# Patient Record
Sex: FEMALE | Race: WHITE | NOT HISPANIC OR LATINO | Employment: FULL TIME | URBAN - METROPOLITAN AREA
[De-identification: names, ages, dates, MRNs, and addresses within clinical notes are randomized per-mention and may not be internally consistent; named-entity substitution may affect disease eponyms.]

---

## 2018-06-25 LAB
EXTERNAL CHLAMYDIA SCREEN: NEGATIVE
EXTERNAL GONORRHEA SCREEN: NEGATIVE
EXTERNAL HEPATITIS B SURFACE ANTIGEN: NEGATIVE
EXTERNAL HEPATITIS C AB: NORMAL
EXTERNAL RUBELLA QUALITATIVE: NORMAL
EXTERNAL SYPHILIS RPR SCREEN: NORMAL
HIV1 P24 AG SERPL QL IA: NORMAL

## 2018-09-17 ENCOUNTER — TRANSCRIBE ORDERS (OUTPATIENT)
Dept: WOMENS IMAGING | Facility: HOSPITAL | Age: 23
End: 2018-09-17

## 2018-09-17 DIAGNOSIS — O28.3 FETAL ECHOGENIC INTRACARDIAC FOCUS ON PRENATAL ULTRASOUND: Primary | ICD-10-CM

## 2018-10-15 ENCOUNTER — OFFICE VISIT (OUTPATIENT)
Dept: OBSTETRICS AND GYNECOLOGY | Facility: HOSPITAL | Age: 23
End: 2018-10-15

## 2018-10-15 ENCOUNTER — HOSPITAL ENCOUNTER (OUTPATIENT)
Dept: WOMENS IMAGING | Facility: HOSPITAL | Age: 23
Discharge: HOME | End: 2018-10-15
Admitting: OBSTETRICS & GYNECOLOGY

## 2018-10-15 VITALS
DIASTOLIC BLOOD PRESSURE: 77 MMHG | HEIGHT: 70 IN | BODY MASS INDEX: 41.95 KG/M2 | WEIGHT: 293 LBS | SYSTOLIC BLOOD PRESSURE: 135 MMHG

## 2018-10-15 DIAGNOSIS — O28.3 FETAL ECHOGENIC INTRACARDIAC FOCUS ON PRENATAL ULTRASOUND: ICD-10-CM

## 2018-10-15 DIAGNOSIS — E66.01 MATERNAL MORBID OBESITY, ANTEPARTUM (HCC): ICD-10-CM

## 2018-10-15 DIAGNOSIS — O35.BXX0 ECHOGENIC FOCUS OF HEART OF FETUS AFFECTING ANTEPARTUM CARE OF MOTHER, SINGLE OR UNSPECIFIED FETUS: Primary | ICD-10-CM

## 2018-10-15 DIAGNOSIS — O99.210 MATERNAL MORBID OBESITY, ANTEPARTUM (HCC): ICD-10-CM

## 2018-10-15 PROCEDURE — 76811 OB US DETAILED SNGL FETUS: CPT | Performed by: OBSTETRICS & GYNECOLOGY

## 2018-10-15 PROCEDURE — 76811 OB US DETAILED SNGL FETUS: CPT

## 2018-10-15 RX ORDER — PRENATAL WITH FERROUS FUM AND FOLIC ACID 3080; 920; 120; 400; 22; 1.84; 3; 20; 10; 1; 12; 200; 27; 25; 2 [IU]/1; [IU]/1; MG/1; [IU]/1; MG/1; MG/1; MG/1; MG/1; MG/1; MG/1; UG/1; MG/1; MG/1; MG/1; MG/1
1 TABLET ORAL DAILY
COMMUNITY

## 2018-10-15 NOTE — PROGRESS NOTES
Documentation of the ultasound findings, images, and interpretations with be available in the patient's Viewpoint report located in the Chart Review Imaging tab in [employer].

## 2018-11-13 LAB — EXTERNAL GTT 1 HOUR: 135

## 2018-11-20 LAB
EXTERNAL GLUCOSE TOLERANCE TEST FASTING: 72 MG/DL
EXTERNAL GTT 3 HOURS: 96

## 2018-12-23 ENCOUNTER — HOSPITAL ENCOUNTER (OUTPATIENT)
Facility: HOSPITAL | Age: 23
Setting detail: OBSERVATION
Discharge: HOME | End: 2018-12-23
Attending: OBSTETRICS & GYNECOLOGY | Admitting: OBSTETRICS & GYNECOLOGY

## 2018-12-23 ENCOUNTER — HOSPITAL ENCOUNTER (OUTPATIENT)
Facility: HOSPITAL | Age: 23
End: 2018-12-23
Attending: OBSTETRICS & GYNECOLOGY | Admitting: OBSTETRICS & GYNECOLOGY

## 2018-12-23 VITALS
TEMPERATURE: 98.2 F | RESPIRATION RATE: 16 BRPM | DIASTOLIC BLOOD PRESSURE: 70 MMHG | HEART RATE: 129 BPM | SYSTOLIC BLOOD PRESSURE: 112 MMHG

## 2018-12-23 PROBLEM — Z34.90 PREGNANCY: Status: ACTIVE | Noted: 2018-12-23

## 2018-12-23 LAB
BACTERIA UR QL AUTO: NORMAL /HPF
BILIRUB UR QL STRIP: NEGATIVE
CLARITY UR: CLEAR
COLOR UR: YELLOW
GLUCOSE UR STRIP-MCNC: NEGATIVE MG/DL
HGB UR QL STRIP.AUTO: NEGATIVE
HYALINE CASTS UR QL AUTO: NORMAL /LPF
KETONES UR QL STRIP: ABNORMAL
LEUKOCYTE ESTERASE UR QL STRIP.AUTO: ABNORMAL
NITRITE UR QL STRIP: NEGATIVE
PH UR STRIP.AUTO: 6.5 [PH] (ref 5–8)
PROT UR QL STRIP: NEGATIVE
RBC # UR: NORMAL /HPF
REF LAB TEST METHOD: NORMAL
SP GR UR STRIP: 1.02 (ref 1–1.03)
SQUAMOUS #/AREA URNS HPF: NORMAL /HPF
UROBILINOGEN UR QL STRIP: ABNORMAL
WBC UR QL AUTO: NORMAL /HPF

## 2018-12-23 PROCEDURE — 99218 PR INITIAL OBSERVATION CARE/DAY 30 MINUTES: CPT | Performed by: OBSTETRICS & GYNECOLOGY

## 2018-12-23 PROCEDURE — G0378 HOSPITAL OBSERVATION PER HR: HCPCS

## 2018-12-23 PROCEDURE — 59025 FETAL NON-STRESS TEST: CPT

## 2018-12-23 PROCEDURE — 81001 URINALYSIS AUTO W/SCOPE: CPT | Performed by: OBSTETRICS & GYNECOLOGY

## 2018-12-23 PROCEDURE — 59025 FETAL NON-STRESS TEST: CPT | Performed by: OBSTETRICS & GYNECOLOGY

## 2018-12-23 NOTE — PROGRESS NOTES
\[facility]  Obstetric History and Physical    Referring Provider: Adwoa Lawrence MD      Chief Complaint   Patient presents with   • Abdominal Pain       Subjective     Patient is a 23 y.o. female  currently at 34w1d, who presents with c/o abdominal pain.  She reports sharp shooting midabdominal discomfort the last 30 seconds several times throughout the day without associated leaking of fluid or vaginal bleeding.  Patient reports normal fetal activity.  Patient denies any rare associated symptoms or concerns.  Prenatal care by Dr. Newberry without complications.        The following portions of the patients history were reviewed and updated as appropriate: current medications, allergies, past medical history, past surgical history, past family history, past social history and problem list .       Prenatal Information:   Maternal Prenatal Labs  Blood Type No results found for: ABO   Rh Status No results found for: RH   Antibody Screen No results found for: ABSCRN   Gonnorhea No results found for: GCCX   Chlamydia No results found for: CLAMYDCU   RPR No results found for: RPR   Syphilis Antibody No results found for: SYPHILIS   Rubella No results found for: RUBELLAIGGIN   Hepatitis B Surface Antigen No results found for: HEPBSAG   HIV-1 Antibody No results found for: LABHIV1   Hepatitis C Antibody No results found for: HEPCAB   Rapid Urin Drug Screen No results found for: AMPMETHU, BARBITSCNUR, LABBENZSCN, LABMETHSCN, LABOPIASCN, THCURSCR, COCAINEUR, AMPHETSCREEN, PROPOXSCN, BUPRENORSCNU, METAMPSCNUR, OXYCODONESCN, TRICYCLICSCN   Group B Strep Culture No results found for: GBSANTIGEN                 Past OB History:       Obstetric History       T0      L0     SAB0   TAB0   Ectopic0   Molar0   Multiple0   Live Births0       # Outcome Date GA Lbr Carl/2nd Weight Sex Delivery Anes PTL Lv   1 Current                   Past Medical History: Past Medical History:   Diagnosis Date   • Morbid obesity  with BMI of 40.0-44.9, adult (CMS/[facility])       Past Surgical History Past Surgical History:   Procedure Laterality Date   • EAR TUBES     • MOUTH SURGERY     • TONSILLECTOMY        Family History: Family History   Problem Relation Age of Onset   • No Known Problems Father    • No Known Problems Mother    • No Known Problems Brother    • No Known Problems Sister    • No Known Problems Son    • No Known Problems Daughter    • No Known Problems Paternal Grandfather    • No Known Problems Maternal Grandmother    • No Known Problems Maternal Grandfather    • No Known Problems Maternal Aunt    • No Known Problems Maternal Uncle    • No Known Problems Paternal Aunt    • No Known Problems Paternal Uncle       Social History:  reports that  has never smoked. she has never used smokeless tobacco.   reports that she does not drink alcohol.   reports that she does not use drugs.                   General ROS Negative Findings:Headaches, Visual Changes, Epigastric pain, Anorexia, Nausia/Vomiting, ROM and Vaginal Bleeding    ROS     All other systems have been reviewed and are neg  Objective       Vital Signs Range for the last 24 hours  Temperature: Temp:  [98.2 °F (36.8 °C)] 98.2 °F (36.8 °C)   Temp Source: Temp src: Oral   BP: BP: (112)/(70) 112/70   Pulse: Heart Rate:  [129] 129   Respirations: Resp:  [16] 16   SPO2:     O2 Amount (l/min):     O2 Devices     Weight:       Physical Examination:   General:   alert, appears stated age and cooperative   Skin:   normal   HEENT:  sclera clear    Lungs:   clear to auscultation bilaterally   Heart:   regular rate and rhythm, S1, S2 normal, no murmur, click, rub or gallop   Abdomen:  soft, gravid uterus non tender    Lower Extremeties No edema, no calf tnderness   Pelvis:  External genitalia: normal general appearance  Uterus: enlarged         Presentation: vtx   Cervix: Exam by: Method: sterile exam per physician   Dilation:  closed   Effacement:  th   Station:  ne       Fetal Heart  Rate Assessment   Method: Fetal HR Assessment Method: external   Beats/min: Fetal HR (beats/min): 150   Baseline: Fetal Heart Baseline Rate: normal range   Varibility: Fetal HR Variability: moderate (amplitude range 6 to 25 bpm)   Accels: Fetal HR Accelerations: greater than/equal to 15 bpm   Decels: Fetal HR Decelerations: absent   Tracing Category:     NST  ind   Abdominal pain   reactive, moderate variability, multiple accelerations present 15 x 15, no decelerations, onset 1403 offset 1453.  Irregular contractions.  Uterine Assessment   Method: Method: external tocotransducer   Frequency (min):     Ctx Count in 10 min:     Duration:     Intensity: Contraction Intensity: mild by palpation   Intensity by IUPC:     Resting Tone: Uterine Resting Tone: soft by palpation   Resting Tone by IUPC:     [place] Units:       Laboratory Results:   Lab Results (last 24 hours)     Procedure Component Value Units Date/Time    Urinalysis, Microscopic Only - Urine, Clean Catch [[id number]] Collected:  12/23/18 1425    Specimen:  Urine, Clean Catch Updated:  12/23/18 1445     RBC, UA 0-2 /HPF      WBC, UA 0-2 /HPF      Bacteria, UA None Seen /HPF      Squamous Epithelial Cells, UA 0-2 /HPF      Hyaline Casts, UA 0-6 /LPF      Methodology Automated Microscopy    Urinalysis With Microscopic If Indicated (No Culture) - Urine, Clean Catch [[id number]]  (Abnormal) Collected:  12/23/18 1425    Specimen:  Urine, Clean Catch Updated:  12/23/18 1443     Color, UA Yellow     Appearance, UA Clear     pH, UA 6.5     Specific Gravity, UA 1.020     Glucose, UA Negative     Ketones, UA 15 mg/dL (1+)     Bilirubin, UA Negative     Blood, UA Negative     Protein, UA Negative     Leuk Esterase, UA Trace     Nitrite, UA Negative     Urobilinogen, UA 1.0 E.U./dL        Radiology Review:   Imaging Results (last 24 hours)     ** No results found for the last 24 hours. **        Other Studies:    Assessment/Plan       Pregnancy        Assessment:  1.   Intrauterine pregnancy at 34w1d weeks gestation with reactive fetal status.    2.  False labor   3.   4.      Plan:  1. D/C to home, PTL instructions  2. Plan of care has been reviewed with patient.  3.  Risks, benefits of treatment plan have been discussed.  4.  All questions have been answered.  5      Silvano Rodriguez,   12/23/2018  3:03 PM

## 2018-12-26 ENCOUNTER — OFFICE VISIT (OUTPATIENT)
Dept: OBSTETRICS AND GYNECOLOGY | Facility: HOSPITAL | Age: 23
End: 2018-12-26

## 2018-12-26 ENCOUNTER — HOSPITAL ENCOUNTER (OUTPATIENT)
Dept: WOMENS IMAGING | Facility: HOSPITAL | Age: 23
Discharge: HOME | End: 2018-12-26
Admitting: OBSTETRICS & GYNECOLOGY

## 2018-12-26 VITALS — WEIGHT: 293 LBS | DIASTOLIC BLOOD PRESSURE: 68 MMHG | SYSTOLIC BLOOD PRESSURE: 116 MMHG | BODY MASS INDEX: 44.62 KG/M2

## 2018-12-26 DIAGNOSIS — O99.210 MATERNAL MORBID OBESITY, ANTEPARTUM (HCC): Primary | ICD-10-CM

## 2018-12-26 DIAGNOSIS — O35.BXX0 ECHOGENIC FOCUS OF HEART OF FETUS AFFECTING ANTEPARTUM CARE OF MOTHER, SINGLE OR UNSPECIFIED FETUS: ICD-10-CM

## 2018-12-26 DIAGNOSIS — E66.01 MATERNAL MORBID OBESITY, ANTEPARTUM (HCC): ICD-10-CM

## 2018-12-26 DIAGNOSIS — O99.210 MATERNAL MORBID OBESITY, ANTEPARTUM (HCC): ICD-10-CM

## 2018-12-26 DIAGNOSIS — E66.01 MATERNAL MORBID OBESITY, ANTEPARTUM (HCC): Primary | ICD-10-CM

## 2018-12-26 PROCEDURE — 76816 OB US FOLLOW-UP PER FETUS: CPT

## 2018-12-26 PROCEDURE — 76816 OB US FOLLOW-UP PER FETUS: CPT | Performed by: OBSTETRICS & GYNECOLOGY

## 2018-12-26 NOTE — PROGRESS NOTES
Documentation of the ultasound findings, images, and interpretations with be available in the patient's Viewpoint report located in the Chart Review Imaging tab in [employer].

## 2018-12-26 NOTE — PROGRESS NOTES
Pt denies lof or vag bleeding. States she has been cramping and went to L&D on Sun. No cervical change and sent home. Pt to see Dr Lawrence today.

## 2019-01-07 ENCOUNTER — LAB (OUTPATIENT)
Dept: LAB | Facility: HOSPITAL | Age: 24
End: 2019-01-07

## 2019-01-07 DIAGNOSIS — Z34.03 ENCOUNTER FOR SUPERVISION OF NORMAL FIRST PREGNANCY IN THIRD TRIMESTER: Primary | ICD-10-CM

## 2019-01-07 PROCEDURE — 87081 CULTURE SCREEN ONLY: CPT

## 2019-01-10 LAB — BACTERIA SPEC AEROBE CULT: NORMAL

## 2019-01-27 ENCOUNTER — HOSPITAL ENCOUNTER (INPATIENT)
Dept: LABOR AND DELIVERY | Facility: HOSPITAL | Age: 24
LOS: 3 days | Discharge: HOME | End: 2019-01-30
Attending: OBSTETRICS & GYNECOLOGY | Admitting: OBSTETRICS & GYNECOLOGY

## 2019-01-27 PROBLEM — Z37.9 NORMAL LABOR: Status: ACTIVE | Noted: 2019-01-27

## 2019-01-27 LAB
ABO GROUP BLD: NORMAL
ALP SERPL-CCNC: 195 U/L (ref 25–100)
ALT SERPL W P-5'-P-CCNC: 31 U/L (ref 7–40)
AMPHET+METHAMPHET UR QL: NEGATIVE
AMPHETAMINES UR QL: NEGATIVE
ANTI-D, PASSIVE: NORMAL
AST SERPL-CCNC: 21 U/L (ref 0–33)
BARBITURATES UR QL SCN: NEGATIVE
BENZODIAZ UR QL SCN: NEGATIVE
BILIRUB SERPL-MCNC: 0.3 MG/DL (ref 0.3–1.2)
BLD GP AB SCN SERPL QL: POSITIVE
BUPRENORPHINE SERPL-MCNC: NEGATIVE NG/ML
CANNABINOIDS SERPL QL: NEGATIVE
COCAINE UR QL: NEGATIVE
CREAT BLD-MCNC: 0.6 MG/DL (ref 0.6–1.3)
DEPRECATED RDW RBC AUTO: 47 FL (ref 37–54)
ERYTHROCYTE [DISTWIDTH] IN BLOOD BY AUTOMATED COUNT: 14.7 % (ref 11.3–14.5)
HCT VFR BLD AUTO: 37.8 % (ref 34.5–44)
HGB BLD-MCNC: 12.5 G/DL (ref 11.5–15.5)
LDH SERPL-CCNC: 203 U/L (ref 120–246)
MCH RBC QN AUTO: 29.1 PG (ref 27–31)
MCHC RBC AUTO-ENTMCNC: 33.1 G/DL (ref 32–36)
MCV RBC AUTO: 87.9 FL (ref 80–99)
METHADONE UR QL SCN: NEGATIVE
OPIATES UR QL: NEGATIVE
OXYCODONE UR QL SCN: NEGATIVE
PCP UR QL SCN: NEGATIVE
PLATELET # BLD AUTO: 221 10*3/MM3 (ref 150–450)
PMV BLD AUTO: 10.4 FL (ref 6–12)
PROPOXYPH UR QL: NEGATIVE
RBC # BLD AUTO: 4.3 10*6/MM3 (ref 3.89–5.14)
RH BLD: NEGATIVE
T&S EXPIRATION DATE: NORMAL
TRICYCLICS UR QL SCN: NEGATIVE
URATE SERPL-MCNC: 4.9 MG/DL (ref 3.1–7.8)
WBC NRBC COR # BLD: 8.91 10*3/MM3 (ref 4.5–13.5)

## 2019-01-27 PROCEDURE — 80306 DRUG TEST PRSMV INSTRMNT: CPT | Performed by: OBSTETRICS & GYNECOLOGY

## 2019-01-27 PROCEDURE — 82247 BILIRUBIN TOTAL: CPT | Performed by: OBSTETRICS & GYNECOLOGY

## 2019-01-27 PROCEDURE — 85027 COMPLETE CBC AUTOMATED: CPT | Performed by: OBSTETRICS & GYNECOLOGY

## 2019-01-27 PROCEDURE — 86901 BLOOD TYPING SEROLOGIC RH(D): CPT | Performed by: OBSTETRICS & GYNECOLOGY

## 2019-01-27 PROCEDURE — 84550 ASSAY OF BLOOD/URIC ACID: CPT | Performed by: OBSTETRICS & GYNECOLOGY

## 2019-01-27 PROCEDURE — 83615 LACTATE (LD) (LDH) ENZYME: CPT | Performed by: OBSTETRICS & GYNECOLOGY

## 2019-01-27 PROCEDURE — 86901 BLOOD TYPING SEROLOGIC RH(D): CPT

## 2019-01-27 PROCEDURE — 86900 BLOOD TYPING SEROLOGIC ABO: CPT

## 2019-01-27 PROCEDURE — 86900 BLOOD TYPING SEROLOGIC ABO: CPT | Performed by: OBSTETRICS & GYNECOLOGY

## 2019-01-27 PROCEDURE — 86850 RBC ANTIBODY SCREEN: CPT | Performed by: OBSTETRICS & GYNECOLOGY

## 2019-01-27 PROCEDURE — 3E033VJ INTRODUCTION OF OTHER HORMONE INTO PERIPHERAL VEIN, PERCUTANEOUS APPROACH: ICD-10-PCS | Performed by: OBSTETRICS & GYNECOLOGY

## 2019-01-27 PROCEDURE — 84075 ASSAY ALKALINE PHOSPHATASE: CPT | Performed by: OBSTETRICS & GYNECOLOGY

## 2019-01-27 PROCEDURE — 82565 ASSAY OF CREATININE: CPT | Performed by: OBSTETRICS & GYNECOLOGY

## 2019-01-27 PROCEDURE — 84460 ALANINE AMINO (ALT) (SGPT): CPT | Performed by: OBSTETRICS & GYNECOLOGY

## 2019-01-27 PROCEDURE — 86870 RBC ANTIBODY IDENTIFICATION: CPT | Performed by: OBSTETRICS & GYNECOLOGY

## 2019-01-27 PROCEDURE — 84450 TRANSFERASE (AST) (SGOT): CPT | Performed by: OBSTETRICS & GYNECOLOGY

## 2019-01-27 RX ORDER — SODIUM CHLORIDE 0.9 % (FLUSH) 0.9 %
1-10 SYRINGE (ML) INJECTION AS NEEDED
Status: DISCONTINUED | OUTPATIENT
Start: 2019-01-27 | End: 2019-01-28 | Stop reason: HOSPADM

## 2019-01-27 RX ORDER — ZOLPIDEM TARTRATE 5 MG/1
5 TABLET ORAL NIGHTLY PRN
Status: DISCONTINUED | OUTPATIENT
Start: 2019-01-27 | End: 2019-01-28 | Stop reason: HOSPADM

## 2019-01-27 RX ORDER — PROMETHAZINE HYDROCHLORIDE 12.5 MG/1
12.5 TABLET ORAL EVERY 6 HOURS PRN
Status: DISCONTINUED | OUTPATIENT
Start: 2019-01-27 | End: 2019-01-28 | Stop reason: HOSPADM

## 2019-01-27 RX ORDER — PROMETHAZINE HYDROCHLORIDE 12.5 MG/1
12.5 SUPPOSITORY RECTAL EVERY 6 HOURS PRN
Status: DISCONTINUED | OUTPATIENT
Start: 2019-01-27 | End: 2019-01-28 | Stop reason: HOSPADM

## 2019-01-27 RX ORDER — LIDOCAINE HYDROCHLORIDE 10 MG/ML
5 INJECTION, SOLUTION EPIDURAL; INFILTRATION; INTRACAUDAL; PERINEURAL AS NEEDED
Status: DISCONTINUED | OUTPATIENT
Start: 2019-01-27 | End: 2019-01-28 | Stop reason: HOSPADM

## 2019-01-27 RX ORDER — PROMETHAZINE HYDROCHLORIDE 25 MG/ML
12.5 INJECTION, SOLUTION INTRAMUSCULAR; INTRAVENOUS EVERY 6 HOURS PRN
Status: DISCONTINUED | OUTPATIENT
Start: 2019-01-27 | End: 2019-01-28 | Stop reason: HOSPADM

## 2019-01-27 RX ORDER — ONDANSETRON 4 MG/1
4 TABLET, FILM COATED ORAL EVERY 6 HOURS PRN
Status: DISCONTINUED | OUTPATIENT
Start: 2019-01-27 | End: 2019-01-28 | Stop reason: HOSPADM

## 2019-01-27 RX ORDER — ONDANSETRON 2 MG/ML
4 INJECTION INTRAMUSCULAR; INTRAVENOUS EVERY 6 HOURS PRN
Status: DISCONTINUED | OUTPATIENT
Start: 2019-01-27 | End: 2019-01-28 | Stop reason: HOSPADM

## 2019-01-27 RX ORDER — BUTORPHANOL TARTRATE 1 MG/ML
2 INJECTION, SOLUTION INTRAMUSCULAR; INTRAVENOUS
Status: DISCONTINUED | OUTPATIENT
Start: 2019-01-27 | End: 2019-01-28 | Stop reason: HOSPADM

## 2019-01-27 RX ORDER — BUTORPHANOL TARTRATE 1 MG/ML
1 INJECTION, SOLUTION INTRAMUSCULAR; INTRAVENOUS
Status: DISCONTINUED | OUTPATIENT
Start: 2019-01-27 | End: 2019-01-28 | Stop reason: HOSPADM

## 2019-01-27 RX ORDER — SODIUM CHLORIDE 0.9 % (FLUSH) 0.9 %
3 SYRINGE (ML) INJECTION EVERY 12 HOURS SCHEDULED
Status: DISCONTINUED | OUTPATIENT
Start: 2019-01-27 | End: 2019-01-28 | Stop reason: HOSPADM

## 2019-01-27 RX ORDER — SODIUM CHLORIDE, SODIUM LACTATE, POTASSIUM CHLORIDE, CALCIUM CHLORIDE 600; 310; 30; 20 MG/100ML; MG/100ML; MG/100ML; MG/100ML
125 INJECTION, SOLUTION INTRAVENOUS CONTINUOUS
Status: DISCONTINUED | OUTPATIENT
Start: 2019-01-27 | End: 2019-01-30

## 2019-01-27 RX ORDER — OXYTOCIN-SODIUM CHLORIDE 0.9% IV SOLN 30 UNIT/500ML 30-0.9/5 UT/ML-%
2-30 SOLUTION INTRAVENOUS
Status: DISCONTINUED | OUTPATIENT
Start: 2019-01-28 | End: 2019-01-30

## 2019-01-27 RX ORDER — MAGNESIUM CARB/ALUMINUM HYDROX 105-160MG
30 TABLET,CHEWABLE ORAL ONCE
Status: DISCONTINUED | OUTPATIENT
Start: 2019-01-27 | End: 2019-01-28 | Stop reason: HOSPADM

## 2019-01-27 RX ADMIN — DINOPROSTONE 10 MG: 10 INSERT VAGINAL at 18:45

## 2019-01-27 RX ADMIN — SODIUM CHLORIDE, POTASSIUM CHLORIDE, SODIUM LACTATE AND CALCIUM CHLORIDE 125 ML/HR: 600; 310; 30; 20 INJECTION, SOLUTION INTRAVENOUS at 18:20

## 2019-01-28 ENCOUNTER — ANESTHESIA (OUTPATIENT)
Dept: LABOR AND DELIVERY | Facility: HOSPITAL | Age: 24
End: 2019-01-28

## 2019-01-28 ENCOUNTER — ANESTHESIA EVENT (OUTPATIENT)
Dept: LABOR AND DELIVERY | Facility: HOSPITAL | Age: 24
End: 2019-01-28

## 2019-01-28 PROBLEM — Z34.90 PREGNANCY: Status: RESOLVED | Noted: 2018-12-23 | Resolved: 2019-01-28

## 2019-01-28 LAB
ABO GROUP BLD: NORMAL
ATMOSPHERIC PRESS: ABNORMAL MMHG
ATMOSPHERIC PRESS: ABNORMAL MMHG
BASE EXCESS BLDCOA CALC-SCNC: -5.5 MMOL/L (ref 0–2)
BASE EXCESS BLDCOV CALC-SCNC: -5.2 MMOL/L (ref 0–2)
BDY SITE: ABNORMAL
BDY SITE: ABNORMAL
BODY TEMPERATURE: 37 C
BODY TEMPERATURE: 37 C
CO2 BLDA-SCNC: 20.1 MMOL/L (ref 23–27)
CO2 BLDA-SCNC: 25.1 MMOL/L (ref 23–27)
FETAL BLEED: NEGATIVE
HCO3 BLDCOA-SCNC: 23.4 MMOL/L (ref 16.9–20.5)
HCO3 BLDCOV-SCNC: 19 MMOL/L (ref 18.6–21.4)
HGB BLDA-MCNC: 16.4 G/DL (ref 14–18)
HGB BLDA-MCNC: 17 G/DL (ref 14–18)
HOROWITZ INDEX BLD+IHG-RTO: 21 %
HOROWITZ INDEX BLD+IHG-RTO: 21 %
MODALITY: ABNORMAL
MODALITY: ABNORMAL
NOTE: ABNORMAL
NOTE: ABNORMAL
NUMBER OF DOSES: NORMAL
PCO2 BLDCOA: 57.4 MMHG (ref 43.3–54.9)
PCO2 BLDCOV: 33.1 MM HG
PH BLDCOA: 7.22 PH UNITS (ref 7.22–7.3)
PH BLDCOV: 7.37 PH UNITS
PO2 BLDCOA: 17.8 MMHG (ref 11.5–43.3)
PO2 BLDCOV: 35.3 MM HG
RH BLD: NEGATIVE
SAO2 % BLDCOA: 24.3 %
SAO2 % BLDCOA: ABNORMAL % (ref 92–98)
SAO2 % BLDCOV: 74.5 %

## 2019-01-28 PROCEDURE — 86900 BLOOD TYPING SEROLOGIC ABO: CPT | Performed by: OBSTETRICS & GYNECOLOGY

## 2019-01-28 PROCEDURE — 3E0P7VZ INTRODUCTION OF HORMONE INTO FEMALE REPRODUCTIVE, VIA NATURAL OR ARTIFICIAL OPENING: ICD-10-PCS | Performed by: OBSTETRICS & GYNECOLOGY

## 2019-01-28 PROCEDURE — C1755 CATHETER, INTRASPINAL: HCPCS | Performed by: ANESTHESIOLOGY

## 2019-01-28 PROCEDURE — 85461 HEMOGLOBIN FETAL: CPT | Performed by: OBSTETRICS & GYNECOLOGY

## 2019-01-28 PROCEDURE — 25010000002 BUTORPHANOL PER 1 MG: Performed by: OBSTETRICS & GYNECOLOGY

## 2019-01-28 PROCEDURE — 25010000002 FENTANYL CITRATE (PF) 100 MCG/2ML SOLUTION: Performed by: ANESTHESIOLOGY

## 2019-01-28 PROCEDURE — 0KQM0ZZ REPAIR PERINEUM MUSCLE, OPEN APPROACH: ICD-10-PCS | Performed by: OBSTETRICS & GYNECOLOGY

## 2019-01-28 PROCEDURE — 25010000002 CEFTRIAXONE PER 250 MG: Performed by: OBSTETRICS & GYNECOLOGY

## 2019-01-28 PROCEDURE — 51702 INSERT TEMP BLADDER CATH: CPT

## 2019-01-28 PROCEDURE — 25010000002 ROPIVACAINE PER 1 MG: Performed by: ANESTHESIOLOGY

## 2019-01-28 PROCEDURE — 25010000002 RHO D IMMUNE GLOBULIN 1500 UNIT/2ML SOLUTION PREFILLED SYRINGE: Performed by: OBSTETRICS & GYNECOLOGY

## 2019-01-28 PROCEDURE — 82805 BLOOD GASES W/O2 SATURATION: CPT

## 2019-01-28 PROCEDURE — C1755 CATHETER, INTRASPINAL: HCPCS

## 2019-01-28 PROCEDURE — 86901 BLOOD TYPING SEROLOGIC RH(D): CPT | Performed by: OBSTETRICS & GYNECOLOGY

## 2019-01-28 PROCEDURE — 59025 FETAL NON-STRESS TEST: CPT

## 2019-01-28 RX ORDER — OXYCODONE HYDROCHLORIDE AND ACETAMINOPHEN 5; 325 MG/1; MG/1
1 TABLET ORAL EVERY 4 HOURS PRN
Status: DISCONTINUED | OUTPATIENT
Start: 2019-01-28 | End: 2019-01-30 | Stop reason: HOSPADM

## 2019-01-28 RX ORDER — HYDROCODONE BITARTRATE AND ACETAMINOPHEN 5; 325 MG/1; MG/1
1 TABLET ORAL EVERY 4 HOURS PRN
Status: DISCONTINUED | OUTPATIENT
Start: 2019-01-28 | End: 2019-01-28 | Stop reason: HOSPADM

## 2019-01-28 RX ORDER — OXYTOCIN-SODIUM CHLORIDE 0.9% IV SOLN 30 UNIT/500ML 30-0.9/5 UT/ML-%
85 SOLUTION INTRAVENOUS ONCE
Status: COMPLETED | OUTPATIENT
Start: 2019-01-28 | End: 2019-01-28

## 2019-01-28 RX ORDER — HYDROCODONE BITARTRATE AND ACETAMINOPHEN 5; 325 MG/1; MG/1
1 TABLET ORAL EVERY 4 HOURS PRN
Status: DISCONTINUED | OUTPATIENT
Start: 2019-01-28 | End: 2019-01-30 | Stop reason: HOSPADM

## 2019-01-28 RX ORDER — FENTANYL CITRATE 50 UG/ML
INJECTION, SOLUTION INTRAMUSCULAR; INTRAVENOUS AS NEEDED
Status: DISCONTINUED | OUTPATIENT
Start: 2019-01-28 | End: 2019-01-28 | Stop reason: SURG

## 2019-01-28 RX ORDER — CEFTRIAXONE SODIUM 1 G/50ML
1 INJECTION, SOLUTION INTRAVENOUS ONCE
Status: COMPLETED | OUTPATIENT
Start: 2019-01-28 | End: 2019-01-28

## 2019-01-28 RX ORDER — OXYTOCIN-SODIUM CHLORIDE 0.9% IV SOLN 30 UNIT/500ML 30-0.9/5 UT/ML-%
650 SOLUTION INTRAVENOUS ONCE
Status: COMPLETED | OUTPATIENT
Start: 2019-01-28 | End: 2019-01-28

## 2019-01-28 RX ORDER — METHYLERGONOVINE MALEATE 0.2 MG/ML
200 INJECTION INTRAVENOUS ONCE AS NEEDED
Status: DISCONTINUED | OUTPATIENT
Start: 2019-01-28 | End: 2019-01-28 | Stop reason: HOSPADM

## 2019-01-28 RX ORDER — EPHEDRINE SULFATE/0.9% NACL/PF 25 MG/5 ML
10 SYRINGE (ML) INTRAVENOUS
Status: DISCONTINUED | OUTPATIENT
Start: 2019-01-28 | End: 2019-01-28 | Stop reason: HOSPADM

## 2019-01-28 RX ORDER — SODIUM CHLORIDE 0.9 % (FLUSH) 0.9 %
1-10 SYRINGE (ML) INJECTION AS NEEDED
Status: DISCONTINUED | OUTPATIENT
Start: 2019-01-28 | End: 2019-01-30 | Stop reason: HOSPADM

## 2019-01-28 RX ORDER — LIDOCAINE HYDROCHLORIDE AND EPINEPHRINE 15; 5 MG/ML; UG/ML
INJECTION, SOLUTION EPIDURAL AS NEEDED
Status: DISCONTINUED | OUTPATIENT
Start: 2019-01-28 | End: 2019-01-28 | Stop reason: SURG

## 2019-01-28 RX ORDER — ROPIVACAINE HYDROCHLORIDE 2 MG/ML
16 INJECTION, SOLUTION EPIDURAL; INFILTRATION; PERINEURAL CONTINUOUS
Status: DISCONTINUED | OUTPATIENT
Start: 2019-01-28 | End: 2019-01-30

## 2019-01-28 RX ORDER — BISACODYL 10 MG
10 SUPPOSITORY, RECTAL RECTAL DAILY PRN
Status: DISCONTINUED | OUTPATIENT
Start: 2019-01-29 | End: 2019-01-30 | Stop reason: HOSPADM

## 2019-01-28 RX ORDER — ZOLPIDEM TARTRATE 5 MG/1
5 TABLET ORAL NIGHTLY PRN
Status: DISCONTINUED | OUTPATIENT
Start: 2019-01-28 | End: 2019-01-30 | Stop reason: HOSPADM

## 2019-01-28 RX ORDER — TRISODIUM CITRATE DIHYDRATE AND CITRIC ACID MONOHYDRATE 500; 334 MG/5ML; MG/5ML
30 SOLUTION ORAL ONCE
Status: DISCONTINUED | OUTPATIENT
Start: 2019-01-28 | End: 2019-01-28 | Stop reason: HOSPADM

## 2019-01-28 RX ORDER — PRENATAL VIT/IRON FUM/FOLIC AC 27MG-0.8MG
1 TABLET ORAL DAILY
Status: DISCONTINUED | OUTPATIENT
Start: 2019-01-28 | End: 2019-01-30 | Stop reason: HOSPADM

## 2019-01-28 RX ORDER — LIDOCAINE HYDROCHLORIDE 20 MG/ML
INJECTION, SOLUTION INFILTRATION; PERINEURAL AS NEEDED
Status: DISCONTINUED | OUTPATIENT
Start: 2019-01-28 | End: 2019-01-28 | Stop reason: SURG

## 2019-01-28 RX ORDER — ONDANSETRON 2 MG/ML
4 INJECTION INTRAMUSCULAR; INTRAVENOUS ONCE AS NEEDED
Status: DISCONTINUED | OUTPATIENT
Start: 2019-01-28 | End: 2019-01-28 | Stop reason: HOSPADM

## 2019-01-28 RX ORDER — ONDANSETRON 2 MG/ML
4 INJECTION INTRAMUSCULAR; INTRAVENOUS EVERY 6 HOURS PRN
Status: DISCONTINUED | OUTPATIENT
Start: 2019-01-28 | End: 2019-01-30 | Stop reason: HOSPADM

## 2019-01-28 RX ORDER — METHYLERGONOVINE MALEATE 0.2 MG/ML
200 INJECTION INTRAVENOUS ONCE AS NEEDED
Status: DISCONTINUED | OUTPATIENT
Start: 2019-01-28 | End: 2019-01-30 | Stop reason: HOSPADM

## 2019-01-28 RX ORDER — IBUPROFEN 600 MG/1
600 TABLET ORAL EVERY 6 HOURS PRN
Status: DISCONTINUED | OUTPATIENT
Start: 2019-01-28 | End: 2019-01-30 | Stop reason: HOSPADM

## 2019-01-28 RX ORDER — ONDANSETRON 4 MG/1
4 TABLET, FILM COATED ORAL EVERY 6 HOURS PRN
Status: DISCONTINUED | OUTPATIENT
Start: 2019-01-28 | End: 2019-01-30 | Stop reason: HOSPADM

## 2019-01-28 RX ORDER — DIPHENHYDRAMINE HYDROCHLORIDE 50 MG/ML
12.5 INJECTION INTRAMUSCULAR; INTRAVENOUS EVERY 8 HOURS PRN
Status: DISCONTINUED | OUTPATIENT
Start: 2019-01-28 | End: 2019-01-28 | Stop reason: HOSPADM

## 2019-01-28 RX ORDER — LIDOCAINE HYDROCHLORIDE AND EPINEPHRINE 20; 5 MG/ML; UG/ML
INJECTION, SOLUTION EPIDURAL; INFILTRATION; INTRACAUDAL; PERINEURAL AS NEEDED
Status: DISCONTINUED | OUTPATIENT
Start: 2019-01-28 | End: 2019-01-28 | Stop reason: SURG

## 2019-01-28 RX ORDER — DOCUSATE SODIUM 100 MG/1
100 CAPSULE, LIQUID FILLED ORAL 2 TIMES DAILY
Status: DISCONTINUED | OUTPATIENT
Start: 2019-01-28 | End: 2019-01-30 | Stop reason: HOSPADM

## 2019-01-28 RX ORDER — METOCLOPRAMIDE HYDROCHLORIDE 5 MG/ML
10 INJECTION INTRAMUSCULAR; INTRAVENOUS ONCE AS NEEDED
Status: DISCONTINUED | OUTPATIENT
Start: 2019-01-28 | End: 2019-01-28 | Stop reason: HOSPADM

## 2019-01-28 RX ORDER — MISOPROSTOL 200 UG/1
800 TABLET ORAL AS NEEDED
Status: DISCONTINUED | OUTPATIENT
Start: 2019-01-28 | End: 2019-01-28 | Stop reason: HOSPADM

## 2019-01-28 RX ORDER — MAGNESIUM CARB/ALUMINUM HYDROX 105-160MG
30 TABLET,CHEWABLE ORAL ONCE
Status: DISCONTINUED | OUTPATIENT
Start: 2019-01-28 | End: 2019-01-28 | Stop reason: HOSPADM

## 2019-01-28 RX ORDER — LANOLIN 100 %
OINTMENT (GRAM) TOPICAL
Status: DISCONTINUED | OUTPATIENT
Start: 2019-01-28 | End: 2019-01-30 | Stop reason: HOSPADM

## 2019-01-28 RX ORDER — PROMETHAZINE HYDROCHLORIDE 12.5 MG/1
12.5 TABLET ORAL EVERY 4 HOURS PRN
Status: DISCONTINUED | OUTPATIENT
Start: 2019-01-28 | End: 2019-01-30 | Stop reason: HOSPADM

## 2019-01-28 RX ORDER — CARBOPROST TROMETHAMINE 250 UG/ML
250 INJECTION, SOLUTION INTRAMUSCULAR AS NEEDED
Status: DISCONTINUED | OUTPATIENT
Start: 2019-01-28 | End: 2019-01-28 | Stop reason: HOSPADM

## 2019-01-28 RX ORDER — IBUPROFEN 600 MG/1
600 TABLET ORAL EVERY 6 HOURS PRN
Status: DISCONTINUED | OUTPATIENT
Start: 2019-01-28 | End: 2019-01-28 | Stop reason: HOSPADM

## 2019-01-28 RX ORDER — PROMETHAZINE HYDROCHLORIDE 25 MG/ML
12.5 INJECTION, SOLUTION INTRAMUSCULAR; INTRAVENOUS EVERY 4 HOURS PRN
Status: DISCONTINUED | OUTPATIENT
Start: 2019-01-28 | End: 2019-01-30 | Stop reason: HOSPADM

## 2019-01-28 RX ADMIN — BENZOCAINE 1 APPLICATION: 5.6 OINTMENT TOPICAL at 18:44

## 2019-01-28 RX ADMIN — IBUPROFEN 600 MG: 600 TABLET ORAL at 16:15

## 2019-01-28 RX ADMIN — LIDOCAINE HYDROCHLORIDE,EPINEPHRINE BITARTRATE 5 ML: 20; .005 INJECTION, SOLUTION EPIDURAL; INFILTRATION; INTRACAUDAL; PERINEURAL at 13:07

## 2019-01-28 RX ADMIN — ROPIVACAINE HYDROCHLORIDE 10 ML: 5 INJECTION, SOLUTION EPIDURAL; INFILTRATION; PERINEURAL at 11:08

## 2019-01-28 RX ADMIN — OXYTOCIN 2 MILLI-UNITS/MIN: 10 INJECTION, SOLUTION INTRAMUSCULAR; INTRAVENOUS at 01:40

## 2019-01-28 RX ADMIN — Medication 10 MG: at 06:01

## 2019-01-28 RX ADMIN — LIDOCAINE HYDROCHLORIDE AND EPINEPHRINE 3 ML: 15; 5 INJECTION, SOLUTION EPIDURAL at 05:43

## 2019-01-28 RX ADMIN — HYDROCODONE BITARTRATE AND ACETAMINOPHEN 1 TABLET: 5; 325 TABLET ORAL at 17:03

## 2019-01-28 RX ADMIN — CEFTRIAXONE SODIUM 1 G: 1 INJECTION, SOLUTION INTRAVENOUS at 20:30

## 2019-01-28 RX ADMIN — ROPIVACAINE HYDROCHLORIDE 10 ML: 5 INJECTION, SOLUTION EPIDURAL; INFILTRATION; PERINEURAL at 05:48

## 2019-01-28 RX ADMIN — FENTANYL CITRATE 100 MCG: 50 INJECTION, SOLUTION INTRAMUSCULAR; INTRAVENOUS at 05:48

## 2019-01-28 RX ADMIN — WITCH HAZEL 1 PAD: 500 SOLUTION RECTAL; TOPICAL at 18:44

## 2019-01-28 RX ADMIN — OXYCODONE HYDROCHLORIDE AND ACETAMINOPHEN 1 TABLET: 5; 325 TABLET ORAL at 21:50

## 2019-01-28 RX ADMIN — Medication: at 18:44

## 2019-01-28 RX ADMIN — OXYTOCIN 650 ML/HR: 10 INJECTION, SOLUTION INTRAMUSCULAR; INTRAVENOUS at 15:41

## 2019-01-28 RX ADMIN — OXYTOCIN 85 ML/HR: 10 INJECTION, SOLUTION INTRAMUSCULAR; INTRAVENOUS at 16:00

## 2019-01-28 RX ADMIN — BUTORPHANOL TARTRATE 2 MG: 1 INJECTION, SOLUTION INTRAMUSCULAR; INTRAVENOUS at 02:55

## 2019-01-28 RX ADMIN — LIDOCAINE HYDROCHLORIDE 5 ML: 20 INJECTION, SOLUTION INFILTRATION; PERINEURAL at 05:40

## 2019-01-28 RX ADMIN — SODIUM CHLORIDE, POTASSIUM CHLORIDE, SODIUM LACTATE AND CALCIUM CHLORIDE 125 ML/HR: 600; 310; 30; 20 INJECTION, SOLUTION INTRAVENOUS at 00:30

## 2019-01-28 RX ADMIN — HUMAN RHO(D) IMMUNE GLOBULIN 1500 UNITS: 1500 SOLUTION INTRAMUSCULAR; INTRAVENOUS at 23:37

## 2019-01-28 RX ADMIN — SODIUM CHLORIDE, POTASSIUM CHLORIDE, SODIUM LACTATE AND CALCIUM CHLORIDE 125 ML/HR: 600; 310; 30; 20 INJECTION, SOLUTION INTRAVENOUS at 13:24

## 2019-01-28 RX ADMIN — SODIUM CHLORIDE, POTASSIUM CHLORIDE, SODIUM LACTATE AND CALCIUM CHLORIDE 1000 ML: 600; 310; 30; 20 INJECTION, SOLUTION INTRAVENOUS at 05:12

## 2019-01-28 RX ADMIN — ROPIVACAINE HYDROCHLORIDE 16 ML/HR: 2 INJECTION, SOLUTION EPIDURAL; INFILTRATION at 05:53

## 2019-01-28 NOTE — ANESTHESIA PROCEDURE NOTES
Labor Epidural      Patient reassessed immediately prior to procedure    Patient location during procedure: OB  Performed By  Anesthesiologist: Maciej Guzmán DO  Preanesthetic Checklist  Completed: patient identified, site marked, surgical consent, pre-op evaluation, timeout performed, IV checked, risks and benefits discussed and monitors and equipment checked  Prep:  Pt Position:sitting  Sterile Tech:gloves, mask, sterile barrier and cap  Prep:chlorhexidine gluconate and isopropyl alcohol  Monitoring:blood pressure monitoring and continuous pulse oximetry  Epidural Block Procedure:  Approach:midline  Guidance:landmark technique and palpation technique  Location:L3-L4  Needle Type:Tuohy  Needle Gauge:17 G  Loss of Resistance Medium: air  Loss of Resistance: 8cm  Cath Depth at skin:13 cm  Paresthesia: none  Aspiration:negative  Test Dose:negative  Number of Attempts: 1  Post Assessment:  Dressing:secured with tape and occlusive dressing applied (Tegaderm Placed)  Pt Tolerance:patient tolerated the procedure well with no apparent complications  Complications:no

## 2019-01-28 NOTE — ANESTHESIA PREPROCEDURE EVALUATION
Anesthesia Evaluation     Patient summary reviewed and Nursing notes reviewed   NPO Solid Status: > 6 hours  NPO Liquid Status: < 2 hours           Airway   Mallampati: III  TM distance: >3 FB  Neck ROM: full  No difficulty expected  Dental      Pulmonary - negative pulmonary ROS   Cardiovascular - negative cardio ROS        Neuro/Psych- negative ROS  GI/Hepatic/Renal/Endo    (+) morbid obesity,      Musculoskeletal (-) negative ROS    Abdominal    Substance History - negative use     OB/GYN    (+) Pregnant,         Other                        Anesthesia Plan    ASA 3     epidural     Anesthetic plan, all risks, benefits, and alternatives have been provided, discussed and informed consent has been obtained with: patient.  Use of blood products discussed with patient .

## 2019-01-29 LAB
BASOPHILS # BLD AUTO: 0.01 10*3/MM3 (ref 0–0.2)
BASOPHILS NFR BLD AUTO: 0.1 % (ref 0–1)
DEPRECATED RDW RBC AUTO: 46.7 FL (ref 37–54)
EOSINOPHIL # BLD AUTO: 0.02 10*3/MM3 (ref 0–0.3)
EOSINOPHIL NFR BLD AUTO: 0.2 % (ref 0–3)
ERYTHROCYTE [DISTWIDTH] IN BLOOD BY AUTOMATED COUNT: 14.6 % (ref 11.3–14.5)
HCT VFR BLD AUTO: 30.2 % (ref 34.5–44)
HGB BLD-MCNC: 10.1 G/DL (ref 11.5–15.5)
IMM GRANULOCYTES # BLD AUTO: 0.03 10*3/MM3 (ref 0–0.03)
IMM GRANULOCYTES NFR BLD AUTO: 0.3 % (ref 0–0.6)
LYMPHOCYTES # BLD AUTO: 0.97 10*3/MM3 (ref 0.6–4.8)
LYMPHOCYTES NFR BLD AUTO: 10.5 % (ref 24–44)
MCH RBC QN AUTO: 29.2 PG (ref 27–31)
MCHC RBC AUTO-ENTMCNC: 33.4 G/DL (ref 32–36)
MCV RBC AUTO: 87.3 FL (ref 80–99)
MONOCYTES # BLD AUTO: 0.84 10*3/MM3 (ref 0–1)
MONOCYTES NFR BLD AUTO: 9.1 % (ref 0–12)
NEUTROPHILS # BLD AUTO: 7.39 10*3/MM3 (ref 1.5–8.3)
NEUTROPHILS NFR BLD AUTO: 79.8 % (ref 41–71)
PLATELET # BLD AUTO: 139 10*3/MM3 (ref 150–450)
PMV BLD AUTO: 10.2 FL (ref 6–12)
RBC # BLD AUTO: 3.46 10*6/MM3 (ref 3.89–5.14)
WBC NRBC COR # BLD: 9.26 10*3/MM3 (ref 4.5–13.5)

## 2019-01-29 PROCEDURE — 85025 COMPLETE CBC W/AUTO DIFF WBC: CPT | Performed by: OBSTETRICS & GYNECOLOGY

## 2019-01-29 RX ADMIN — DOCUSATE SODIUM 100 MG: 100 CAPSULE, LIQUID FILLED ORAL at 21:02

## 2019-01-29 RX ADMIN — IBUPROFEN 600 MG: 600 TABLET ORAL at 19:48

## 2019-01-29 RX ADMIN — DOCUSATE SODIUM 100 MG: 100 CAPSULE, LIQUID FILLED ORAL at 13:09

## 2019-01-29 RX ADMIN — OXYCODONE HYDROCHLORIDE AND ACETAMINOPHEN 1 TABLET: 5; 325 TABLET ORAL at 03:47

## 2019-01-29 RX ADMIN — IBUPROFEN 600 MG: 600 TABLET ORAL at 13:09

## 2019-01-29 RX ADMIN — IBUPROFEN 600 MG: 600 TABLET ORAL at 03:47

## 2019-01-29 NOTE — ANESTHESIA POSTPROCEDURE EVALUATION
Patient: Sabra Carias    Procedure Summary     Date:  01/28/19 Room / Location:      Anesthesia Start:  0527 Anesthesia Stop:  1545    Procedure:  LABOR ANALGESIA Diagnosis:      Scheduled Providers:   Provider:  Wen Seth DO    Anesthesia Type:  epidural ASA Status:  3          Anesthesia Type: epidural  Last vitals  BP   94/51 (01/29/19 0700)   Temp   98.5 °F (36.9 °C) (01/29/19 0700)   Pulse   88 (01/29/19 0700)   Resp   20 (01/29/19 0700)     SpO2   97 % (01/28/19 0553)     Post Anesthesia Care and Evaluation    Patient location during evaluation: bedside  Patient participation: complete - patient participated  Level of consciousness: awake and alert  Pain management: adequate  Airway patency: patent  Anesthetic complications: No anesthetic complications    Cardiovascular status: acceptable  Respiratory status: acceptable  Hydration status: acceptable  Post Neuraxial Block status: Motor and sensory function returned to baseline and No signs or symptoms of PDPH

## 2019-01-30 VITALS
RESPIRATION RATE: 18 BRPM | SYSTOLIC BLOOD PRESSURE: 116 MMHG | BODY MASS INDEX: 41.95 KG/M2 | DIASTOLIC BLOOD PRESSURE: 58 MMHG | HEIGHT: 70 IN | WEIGHT: 293 LBS | TEMPERATURE: 98.3 F | OXYGEN SATURATION: 97 % | HEART RATE: 78 BPM

## 2019-01-30 RX ORDER — IBUPROFEN 600 MG/1
600 TABLET ORAL EVERY 6 HOURS PRN
Qty: 30 TABLET | Refills: 0 | Status: SHIPPED | OUTPATIENT
Start: 2019-01-30 | End: 2021-06-29

## 2019-01-30 RX ADMIN — PRENATAL VIT W/ FE FUMARATE-FA TAB 27-0.8 MG 1 TABLET: 27-0.8 TAB at 08:50

## 2019-01-30 RX ADMIN — IBUPROFEN 600 MG: 600 TABLET ORAL at 08:48

## 2019-01-30 RX ADMIN — IBUPROFEN 600 MG: 600 TABLET ORAL at 02:01

## 2019-01-30 RX ADMIN — DOCUSATE SODIUM 100 MG: 100 CAPSULE, LIQUID FILLED ORAL at 08:48

## 2019-01-30 RX ADMIN — OXYCODONE HYDROCHLORIDE AND ACETAMINOPHEN 1 TABLET: 5; 325 TABLET ORAL at 08:48

## 2020-03-25 ENCOUNTER — TRANSCRIBE ORDERS (OUTPATIENT)
Dept: NUTRITION | Facility: HOSPITAL | Age: 25
End: 2020-03-25

## 2020-03-25 DIAGNOSIS — E66.9 OBESITY, UNSPECIFIED CLASSIFICATION, UNSPECIFIED OBESITY TYPE, UNSPECIFIED WHETHER SERIOUS COMORBIDITY PRESENT: Primary | ICD-10-CM

## 2020-04-06 ENCOUNTER — HOSPITAL ENCOUNTER (OUTPATIENT)
Dept: NUTRITION | Facility: HOSPITAL | Age: 25
Setting detail: RECURRING SERIES
Discharge: HOME | End: 2020-04-06

## 2020-04-06 VITALS — BODY MASS INDEX: 41.95 KG/M2 | HEIGHT: 70 IN | WEIGHT: 293 LBS

## 2020-04-06 PROCEDURE — 97802 MEDICAL NUTRITION INDIV IN: CPT

## 2020-05-06 ENCOUNTER — APPOINTMENT (OUTPATIENT)
Dept: NUTRITION | Facility: HOSPITAL | Age: 25
End: 2020-05-06

## 2020-06-29 ENCOUNTER — LAB REQUISITION (OUTPATIENT)
Dept: LAB | Facility: HOSPITAL | Age: 25
End: 2020-06-29

## 2020-06-29 DIAGNOSIS — Z00.00 ROUTINE GENERAL MEDICAL EXAMINATION AT A HEALTH CARE FACILITY: ICD-10-CM

## 2020-06-29 LAB — HCG INTACT+B SERPL-ACNC: 155.6 MIU/ML

## 2020-06-29 PROCEDURE — 84702 CHORIONIC GONADOTROPIN TEST: CPT | Performed by: NURSE PRACTITIONER

## 2020-07-02 ENCOUNTER — LAB REQUISITION (OUTPATIENT)
Dept: LAB | Facility: HOSPITAL | Age: 25
End: 2020-07-02

## 2020-07-02 DIAGNOSIS — Z00.00 ROUTINE GENERAL MEDICAL EXAMINATION AT A HEALTH CARE FACILITY: ICD-10-CM

## 2020-07-02 LAB — HCG INTACT+B SERPL-ACNC: 198.8 MIU/ML

## 2020-07-02 PROCEDURE — 84702 CHORIONIC GONADOTROPIN TEST: CPT | Performed by: OBSTETRICS & GYNECOLOGY

## 2020-07-08 ENCOUNTER — LAB REQUISITION (OUTPATIENT)
Dept: LAB | Facility: HOSPITAL | Age: 25
End: 2020-07-08

## 2020-07-08 ENCOUNTER — PREP FOR SURGERY (OUTPATIENT)
Dept: OTHER | Facility: HOSPITAL | Age: 25
End: 2020-07-08

## 2020-07-08 DIAGNOSIS — Z00.00 ROUTINE GENERAL MEDICAL EXAMINATION AT A HEALTH CARE FACILITY: ICD-10-CM

## 2020-07-08 LAB
ALBUMIN SERPL-MCNC: 4.2 G/DL (ref 3.5–5.2)
ALBUMIN/GLOB SERPL: 1.7 G/DL
ALP SERPL-CCNC: 75 U/L (ref 39–117)
ALT SERPL W P-5'-P-CCNC: 26 U/L (ref 1–33)
ANION GAP SERPL CALCULATED.3IONS-SCNC: 12 MMOL/L (ref 5–15)
AST SERPL-CCNC: 22 U/L (ref 1–32)
BILIRUB SERPL-MCNC: 0.3 MG/DL (ref 0–1.2)
BUN SERPL-MCNC: 12 MG/DL (ref 6–20)
BUN/CREAT SERPL: 14 (ref 7–25)
CALCIUM SPEC-SCNC: 9.1 MG/DL (ref 8.6–10.5)
CHLORIDE SERPL-SCNC: 102 MMOL/L (ref 98–107)
CO2 SERPL-SCNC: 25 MMOL/L (ref 22–29)
CREAT SERPL-MCNC: 0.86 MG/DL (ref 0.57–1)
GFR SERPL CREATININE-BSD FRML MDRD: 83 ML/MIN/1.73
GLOBULIN UR ELPH-MCNC: 2.5 GM/DL
GLUCOSE SERPL-MCNC: 72 MG/DL (ref 65–99)
HCG INTACT+B SERPL-ACNC: 259.5 MIU/ML
POTASSIUM SERPL-SCNC: 3.9 MMOL/L (ref 3.5–5.2)
PROT SERPL-MCNC: 6.7 G/DL (ref 6–8.5)
SODIUM SERPL-SCNC: 139 MMOL/L (ref 136–145)

## 2020-07-08 PROCEDURE — 84702 CHORIONIC GONADOTROPIN TEST: CPT | Performed by: OBSTETRICS & GYNECOLOGY

## 2020-07-08 PROCEDURE — 80053 COMPREHEN METABOLIC PANEL: CPT | Performed by: OBSTETRICS & GYNECOLOGY

## 2020-07-11 ENCOUNTER — TRANSCRIBE ORDERS (OUTPATIENT)
Dept: LAB | Facility: HOSPITAL | Age: 25
End: 2020-07-11

## 2020-07-11 ENCOUNTER — LAB (OUTPATIENT)
Dept: LAB | Facility: HOSPITAL | Age: 25
End: 2020-07-11

## 2020-07-11 DIAGNOSIS — O00.90 ECTOPIC PREGNANCY, UNSPECIFIED LOCATION, UNSPECIFIED WHETHER INTRAUTERINE PREGNANCY PRESENT: Primary | ICD-10-CM

## 2020-07-11 LAB — HCG INTACT+B SERPL-ACNC: 166.6 MIU/ML

## 2020-07-11 PROCEDURE — 84702 CHORIONIC GONADOTROPIN TEST: CPT | Performed by: OBSTETRICS & GYNECOLOGY

## 2020-07-11 PROCEDURE — 36415 COLL VENOUS BLD VENIPUNCTURE: CPT | Performed by: OBSTETRICS & GYNECOLOGY

## 2020-07-14 ENCOUNTER — LAB REQUISITION (OUTPATIENT)
Dept: LAB | Facility: HOSPITAL | Age: 25
End: 2020-07-14

## 2020-07-14 DIAGNOSIS — Z00.00 ROUTINE GENERAL MEDICAL EXAMINATION AT A HEALTH CARE FACILITY: ICD-10-CM

## 2020-07-14 LAB — HCG INTACT+B SERPL-ACNC: 115.3 MIU/ML

## 2020-07-14 PROCEDURE — 84702 CHORIONIC GONADOTROPIN TEST: CPT | Performed by: OBSTETRICS & GYNECOLOGY

## 2020-09-28 PROBLEM — E66.01 MORBID OBESITY WITH BMI OF 45.0-49.9, ADULT (HCC): Status: ACTIVE | Noted: 2020-09-28

## 2020-09-28 PROBLEM — Z87.59 HISTORY OF ECTOPIC PREGNANCY: Status: ACTIVE | Noted: 2020-09-28

## 2020-09-28 PROBLEM — E66.01 MATERNAL MORBID OBESITY, ANTEPARTUM (HCC): Status: RESOLVED | Noted: 2018-10-15 | Resolved: 2020-09-28

## 2020-09-28 PROBLEM — Z37.9 NORMAL LABOR: Status: RESOLVED | Noted: 2019-01-27 | Resolved: 2020-09-28

## 2020-09-28 PROBLEM — O99.210 MATERNAL MORBID OBESITY, ANTEPARTUM (HCC): Status: RESOLVED | Noted: 2018-10-15 | Resolved: 2020-09-28

## 2020-09-28 PROBLEM — O35.BXX0 ECHOGENIC FOCUS OF HEART OF FETUS AFFECTING ANTEPARTUM CARE OF MOTHER: Status: RESOLVED | Noted: 2018-10-15 | Resolved: 2020-09-28

## 2020-11-23 ENCOUNTER — TELEPHONE (OUTPATIENT)
Dept: OBSTETRICS AND GYNECOLOGY | Facility: CLINIC | Age: 25
End: 2020-11-23

## 2021-06-02 ENCOUNTER — LAB (OUTPATIENT)
Dept: OBSTETRICS AND GYNECOLOGY | Facility: CLINIC | Age: 26
End: 2021-06-02

## 2021-06-02 ENCOUNTER — TELEPHONE (OUTPATIENT)
Dept: OBSTETRICS AND GYNECOLOGY | Facility: CLINIC | Age: 26
End: 2021-06-02

## 2021-06-02 DIAGNOSIS — Z3A.01 LESS THAN 8 WEEKS GESTATION OF PREGNANCY: ICD-10-CM

## 2021-06-02 DIAGNOSIS — Z87.59 HISTORY OF ECTOPIC PREGNANCY: ICD-10-CM

## 2021-06-02 DIAGNOSIS — Z87.59 HISTORY OF ECTOPIC PREGNANCY: Primary | ICD-10-CM

## 2021-06-02 NOTE — TELEPHONE ENCOUNTER
LMP 5/10/21. Positive pregnancy test on yesterday. Denies spotting. Mild cramping. Prior miscarriage occurred early in pregnancy; found to be an ectopic pregnancy at 4-5 weeks.    Patient will come for hCG and progesterone today during lunch break; redraw on Friday.    Orders on patient chart for hCG and progesterone; please release upon patient arrival.

## 2021-06-03 LAB
HCG INTACT+B SERPL-ACNC: 22.21 MIU/ML
PROGEST SERPL-MCNC: 10 NG/ML

## 2021-06-04 ENCOUNTER — LAB (OUTPATIENT)
Dept: OBSTETRICS AND GYNECOLOGY | Facility: CLINIC | Age: 26
End: 2021-06-04

## 2021-06-04 DIAGNOSIS — Z87.59 HISTORY OF ECTOPIC PREGNANCY: Primary | ICD-10-CM

## 2021-06-04 DIAGNOSIS — Z3A.01 LESS THAN 8 WEEKS GESTATION OF PREGNANCY: ICD-10-CM

## 2021-06-04 LAB — HCG INTACT+B SERPL-ACNC: 84.13 MIU/ML

## 2021-06-07 ENCOUNTER — LAB (OUTPATIENT)
Dept: OBSTETRICS AND GYNECOLOGY | Facility: CLINIC | Age: 26
End: 2021-06-07

## 2021-06-07 DIAGNOSIS — O20.0 THREATENED ABORTION: Primary | ICD-10-CM

## 2021-06-08 LAB — HCG INTACT+B SERPL-ACNC: 407.4 MIU/ML

## 2021-06-23 ENCOUNTER — OFFICE VISIT (OUTPATIENT)
Dept: OBSTETRICS AND GYNECOLOGY | Facility: CLINIC | Age: 26
End: 2021-06-23

## 2021-06-23 VITALS
BODY MASS INDEX: 41.95 KG/M2 | HEIGHT: 70 IN | DIASTOLIC BLOOD PRESSURE: 74 MMHG | WEIGHT: 293 LBS | SYSTOLIC BLOOD PRESSURE: 126 MMHG

## 2021-06-23 DIAGNOSIS — O20.0 THREATENED ABORTION: Primary | ICD-10-CM

## 2021-06-23 PROCEDURE — 99213 OFFICE O/P EST LOW 20 MIN: CPT | Performed by: NURSE PRACTITIONER

## 2021-06-23 PROCEDURE — 96372 THER/PROPH/DIAG INJ SC/IM: CPT | Performed by: NURSE PRACTITIONER

## 2021-06-23 NOTE — PROGRESS NOTES
Chief Complaint   Patient presents with   • Gynecologic Exam     TAB          HPI  Sabra Smart is a 25 y.o. female, , who presents with TAB. Spotting first thing this am, no recent intercourse.    Recent Tests:  She had hcgs 6/2, 6 and  Prog 10 on supp  US today: Yes.  Findings showed single viable IUP measuring 6w3d consistent with LMP.  I have personally evaluated the U/S and agree with the findings. BILL Bocanegra  She has not had prenatal care.  She denies associated abdominal or pelvic pain. Her past medical history is notable for ectopic pregnancy. Also right knee surgery  She does not have passage of tissue.  Rh Status: Negative.  Rhogam will be given today..  She also complains of nausea and fatigue    The additional following portions of the patient's history were reviewed and updated as appropriate: current medications, past family history, past medical history, past social history, past surgical history and problem list.    Review of Systems   Constitutional: Negative.    HENT: Negative.    Eyes: Negative.    Respiratory: Negative.    Cardiovascular: Negative.    Gastrointestinal: Positive for nausea.   Endocrine: Negative.    Genitourinary: Negative.    Musculoskeletal: Negative.    Skin: Negative.    Allergic/Immunologic: Negative.    Neurological: Negative.    Hematological: Negative.    Psychiatric/Behavioral: Negative.      All other systems reviewed and are negative.     I have reviewed and agree with the HPI, ROS, and historical information as entered above. Geoffrey Garcia RN    Objective   There were no vitals taken for this visit.    Physical Exam  Vitals and nursing note reviewed.   Constitutional:       Appearance: Normal appearance.   Neurological:      Mental Status: She is alert.            Assessment and Plan    Problem List Items Addressed This Visit     None      Threatened   Spotting in early pregnancy  MBT= A negative    1. Threatened  AB  2. Pelvic Rest.  No douching, intercourse or use of tampons.  3. Call for an increase in bleeding, abdominal pain, or fever.  4. Rhogam given today.  5. Has NOB appt. Scheduled with  in 1 week    Counseling was given to patient for the following topics: diagnostic results, instructions for management, risk factor reductions and prognosis . Total time of the encounter was 55 minutes and 20 minutes was spend counseling.      Geoffrey Garcia RN  06/23/2021

## 2021-06-28 PROBLEM — Z34.90 PREGNANCY: Status: ACTIVE | Noted: 2021-06-28

## 2021-06-29 ENCOUNTER — INITIAL PRENATAL (OUTPATIENT)
Dept: OBSTETRICS AND GYNECOLOGY | Facility: CLINIC | Age: 26
End: 2021-06-29

## 2021-06-29 VITALS — SYSTOLIC BLOOD PRESSURE: 124 MMHG | WEIGHT: 293 LBS | BODY MASS INDEX: 44.91 KG/M2 | DIASTOLIC BLOOD PRESSURE: 72 MMHG

## 2021-06-29 DIAGNOSIS — Z3A.01 LESS THAN 8 WEEKS GESTATION OF PREGNANCY: Primary | ICD-10-CM

## 2021-06-29 DIAGNOSIS — O99.210 OBESITY AFFECTING PREGNANCY, ANTEPARTUM: ICD-10-CM

## 2021-06-29 PROBLEM — Z67.91 RH NEGATIVE, ANTEPARTUM: Status: ACTIVE | Noted: 2021-06-29

## 2021-06-29 PROBLEM — O26.899 RH NEGATIVE, ANTEPARTUM: Status: ACTIVE | Noted: 2021-06-29

## 2021-06-29 PROCEDURE — 0501F PRENATAL FLOW SHEET: CPT | Performed by: OBSTETRICS & GYNECOLOGY

## 2021-06-29 NOTE — PROGRESS NOTES
Initial ob visit     CC- Here for care of pregnancy        Sabra Smart is a 25 y.o. female, , who presents for her first obstetrical visit.  Her last LMP was Patient's last menstrual period was 05/10/2021 (exact date)..    OB History    Para Term  AB Living   3 1 1 0 1 1   SAB TAB Ectopic Molar Multiple Live Births   0 0 1 0 0 1      # Outcome Date GA Lbr Carl/2nd Weight Sex Delivery Anes PTL Lv   3 Current            2 Ectopic 20           1 Term 19 39w2d 11:05  02:50 3655 g (8 lb 0.9 oz) F Vag-Spont EPI N CARLY      Complications: Shoulder Dystocia       Initial positive test date : 21, UPT          Prior obstetric issues, potential pregnancy concerns: h/o shoulder dystocia  Family history of genetic issues (includes FOB): no  Prior infections concerning in pregnancy (Rash, fever in last 2 weeks): no  Varicella Hx -yes  Prior testing for Cystic Fibrosis Carrier or Sickle Cell Trait- no  Prepregnancy BMI - Body mass index is 44.91 kg/m².  History of STD: no  Ultrasound Today: Yes.  Findings showed viable IUP 7w1d.Fetal heart rate 148. I have personally evaluated the U/S and agree with the findings. Adwoa Lawrence MD    Additional Pertinent History   Last Pap :   Last Completed Pap Smear          PAP SMEAR (Every 3 Years) Next due on 3/24/2023    2020  Done - neg              History of abnormal Pap smear: no  Family history of uterine, colon, breast, or ovarian cancer: yes - MGM-Ovarian CA  Feelings of Anxiety or Depression: no  Tobacco Usage?: No   Alcohol/Drug Use?: NO  Over the age of 35 at delivery: no  Desires Genetic Screening: Cell Free DNA  Flu Status: Already given in current flu season    PMH  Past Medical History:   Diagnosis Date   • Ectopic pregnancy    • Morbid obesity with BMI of 40.0-44.9, adult (CMS/HCC)    •  (spontaneous vaginal delivery)        Current Outpatient Medications:   •  Prenatal Vit-Fe Fumarate-FA (PRENATAL -) 27-1 MG  tablet tablet, Take 1 tablet by mouth Daily., Disp: , Rfl:     The additional following portions of the patient's history were reviewed and updated as appropriate: allergies, current medications, past family history, past medical history, past social history, past surgical history and problem list.    Review of Systems   Review of Systems  Current obstetric complaints : Nausea and Vomiting. Pt had spotting last week on 21 and received Rhogam injection. Denies spotting since then.  All systems reviewed and otherwise normal.    I have reviewed and agree with the HPI, ROS, and historical information as entered above. Adwoa Lawrence MD    /72   Wt (!) 142 kg (313 lb)   LMP 05/10/2021 (Exact Date)   BMI 44.91 kg/m²     Physical Exam  General:  well developed; well nourished  no acute distress   Chest/Respiratory: No labored breathing, normal respiratory effort, normal appearance, no respiratory noises noted   Heart:  normal rate, regular rhythm,  no murmurs, rubs, or gallops   Thyroid: normal to inspection and palpation   Breasts:  Not performed.   Abdomen: soft, non-tender; no masses  no umbilical or inguinal hernias are present  no hepato-splenomegaly   Pelvis: Not performed.        Assessment and Plan    Problem List Items Addressed This Visit        Gravid and     Shoulder dystocia during labor and delivery    Overview     Discussed all options.  Patient had a significant shoulder dystocia on first pregnancy.  Plan for now right  section at 39 weeks.         Obesity affecting pregnancy, antepartum    Overview     BMI 44.9 at new OB visit.  We discussed the 10 to 15 pound weight gain during pregnancy.         Relevant Orders    Hemoglobin A1c    TSH    Pregnancy - Primary    Overview     2019  at 39 weeks +2 days baby girl [place] weighing 8 pounds 1 ounce.  Second-degree laceration and shoulder dystocia         Relevant Orders    Obstetric Panel    HIV-1 / O / 2 Ag /  Antibody 4th Generation    Urine Culture - Urine, Urine, Clean Catch    Chlamydia trachomatis, Neisseria gonorrhoeae, PCR w/ confirmation - Urine, Urine, Clean Catch          1. Pregnancy at 7w1d  2. Reviewed routine prenatal care with the office and educational materials given  3. Lab(s) Ordered  4. Discussed options for genetic testing including first trimester nuchal translucency screen, genetic disease carrier testing, quadruple screen, and Anderson.  5. Patient is on Prenatal vitamins  6. Activity recommendation : 150 minutes/week of moderate intensity aerobic activity unless we limit for bleeding, hypertension or other pregnancy complication   Return in about 5 weeks (around 8/3/2021).      Adwoa Lawrence MD  06/29/2021

## 2021-06-30 LAB
HBA1C MFR BLD: 5.2 % (ref 4.8–5.6)
TSH SERPL DL<=0.005 MIU/L-ACNC: 0.64 UIU/ML (ref 0.27–4.2)

## 2021-07-02 LAB
ABO GROUP BLD: NORMAL
BACTERIA UR CULT: NORMAL
BACTERIA UR CULT: NORMAL
BASOPHILS # BLD AUTO: 0 X10E3/UL (ref 0–0.2)
BASOPHILS NFR BLD AUTO: 1 %
BLD GP AB SCN SERPL QL: NORMAL
BLD GP AB SCN SERPL QL: POSITIVE
BLOOD GROUP ANTIBODIES SERPL: ABNORMAL
C TRACH RRNA SPEC QL NAA+PROBE: NEGATIVE
EOSINOPHIL # BLD AUTO: 0.1 X10E3/UL (ref 0–0.4)
EOSINOPHIL NFR BLD AUTO: 1 %
ERYTHROCYTE [DISTWIDTH] IN BLOOD BY AUTOMATED COUNT: 13.7 % (ref 11.7–15.4)
HBV SURFACE AG SERPL QL IA: NEGATIVE
HCT VFR BLD AUTO: 40.1 % (ref 34–46.6)
HCV AB S/CO SERPL IA: <0.1 S/CO RATIO (ref 0–0.9)
HGB BLD-MCNC: 13.1 G/DL (ref 11.1–15.9)
HIV 1+2 AB+HIV1 P24 AG SERPL QL IA: NON REACTIVE
IMM GRANULOCYTES # BLD AUTO: 0 X10E3/UL (ref 0–0.1)
IMM GRANULOCYTES NFR BLD AUTO: 0 %
LYMPHOCYTES # BLD AUTO: 1.2 X10E3/UL (ref 0.7–3.1)
LYMPHOCYTES NFR BLD AUTO: 21 %
MCH RBC QN AUTO: 28.2 PG (ref 26.6–33)
MCHC RBC AUTO-ENTMCNC: 32.7 G/DL (ref 31.5–35.7)
MCV RBC AUTO: 86 FL (ref 79–97)
MONOCYTES # BLD AUTO: 0.4 X10E3/UL (ref 0.1–0.9)
MONOCYTES NFR BLD AUTO: 7 %
N GONORRHOEA RRNA SPEC QL NAA+PROBE: NEGATIVE
NEUTROPHILS # BLD AUTO: 4.2 X10E3/UL (ref 1.4–7)
NEUTROPHILS NFR BLD AUTO: 70 %
PLATELET # BLD AUTO: 264 X10E3/UL (ref 150–450)
RBC # BLD AUTO: 4.65 X10E6/UL (ref 3.77–5.28)
RH BLD: NEGATIVE
RPR SER QL: NON REACTIVE
RUBV IGG SERPL IA-ACNC: 2.44 INDEX
WBC # BLD AUTO: 5.9 X10E3/UL (ref 3.4–10.8)
XXX BLOOD GROUP AB TITR SERPL AHG: ABNORMAL {TITER}

## 2021-08-03 ENCOUNTER — ROUTINE PRENATAL (OUTPATIENT)
Dept: OBSTETRICS AND GYNECOLOGY | Facility: CLINIC | Age: 26
End: 2021-08-03

## 2021-08-03 VITALS — SYSTOLIC BLOOD PRESSURE: 114 MMHG | DIASTOLIC BLOOD PRESSURE: 64 MMHG | BODY MASS INDEX: 44.74 KG/M2 | WEIGHT: 293 LBS

## 2021-08-03 DIAGNOSIS — Z67.91 RH NEGATIVE, ANTEPARTUM: ICD-10-CM

## 2021-08-03 DIAGNOSIS — O26.899 RH NEGATIVE, ANTEPARTUM: ICD-10-CM

## 2021-08-03 DIAGNOSIS — O99.210 OBESITY AFFECTING PREGNANCY, ANTEPARTUM: ICD-10-CM

## 2021-08-03 DIAGNOSIS — Z3A.12 12 WEEKS GESTATION OF PREGNANCY: Primary | ICD-10-CM

## 2021-08-03 LAB
EXPIRATION DATE: 0
GLUCOSE UR STRIP-MCNC: NEGATIVE MG/DL
Lab: 0
PROT UR STRIP-MCNC: NEGATIVE MG/DL

## 2021-08-03 PROCEDURE — 0502F SUBSEQUENT PRENATAL CARE: CPT | Performed by: OBSTETRICS & GYNECOLOGY

## 2021-08-03 NOTE — PROGRESS NOTES
OB FOLLOW UP  CC- Here for care of pregnancy        Sabra Smart is a 25 y.o.  12w1d patient being seen today for her obstetrical follow up visit. Patient reports no complaints.     Her prenatal care is complicated by (and status) :    Patient Active Problem List   Diagnosis   • Vaginal delivery   • Shoulder dystocia during labor and delivery   • Obesity affecting pregnancy, antepartum   • History of ectopic pregnancy   • Pregnancy   • Rh negative, antepartum       Desires genetic testing?: Yes with Gender  Flu Status: not flu season   Ultrasound Today: No.    ROS -   Patient Reports : No Problems  Patient Denies: Loss of Fluid, Vaginal Spotting, Vision Changes, Headaches, Nausea  and Vomiting   Fetal Movement : denies   All other systems reviewed and are negative.     The additional following portions of the patient's history were reviewed and updated as appropriate: allergies and current medications.    I have reviewed and agree with the HPI, ROS, and historical information as entered above. Adwoa Lawrence MD    /64   Wt (!) 141 kg (311 lb 12.8 oz)   LMP 05/10/2021 (Exact Date)   BMI 44.74 kg/m²         EXAM:     FHT: 155 BPM   Uterine Size: size equals dates  Pelvic Exam: No       Assessment and Plan    Problem List Items Addressed This Visit        Gravid and     Shoulder dystocia during labor and delivery    Overview     Discussed all options.  Patient had a significant shoulder dystocia on first pregnancy.  Plan for now right  section at 39 weeks.  Primary  section scheduled 2022 at 7:30 AM         Obesity affecting pregnancy, antepartum    Overview     BMI 44.9 at new OB visit.  We discussed the 10 to 15 pound weight gain during pregnancy.         Pregnancy - Primary    Overview     2019  at 39 weeks +2 days baby girl [place] weighing 8 pounds 1 ounce.  Second-degree laceration and shoulder dystocia         Relevant Orders    POC Glucose,  Urine, Qualitative, Dipstick (Completed)    POC Protein, Urine, Qualitative, Dipstick (Completed)    [id number] PLUS Core+SCA+ESS - Blood,    Rh negative, antepartum    Overview     Status post RhoGam on 6/23/2021 for spotting               1. Pregnancy at 12w1d  2. Labs reviewed from New OB Visit.  3. Activity and Exercise discussed.  Return in about 4 weeks (around 8/31/2021).    Adwoa Lawrence MD  08/03/2021

## 2021-08-31 ENCOUNTER — ROUTINE PRENATAL (OUTPATIENT)
Dept: OBSTETRICS AND GYNECOLOGY | Facility: CLINIC | Age: 26
End: 2021-08-31

## 2021-08-31 VITALS — BODY MASS INDEX: 44.62 KG/M2 | DIASTOLIC BLOOD PRESSURE: 70 MMHG | SYSTOLIC BLOOD PRESSURE: 110 MMHG | WEIGHT: 293 LBS

## 2021-08-31 DIAGNOSIS — Z3A.16 16 WEEKS GESTATION OF PREGNANCY: Primary | ICD-10-CM

## 2021-08-31 DIAGNOSIS — O26.899 RH NEGATIVE, ANTEPARTUM: ICD-10-CM

## 2021-08-31 DIAGNOSIS — O99.210 OBESITY AFFECTING PREGNANCY, ANTEPARTUM: ICD-10-CM

## 2021-08-31 DIAGNOSIS — Z3A.20 20 WEEKS GESTATION OF PREGNANCY: ICD-10-CM

## 2021-08-31 DIAGNOSIS — Z67.91 RH NEGATIVE, ANTEPARTUM: ICD-10-CM

## 2021-08-31 LAB
GLUCOSE UR STRIP-MCNC: NEGATIVE MG/DL
PROT UR STRIP-MCNC: NEGATIVE MG/DL

## 2021-08-31 PROCEDURE — 0502F SUBSEQUENT PRENATAL CARE: CPT | Performed by: NURSE PRACTITIONER

## 2021-08-31 NOTE — PROGRESS NOTES
OB FOLLOW UP  CC- Here for care of pregnancy        Sabra Smart is a 26 y.o.  16w1d patient being seen today for her obstetrical follow up visit. Patient reports no complaints.     Her prenatal care is complicated by (and status) :    Patient Active Problem List   Diagnosis   • Vaginal delivery   • Shoulder dystocia during labor and delivery   • Obesity affecting pregnancy, antepartum   • History of ectopic pregnancy   • Pregnancy   • Rh negative, antepartum         Ultrasound Today: No.    ROS -   Patient Reports : No Problems  Patient Denies: Loss of Fluid, Vaginal Spotting, Vision Changes, Headaches, Nausea , Vomiting  and Contractions  Fetal Movement : normal  All other systems reviewed and are negative.       The additional following portions of the patient's history were reviewed and updated as appropriate: allergies, current medications, past family history, past medical history, past social history, past surgical history and problem list.    I have reviewed and agree with the HPI, ROS, and historical information as entered above. Armida Donaldson, APRN    /70   Wt (!) 141 kg (311 lb)   LMP 05/10/2021 (Exact Date)   BMI 44.62 kg/m²       EXAM:     FHT: 140 BPM     Urine glucose/protein: See prenatal flowsheet       Assessment and Plan    Problem List Items Addressed This Visit        Gravid and     Vaginal delivery    Shoulder dystocia during labor and delivery    Overview     Discussed all options.  Patient had a significant shoulder dystocia on first pregnancy.  Plan for now right  section at 39 weeks.  Primary  section scheduled 2022 at 7:30 AM         Obesity affecting pregnancy, antepartum    Overview     BMI 44.9 at new OB visit.  We discussed the 10 to 15 pound weight gain during pregnancy.         Pregnancy - Primary    Overview     2019  at 39 weeks +2 days baby girl [place] weighing 8 pounds 1 ounce.  Second-degree laceration and shoulder  dystocia  CF DNA negative girl!  Naming her Kelly Lutz         Relevant Orders    POC Urinalysis Dipstick (Completed)    Alpha Fetoprotein, Maternal    US Ob Detail Fetal Anatomy Single or First Gestation    Rh negative, antepartum    Overview     Status post RhoGam on 6/23/2021 for spotting               1. Pregnancy at 16w1d  2. Fetal status reassuring.   3. Desires AFP.   Return in about 4 weeks (around 9/28/2021) for ultrasound anatomy scan .    Armida Donaldson, APRN  08/31/2021

## 2021-09-02 LAB
AFP ADJ MOM SERPL: 1.13
AFP INTERP SERPL-IMP: NORMAL
AFP INTERP SERPL-IMP: NORMAL
AFP SERPL-MCNC: 26 NG/ML
AGE AT DELIVERY: 24.4 YR
GA METHOD: NORMAL
GA: 16.1 WEEKS
IDDM PATIENT QL: NO
LABORATORY COMMENT REPORT: NORMAL
MULTIPLE PREGNANCY: NO
NEURAL TUBE DEFECT RISK FETUS: 8106 %
RESULT: NORMAL

## 2021-09-28 ENCOUNTER — ROUTINE PRENATAL (OUTPATIENT)
Dept: OBSTETRICS AND GYNECOLOGY | Facility: CLINIC | Age: 26
End: 2021-09-28

## 2021-09-28 VITALS — SYSTOLIC BLOOD PRESSURE: 110 MMHG | DIASTOLIC BLOOD PRESSURE: 68 MMHG | BODY MASS INDEX: 44.88 KG/M2 | WEIGHT: 293 LBS

## 2021-09-28 DIAGNOSIS — O99.210 OBESITY AFFECTING PREGNANCY, ANTEPARTUM: ICD-10-CM

## 2021-09-28 DIAGNOSIS — Z3A.20 20 WEEKS GESTATION OF PREGNANCY: Primary | ICD-10-CM

## 2021-09-28 DIAGNOSIS — O26.899 RH NEGATIVE, ANTEPARTUM: ICD-10-CM

## 2021-09-28 DIAGNOSIS — Z67.91 RH NEGATIVE, ANTEPARTUM: ICD-10-CM

## 2021-09-28 LAB
GLUCOSE UR STRIP-MCNC: NEGATIVE MG/DL
PROT UR STRIP-MCNC: NEGATIVE MG/DL

## 2021-09-28 PROCEDURE — 0502F SUBSEQUENT PRENATAL CARE: CPT | Performed by: OBSTETRICS & GYNECOLOGY

## 2021-09-28 NOTE — PROGRESS NOTES
OB FOLLOW UP  CC- Here for care of pregnancy        Sabra Smart is a 26 y.o.  20w1d patient being seen today for her obstetrical follow up visit. Patient reports no complaints.     Her prenatal care is complicated by (and status) :    Patient Active Problem List   Diagnosis   • Vaginal delivery   • Shoulder dystocia during labor and delivery   • Obesity affecting pregnancy, antepartum   • History of ectopic pregnancy   • Pregnancy   • Rh negative, antepartum       Patient would like to discuss the Covid vaccine.  Patient had shoulder dystocia last pregnancy and would like to discuss delivery options.        Flu Status: Desires at future appt  Ultrasound Today: Yes.  Findings showed female infant in breech presentation with fetal heart rate of 142.  Anterior placenta, normal fluid volume.  Size consistent with dates.  No fetal anomaly seen, however suboptimal views of heart, profile, and diaphragm.  Nuchal length 33 mm.  I have personally evaluated the U/S and agree with the findings. Adwoa Lawrence MD    ROS -   Patient Reports : No Problems  Patient Denies: Loss of Fluid, Vaginal Spotting, Vision Changes, Headaches, Nausea , Vomiting , Contractions and Epigastric pain  Fetal Movement : normal  All other systems reviewed and are negative.       The additional following portions of the patient's history were reviewed and updated as appropriate: allergies, current medications, past family history, past medical history, past social history, past surgical history and problem list.    I have reviewed and agree with the HPI, ROS, and historical information as entered above. Adwoa Lawrence MD    /68   Wt (!) 142 kg (312 lb 12.8 oz)   LMP 05/10/2021 (Exact Date)   BMI 44.88 kg/m²       EXAM:     FHT: 142 BPM   Uterine Size: size equals dates  Pelvic Exam: No    Urine glucose/protein: See prenatal flowsheet       Assessment and Plan    Problem List Items Addressed This Visit         Gravid and     Shoulder dystocia during labor and delivery    Overview     Discussed all options.  Patient had a significant shoulder dystocia on first pregnancy.  Plan for now right  section at 39 weeks.  Primary  section scheduled 2022 at 7:30 AM         Obesity affecting pregnancy, antepartum    Overview     BMI 44.9 at new OB visit.  We discussed the 10 to 15 pound weight gain during pregnancy.         Pregnancy - Primary    Overview     2019  at 39 weeks +2 days baby girl Nay weighing 8 pounds 1 ounce.  Second-degree laceration and shoulder dystocia  CF DNA negative girl!  Naming her Kelly Lutz         Relevant Orders    POC Urinalysis Dipstick (Completed)    US Ob Follow Up Transabdominal Approach    Rh negative, antepartum    Overview     Status post RhoGam on 2021 for spotting               1. Pregnancy at 20w1d  2. Fetal status reassuring.   3. Activity and Exercise discussed.  Return in about 4 weeks (around 10/26/2021) for ultrasound.    Adwoa Lawrence MD  2021

## 2021-10-26 ENCOUNTER — ROUTINE PRENATAL (OUTPATIENT)
Dept: OBSTETRICS AND GYNECOLOGY | Facility: CLINIC | Age: 26
End: 2021-10-26

## 2021-10-26 VITALS — SYSTOLIC BLOOD PRESSURE: 108 MMHG | WEIGHT: 293 LBS | DIASTOLIC BLOOD PRESSURE: 64 MMHG | BODY MASS INDEX: 44.8 KG/M2

## 2021-10-26 DIAGNOSIS — O26.899 RH NEGATIVE, ANTEPARTUM: ICD-10-CM

## 2021-10-26 DIAGNOSIS — Z34.90 PREGNANCY, UNSPECIFIED GESTATIONAL AGE: ICD-10-CM

## 2021-10-26 DIAGNOSIS — O99.210 OBESITY AFFECTING PREGNANCY, ANTEPARTUM: Primary | ICD-10-CM

## 2021-10-26 DIAGNOSIS — Z67.91 RH NEGATIVE, ANTEPARTUM: ICD-10-CM

## 2021-10-26 DIAGNOSIS — Z3A.24 24 WEEKS GESTATION OF PREGNANCY: ICD-10-CM

## 2021-10-26 LAB
EXPIRATION DATE: 0
GLUCOSE UR STRIP-MCNC: NEGATIVE MG/DL
Lab: 0
PROT UR STRIP-MCNC: ABNORMAL MG/DL

## 2021-10-26 PROCEDURE — 0502F SUBSEQUENT PRENATAL CARE: CPT | Performed by: OBSTETRICS & GYNECOLOGY

## 2021-10-26 NOTE — PROGRESS NOTES
OB FOLLOW UP  CC- Here for care of pregnancy        Sabra Smart is a 26 y.o.  24w1d patient being seen today for her obstetrical follow up visit. Patient reports mild nausea (not taking anything for it) and occasional [place] (with exercise). Patient reports she has not been drinking much water and this was her 2nd void. 28 week visit prep today.    Her prenatal care is complicated by (and status) :    Patient Active Problem List   Diagnosis   • Vaginal delivery   • Shoulder dystocia during labor and delivery   • Obesity affecting pregnancy, antepartum   • History of ectopic pregnancy   • Pregnancy   • Rh negative, antepartum       Flu Status: has not yet gotten; 1st COVID vaccine received at the beginning of this month  Ultrasound Today: Yes.  Findings showed follow up evaluation of fetal anatomy within normal limits.Female infant in vertex presentation with fetal heart rate of 146.  Estimated fetal weight 1 pound 9 ounces which is 54th percentile.  Size consistent with dates.  Heart views seen and normal.  Profile and nose and lips are also normal.  I have personally evaluated the U/S and agree with the findings. Adwoa Lawrence MD    ROS -   Patient Reports: see above  Patient Denies: Loss of Fluid, Vaginal Spotting, Vision Changes, Headaches, Vomiting  and Epigastric pain  Fetal Movement: normal  All other systems reviewed and are negative.       The additional following portions of the patient's history were reviewed and updated as appropriate: allergies, current medications, past family history, past medical history, past social history, past surgical history and problem list.    I have reviewed and agree with the HPI, ROS, and historical information as entered above. Adwoa Lawrence MD    /64   Wt (!) 142 kg (312 lb 3.2 oz)   LMP 05/10/2021 (Exact Date)   BMI 44.80 kg/m²       EXAM:     FHT: 146 BPM   Uterine Size: size equals dates  Pelvic Exam: No    Urine  glucose/protein: See prenatal flowsheet       Assessment and Plan    Problem List Items Addressed This Visit        Gravid and     Shoulder dystocia during labor and delivery    Overview     Discussed all options.  Patient had a significant shoulder dystocia on first pregnancy.  Plan for now right  section at 39 weeks.  Primary  section scheduled 2022 at 7:30 AM         Obesity affecting pregnancy, antepartum - Primary    Overview     BMI 44.9 at new OB visit.  We discussed the 10 to 15 pound weight gain during pregnancy.         Pregnancy    Overview     2019  at 39 weeks +2 days baby girl Nay weighing 8 pounds 1 ounce.  Second-degree laceration and shoulder dystocia  CF DNA negative girl!  Naming her Kelly Lutz         Relevant Orders    POC Glucose, Urine, Qualitative, Dipstick (Completed)    POC Protein, Urine, Qualitative, Dipstick (Completed)    Rh negative, antepartum    Overview     Status post RhoGam on 2021 for spotting               1. Pregnancy at 24w1d  2. Fetal status reassuring.   3. Activity and Exercise discussed.  Return in about 4 weeks (around 2021) for glucola.    Adwoa Lawrence MD  10/26/2021

## 2021-11-24 ENCOUNTER — ROUTINE PRENATAL (OUTPATIENT)
Dept: OBSTETRICS AND GYNECOLOGY | Facility: CLINIC | Age: 26
End: 2021-11-24

## 2021-11-24 VITALS — WEIGHT: 293 LBS | BODY MASS INDEX: 45.17 KG/M2 | SYSTOLIC BLOOD PRESSURE: 106 MMHG | DIASTOLIC BLOOD PRESSURE: 66 MMHG

## 2021-11-24 DIAGNOSIS — Z3A.28 28 WEEKS GESTATION OF PREGNANCY: Primary | ICD-10-CM

## 2021-11-24 LAB
CLARITY, POC: CLEAR
COLOR UR: YELLOW
GLUCOSE UR STRIP-MCNC: NEGATIVE MG/DL
PROT UR STRIP-MCNC: NEGATIVE MG/DL

## 2021-11-24 PROCEDURE — 90715 TDAP VACCINE 7 YRS/> IM: CPT | Performed by: NURSE PRACTITIONER

## 2021-11-24 PROCEDURE — 96372 THER/PROPH/DIAG INJ SC/IM: CPT | Performed by: NURSE PRACTITIONER

## 2021-11-24 PROCEDURE — 0502F SUBSEQUENT PRENATAL CARE: CPT | Performed by: NURSE PRACTITIONER

## 2021-11-24 PROCEDURE — 90471 IMMUNIZATION ADMIN: CPT | Performed by: NURSE PRACTITIONER

## 2021-11-24 NOTE — PROGRESS NOTES
OB FOLLOW UP  CC- Here for care of pregnancy        Sabra Smart is a 26 y.o.  28w2d patient being seen today for her obstetrical follow up. Patient reports back pain and hip pain. Patient stated that her lower back and hip pain is all the time.    Patient undergoing Glucola testing today. She is due for her testing at 11:32 am.     MBT: A-  Rhogam Given: Yes  TDAP: given today  Breast Pump: prescription given  Flu Status: Desires at future appt  Ultrasound Today: No.    Her prenatal care is complicated by (and status) :    Patient Active Problem List   Diagnosis   • Vaginal delivery   • Shoulder dystocia during labor and delivery   • Obesity affecting pregnancy, antepartum   • History of ectopic pregnancy   • Pregnancy   • Rh negative, antepartum         ROS -   Patient Reports : Back pain and Hip pain  Patient Denies: Loss of Fluid, Vaginal Spotting, Vision Changes, Headaches, Contractions and Epigastric pain  Fetal Movement : normal    The additional following portions of the patient's history were reviewed and updated as appropriate: allergies and current medications.    I have reviewed and agree with the HPI, ROS, and historical information as entered above. Chanda Quintero, APRN    /66   Wt (!) 143 kg (314 lb 12.8 oz)   LMP 05/10/2021 (Exact Date)   BMI 45.17 kg/m²         EXAM:     FHT: pos BPM     Pelvic Exam: No       Assessment and Plan    Problem List Items Addressed This Visit        Gravid and     Shoulder dystocia during labor and delivery    Overview     Discussed all options.  Patient had a significant shoulder dystocia on first pregnancy.  Plan for now right  section at 39 weeks.  Primary  section scheduled 2022 at 7:30 AM         Pregnancy - Primary    Overview     2019  at 39 weeks +2 days baby girl Nay weighing 8 pounds 1 ounce.  Second-degree laceration and shoulder dystocia  CF DNA negative girl!  Naming her Kelly Lutz          Relevant Orders    POC Urinalysis Dipstick (Completed)    Gestational Screen 1 Hr ([facility])    CBC (No Diff)    Antibody Screen          1. Pregnancy at 28w2d  2. Fetal status reassuring.   3. Activity and Exercise discussed.  Return in about 4 weeks (around 12/22/2021) for JNA.   Darío candelaria reviewed  TDAP and rhogam today    Chanda Quintero, APRN  11/24/2021

## 2021-11-26 LAB
BLD GP AB SCN SERPL QL: NEGATIVE
ERYTHROCYTE [DISTWIDTH] IN BLOOD BY AUTOMATED COUNT: 13.3 % (ref 12.3–15.4)
GLUCOSE 1H P 50 G GLC PO SERPL-MCNC: 111 MG/DL (ref 65–139)
HCT VFR BLD AUTO: 32.5 % (ref 34–46.6)
HGB BLD-MCNC: 11.2 G/DL (ref 12–15.9)
MCH RBC QN AUTO: 29.1 PG (ref 26.6–33)
MCHC RBC AUTO-ENTMCNC: 34.5 G/DL (ref 31.5–35.7)
MCV RBC AUTO: 84.4 FL (ref 79–97)
PLATELET # BLD AUTO: 205 10*3/MM3 (ref 140–450)
RBC # BLD AUTO: 3.85 10*6/MM3 (ref 3.77–5.28)
WBC # BLD AUTO: 5.84 10*3/MM3 (ref 3.4–10.8)

## 2021-12-23 ENCOUNTER — ROUTINE PRENATAL (OUTPATIENT)
Dept: OBSTETRICS AND GYNECOLOGY | Facility: CLINIC | Age: 26
End: 2021-12-23

## 2021-12-23 VITALS — WEIGHT: 293 LBS | SYSTOLIC BLOOD PRESSURE: 110 MMHG | BODY MASS INDEX: 45.17 KG/M2 | DIASTOLIC BLOOD PRESSURE: 70 MMHG

## 2021-12-23 DIAGNOSIS — Z3A.32 32 WEEKS GESTATION OF PREGNANCY: Primary | ICD-10-CM

## 2021-12-23 LAB
CLARITY, POC: CLEAR
COLOR UR: NORMAL
GLUCOSE UR STRIP-MCNC: NEGATIVE MG/DL
PROT UR STRIP-MCNC: NEGATIVE MG/DL

## 2021-12-23 PROCEDURE — 0502F SUBSEQUENT PRENATAL CARE: CPT | Performed by: NURSE PRACTITIONER

## 2021-12-23 NOTE — PROGRESS NOTES
OB FOLLOW UP  CC- Here for care of pregnancy        Sabra Smart is a 26 y.o.  32w3d patient being seen today for her obstetrical follow up visit. Patient reports occasional cramping. Patient stated that the pain level was mild to moderate. Patient stated that the cramping lasted about 10 minutes, would stop and then start again.     Her prenatal care is complicated by (and status) :    Patient Active Problem List   Diagnosis   • Vaginal delivery   • Shoulder dystocia during labor and delivery   • Obesity affecting pregnancy, antepartum   • History of ectopic pregnancy   • Pregnancy   • Rh negative, antepartum       Flu Status: Declines  Ultrasound Today: No.    ROS -   Patient Reports : Cramping  Patient Denies: Loss of Fluid, Vaginal Spotting, Vision Changes, Headaches and Contractions  Fetal Movement : normal  All other systems reviewed and are negative.       The additional following portions of the patient's history were reviewed and updated as appropriate: allergies and current medications.    I have reviewed and agree with the HPI, ROS, and historical information as entered above. Chanda Quintero, APRN    /70   Wt (!) 143 kg (314 lb 12.8 oz)   LMP 05/10/2021 (Exact Date)   BMI 45.17 kg/m²       EXAM:     FHT: pos BPM   Uterine Size: size equals dates  Pelvic Exam: No    Urine glucose/protein: See prenatal flowsheet       Assessment and Plan    Problem List Items Addressed This Visit        Gravid and     Shoulder dystocia during labor and delivery    Overview     Discussed all options.  Patient had a significant shoulder dystocia on first pregnancy.  Plan for now right  section at 39 weeks.  Primary  section scheduled 2022 at 7:30 AM  Now reconsidering trying for vaginal, U/S 34 weeks ordered         Relevant Orders    US Ob Follow Up Transabdominal Approach    Pregnancy - Primary    Overview     2019  at 39 weeks +2 days baby girl Velarde weighing  8 pounds 1 ounce.  Second-degree laceration and shoulder dystocia  CF DNA negative girl!  Naming her Kelly Lutz         Relevant Orders    POC Urinalysis Dipstick (Completed)          1. Pregnancy at 32w3d  2. Fetal status reassuring.   3. Activity and Exercise discussed.  Return in about 2 weeks (around 1/6/2022) for JNA BLADIMIR, Ultrasound JNA- hx shoulder dystocia.   U/S next for hx shoulder dystocia, considering trying for vaginal    Chanda Quintero, APRN  12/23/2021

## 2022-01-06 ENCOUNTER — ROUTINE PRENATAL (OUTPATIENT)
Dept: OBSTETRICS AND GYNECOLOGY | Facility: CLINIC | Age: 27
End: 2022-01-06

## 2022-01-06 VITALS — SYSTOLIC BLOOD PRESSURE: 102 MMHG | DIASTOLIC BLOOD PRESSURE: 68 MMHG | WEIGHT: 293 LBS | BODY MASS INDEX: 45.05 KG/M2

## 2022-01-06 DIAGNOSIS — Z3A.34 34 WEEKS GESTATION OF PREGNANCY: ICD-10-CM

## 2022-01-06 DIAGNOSIS — O26.899 RH NEGATIVE, ANTEPARTUM: ICD-10-CM

## 2022-01-06 DIAGNOSIS — Z67.91 RH NEGATIVE, ANTEPARTUM: ICD-10-CM

## 2022-01-06 DIAGNOSIS — O99.210 OBESITY AFFECTING PREGNANCY, ANTEPARTUM: Primary | ICD-10-CM

## 2022-01-06 LAB
GLUCOSE UR STRIP-MCNC: NEGATIVE MG/DL
PROT UR STRIP-MCNC: NEGATIVE MG/DL

## 2022-01-06 PROCEDURE — 0502F SUBSEQUENT PRENATAL CARE: CPT | Performed by: OBSTETRICS & GYNECOLOGY

## 2022-01-06 NOTE — PROGRESS NOTES
OB FOLLOW UP  CC- Here for care of pregnancy        Sabra Smart is a 26 y.o.  34w3d patient being seen today for her obstetrical follow up visit. Patient reports occasional contractions.     Her prenatal care is complicated by (and status) :    Patient Active Problem List   Diagnosis   • Vaginal delivery   • Shoulder dystocia during labor and delivery   • Obesity affecting pregnancy, antepartum   • History of ectopic pregnancy   • Pregnancy   • Rh negative, antepartum         Ultrasound Today: Yes.  Findings showed EFW 49th%, AC 62nd%, ESTRELLA 17.  I have personally evaluated the U/S and agree with the findings. Adwoa Lawrence MD    ROS -   Patient Reports :  ctx  Patient Denies: Loss of Fluid, Vaginal Spotting, Vision Changes and Headaches  Fetal Movement : normal  All other systems reviewed and are negative.       The additional following portions of the patient's history were reviewed and updated as appropriate: allergies, current medications, past family history, past medical history, past social history, past surgical history and problem list.    I have reviewed and agree with the HPI, ROS, and historical information as entered above. Adwoa Lawrecne MD    /68   Wt (!) 142 kg (314 lb)   LMP 05/10/2021 (Exact Date)   BMI 45.05 kg/m²       EXAM:     FHT: 162 BPM   Uterine Size: size equals dates  Pelvic Exam: No    Urine glucose/protein: See prenatal flowsheet       Assessment and Plan    Problem List Items Addressed This Visit        Gravid and     Shoulder dystocia during labor and delivery    Overview     Discussed all options.  Patient had a significant shoulder dystocia on first pregnancy.  Plan for now right  section at 39 weeks.  Primary  section scheduled 2022 at 7:30 AM  Now reconsidering trying for vaginal, U/S 34 weeks -5 pound 7 ounces which is 56th percentile.         Obesity affecting pregnancy, antepartum - Primary    Overview     BMI  44.9 at new OB visit.  We discussed the 10 to 15 pound weight gain during pregnancy.         Pregnancy    Overview     2019  at 39 weeks +2 days baby girl [place] weighing 8 pounds 1 ounce.  Second-degree laceration and shoulder dystocia  CF DNA negative girl!  Naming her Kelly Lutz         Relevant Orders    POC Urinalysis Dipstick (Completed)    Rh negative, antepartum    Overview     Status post RhoGam on 2021 for spotting               1. Pregnancy at 34w3d  2. Fetal status reassuring.   3. Activity and Exercise discussed.  No follow-ups on file.    Adwoa Lawrence MD  2022

## 2022-01-19 ENCOUNTER — TELEPHONE (OUTPATIENT)
Dept: OBSTETRICS AND GYNECOLOGY | Facility: CLINIC | Age: 27
End: 2022-01-19

## 2022-01-19 DIAGNOSIS — Z87.59 HISTORY OF SHOULDER DYSTOCIA IN PRIOR PREGNANCY: Primary | ICD-10-CM

## 2022-01-19 DIAGNOSIS — Z3A.38 38 WEEKS GESTATION OF PREGNANCY: ICD-10-CM

## 2022-01-19 DIAGNOSIS — Z86.16 HISTORY OF COVID-19: ICD-10-CM

## 2022-01-19 NOTE — TELEPHONE ENCOUNTER
Pt called said she tested positive for covid yesterday and was calling to find out what she needs to do.

## 2022-01-19 NOTE — TELEPHONE ENCOUNTER
Date of positive testin/18  Date of initial symptoms:   Symptoms reported: fatigue, body aches, congestion, runny nose, fever    May take Zinc 50mg, Vitamin C 500mg, Vitamin D 1000 units, and baby aspirin daily. Purchase a pulse oximeter from [employer]; if consistently <95% (or with signs/symptoms of respiratory distress), go to the ER for evaluation.    [employer] has not changed quarantine guidelines; patients are required to quarantine for 10 days from onset of symptoms.    Rescheduled appointment for 2/3 with 1330 U/S (hx shoulder dyst) for growth/BPP; 1410 appointment with Melinda.

## 2022-02-02 ENCOUNTER — PREP FOR SURGERY (OUTPATIENT)
Dept: OTHER | Facility: HOSPITAL | Age: 27
End: 2022-02-02

## 2022-02-02 ENCOUNTER — ROUTINE PRENATAL (OUTPATIENT)
Dept: OBSTETRICS AND GYNECOLOGY | Facility: CLINIC | Age: 27
End: 2022-02-02

## 2022-02-02 ENCOUNTER — LAB (OUTPATIENT)
Dept: LAB | Facility: HOSPITAL | Age: 27
End: 2022-02-02

## 2022-02-02 VITALS — SYSTOLIC BLOOD PRESSURE: 120 MMHG | WEIGHT: 230 LBS | BODY MASS INDEX: 33 KG/M2 | DIASTOLIC BLOOD PRESSURE: 68 MMHG

## 2022-02-02 DIAGNOSIS — O99.210 OBESITY AFFECTING PREGNANCY, ANTEPARTUM: Primary | ICD-10-CM

## 2022-02-02 DIAGNOSIS — Z3A.39 39 WEEKS GESTATION OF PREGNANCY: Primary | ICD-10-CM

## 2022-02-02 DIAGNOSIS — O26.899 RH NEGATIVE, ANTEPARTUM: ICD-10-CM

## 2022-02-02 DIAGNOSIS — Z3A.38 38 WEEKS GESTATION OF PREGNANCY: ICD-10-CM

## 2022-02-02 DIAGNOSIS — Z3A.38 38 WEEKS GESTATION OF PREGNANCY: Primary | ICD-10-CM

## 2022-02-02 DIAGNOSIS — Z67.91 RH NEGATIVE, ANTEPARTUM: ICD-10-CM

## 2022-02-02 PROCEDURE — 0502F SUBSEQUENT PRENATAL CARE: CPT | Performed by: OBSTETRICS & GYNECOLOGY

## 2022-02-02 PROCEDURE — 87081 CULTURE SCREEN ONLY: CPT

## 2022-02-02 RX ORDER — TRISODIUM CITRATE DIHYDRATE AND CITRIC ACID MONOHYDRATE 500; 334 MG/5ML; MG/5ML
30 SOLUTION ORAL ONCE
Status: CANCELLED | OUTPATIENT
Start: 2022-02-02 | End: 2022-02-02

## 2022-02-02 RX ORDER — SODIUM CHLORIDE 0.9 % (FLUSH) 0.9 %
10 SYRINGE (ML) INJECTION AS NEEDED
Status: CANCELLED | OUTPATIENT
Start: 2022-02-02

## 2022-02-02 RX ORDER — KETOROLAC TROMETHAMINE 30 MG/ML
30 INJECTION, SOLUTION INTRAMUSCULAR; INTRAVENOUS ONCE
Status: CANCELLED | OUTPATIENT
Start: 2022-02-02 | End: 2022-02-02

## 2022-02-02 RX ORDER — SODIUM CHLORIDE, SODIUM LACTATE, POTASSIUM CHLORIDE, CALCIUM CHLORIDE 600; 310; 30; 20 MG/100ML; MG/100ML; MG/100ML; MG/100ML
125 INJECTION, SOLUTION INTRAVENOUS CONTINUOUS
Status: CANCELLED | OUTPATIENT
Start: 2022-02-02

## 2022-02-02 RX ORDER — LIDOCAINE HYDROCHLORIDE 10 MG/ML
5 INJECTION, SOLUTION EPIDURAL; INFILTRATION; INTRACAUDAL; PERINEURAL AS NEEDED
Status: CANCELLED | OUTPATIENT
Start: 2022-02-02

## 2022-02-02 RX ORDER — SODIUM CHLORIDE 0.9 % (FLUSH) 0.9 %
3 SYRINGE (ML) INJECTION EVERY 12 HOURS SCHEDULED
Status: CANCELLED | OUTPATIENT
Start: 2022-02-02

## 2022-02-02 RX ORDER — METHYLERGONOVINE MALEATE 0.2 MG/ML
200 INJECTION INTRAVENOUS ONCE AS NEEDED
Status: CANCELLED | OUTPATIENT
Start: 2022-02-02

## 2022-02-02 RX ORDER — OXYTOCIN/0.9 % SODIUM CHLORIDE 30/500 ML
85 PLASTIC BAG, INJECTION (ML) INTRAVENOUS ONCE
Status: CANCELLED | OUTPATIENT
Start: 2022-02-02 | End: 2022-02-02

## 2022-02-02 RX ORDER — CARBOPROST TROMETHAMINE 250 UG/ML
250 INJECTION, SOLUTION INTRAMUSCULAR AS NEEDED
Status: CANCELLED | OUTPATIENT
Start: 2022-02-02

## 2022-02-02 RX ORDER — ACETAMINOPHEN 500 MG
1000 TABLET ORAL ONCE
Status: CANCELLED | OUTPATIENT
Start: 2022-02-02 | End: 2022-02-02

## 2022-02-02 RX ORDER — MISOPROSTOL 200 UG/1
800 TABLET ORAL AS NEEDED
Status: CANCELLED | OUTPATIENT
Start: 2022-02-02

## 2022-02-02 RX ORDER — CEFAZOLIN SODIUM IN 0.9 % NACL 3 G/100 ML
3 INTRAVENOUS SOLUTION, PIGGYBACK (ML) INTRAVENOUS ONCE
Status: CANCELLED | OUTPATIENT
Start: 2022-02-02 | End: 2022-02-02

## 2022-02-02 RX ORDER — OXYTOCIN/0.9 % SODIUM CHLORIDE 30/500 ML
650 PLASTIC BAG, INJECTION (ML) INTRAVENOUS ONCE
Status: CANCELLED | OUTPATIENT
Start: 2022-02-02 | End: 2022-02-02

## 2022-02-02 NOTE — PROGRESS NOTES
OB FOLLOW UP  CC- Here for care of pregnancy        Sabra Smart is a 26 y.o.  38w2d patient being seen today for her obstetrical follow up visit. Patient reports ctx for 2 days. She had COVID since her last visit which postponed her visit..     Her prenatal care is complicated by (and status) :    Patient Active Problem List   Diagnosis   • Vaginal delivery   • Shoulder dystocia during labor and delivery   • Obesity affecting pregnancy, antepartum   • History of ectopic pregnancy   • Pregnancy   • Rh negative, antepartum           GBS Status: Done Today  Her Delivery Plan is: Undecided. She is scheduled for a c/s on Monday, but she would really have the baby vaginally. There is concern about the previous shoulder dystocia with her last.  Ultrasound Today: Yes.  Findings showed EFW 79th%, AC 90th%, BPP 8/8, ESTRELLA 17.2.  I have personally evaluated the U/S and agree with the findings. Adwoa Lawrence MD    ROS -   Patient Reports : Contractions  Patient Denies: Loss of Fluid, Vaginal Spotting, Vision Changes and Headaches  Fetal Movement : normal  All other systems reviewed and are negative.       The additional following portions of the patient's history were reviewed and updated as appropriate: allergies, current medications, past family history, past medical history, past social history, past surgical history and problem list.    I have reviewed and agree with the HPI, ROS, and historical information as entered above. Adwoa Lawrence MD        /68   Wt 104 kg (230 lb)   LMP 05/10/2021 (Exact Date)   BMI 33.00 kg/m²     EXAM:     FHT: 148 BPM   Uterine Size: size greater than dates  Pelvic Exam: Yes Dilation: 1cm  Effacement: 50%  Station: -3    Urine glucose/protein: See prenatal flowsheet       Assessment and Plan    Problem List Items Addressed This Visit        Gravid and     Shoulder dystocia during labor and delivery    Overview     Discussed all options.  Patient  had a significant shoulder dystocia on first pregnancy.  Plan for now right  section at 39 weeks.  Primary  section scheduled 2022 at 7:30 AM  Now reconsidering trying for vaginal, U/S 34 weeks -5 pound 7 ounces which is 56th percentile.         Obesity affecting pregnancy, antepartum - Primary    Overview     BMI 44.9 at new OB visit.  We discussed the 10 to 15 pound weight gain during pregnancy.         Pregnancy    Overview     2019  at 39 weeks +2 days baby girl [place] weighing 8 pounds 1 ounce.  Second-degree laceration and shoulder dystocia  CF DNA negative girl!  Naming her Kelly Lutz         Relevant Orders    Strep B Screen - Swab, Vaginal/Rectum    Rh negative, antepartum    Overview     Status post RhoGam on 2021 for spotting               1. Pregnancy at 38w2d  2. Fetal status reassuring.   3. Activity and Exercise discussed.  No follow-ups on file.    Adwoa Lawrence MD  2022

## 2022-02-04 ENCOUNTER — APPOINTMENT (OUTPATIENT)
Dept: PREADMISSION TESTING | Facility: HOSPITAL | Age: 27
End: 2022-02-04

## 2022-02-04 LAB — BACTERIA SPEC AEROBE CULT: NORMAL

## 2022-02-07 ENCOUNTER — HOSPITAL ENCOUNTER (INPATIENT)
Facility: HOSPITAL | Age: 27
LOS: 3 days | Discharge: HOME | End: 2022-02-10
Attending: OBSTETRICS & GYNECOLOGY | Admitting: OBSTETRICS & GYNECOLOGY

## 2022-02-07 ENCOUNTER — ANESTHESIA EVENT (OUTPATIENT)
Dept: LABOR AND DELIVERY | Facility: HOSPITAL | Age: 27
End: 2022-02-07

## 2022-02-07 ENCOUNTER — ANESTHESIA (OUTPATIENT)
Dept: LABOR AND DELIVERY | Facility: HOSPITAL | Age: 27
End: 2022-02-07

## 2022-02-07 DIAGNOSIS — Z3A.39 39 WEEKS GESTATION OF PREGNANCY: ICD-10-CM

## 2022-02-07 LAB
ABO GROUP BLD: NORMAL
ABO GROUP BLD: NORMAL
AMPHET+METHAMPHET UR QL: NEGATIVE
AMPHETAMINES UR QL: NEGATIVE
BARBITURATES UR QL SCN: NEGATIVE
BENZODIAZ UR QL SCN: NEGATIVE
BLD GP AB SCN SERPL QL: POSITIVE
BUPRENORPHINE SERPL-MCNC: NEGATIVE NG/ML
CANNABINOIDS SERPL QL: NEGATIVE
COCAINE UR QL: NEGATIVE
DEPRECATED RDW RBC AUTO: 43.7 FL (ref 37–54)
ERYTHROCYTE [DISTWIDTH] IN BLOOD BY AUTOMATED COUNT: 14.2 % (ref 12.3–15.4)
FETAL BLEED: NEGATIVE
HCT VFR BLD AUTO: 36 % (ref 34–46.6)
HGB BLD-MCNC: 11.9 G/DL (ref 12–15.9)
MCH RBC QN AUTO: 28.5 PG (ref 26.6–33)
MCHC RBC AUTO-ENTMCNC: 33.1 G/DL (ref 31.5–35.7)
MCV RBC AUTO: 86.1 FL (ref 79–97)
METHADONE UR QL SCN: NEGATIVE
NUMBER OF DOSES: NORMAL
OPIATES UR QL: NEGATIVE
OXYCODONE UR QL SCN: NEGATIVE
PCP UR QL SCN: NEGATIVE
PLATELET # BLD AUTO: 191 10*3/MM3 (ref 140–450)
PMV BLD AUTO: 9.5 FL (ref 6–12)
PROPOXYPH UR QL: NEGATIVE
RBC # BLD AUTO: 4.18 10*6/MM3 (ref 3.77–5.28)
RESIDUAL RHIG DETECTED: NORMAL
RH BLD: NEGATIVE
RH BLD: NEGATIVE
T&S EXPIRATION DATE: NORMAL
TRICYCLICS UR QL SCN: NEGATIVE
WBC NRBC COR # BLD: 7.1 10*3/MM3 (ref 3.4–10.8)

## 2022-02-07 PROCEDURE — 86900 BLOOD TYPING SEROLOGIC ABO: CPT | Performed by: OBSTETRICS & GYNECOLOGY

## 2022-02-07 PROCEDURE — 86901 BLOOD TYPING SEROLOGIC RH(D): CPT | Performed by: OBSTETRICS & GYNECOLOGY

## 2022-02-07 PROCEDURE — 59025 FETAL NON-STRESS TEST: CPT

## 2022-02-07 PROCEDURE — 59510 CESAREAN DELIVERY: CPT | Performed by: OBSTETRICS & GYNECOLOGY

## 2022-02-07 PROCEDURE — 86870 RBC ANTIBODY IDENTIFICATION: CPT | Performed by: OBSTETRICS & GYNECOLOGY

## 2022-02-07 PROCEDURE — 25010000002 MIDAZOLAM PER 1 MG: Performed by: NURSE ANESTHETIST, CERTIFIED REGISTERED

## 2022-02-07 PROCEDURE — 80306 DRUG TEST PRSMV INSTRMNT: CPT | Performed by: OBSTETRICS & GYNECOLOGY

## 2022-02-07 PROCEDURE — 25010000002 FENTANYL CITRATE (PF) 50 MCG/ML SOLUTION: Performed by: NURSE ANESTHETIST, CERTIFIED REGISTERED

## 2022-02-07 PROCEDURE — C1765 ADHESION BARRIER: HCPCS | Performed by: OBSTETRICS & GYNECOLOGY

## 2022-02-07 PROCEDURE — 85461 HEMOGLOBIN FETAL: CPT | Performed by: OBSTETRICS & GYNECOLOGY

## 2022-02-07 PROCEDURE — 25010000002 METOCLOPRAMIDE PER 10 MG: Performed by: NURSE ANESTHETIST, CERTIFIED REGISTERED

## 2022-02-07 PROCEDURE — 86850 RBC ANTIBODY SCREEN: CPT | Performed by: OBSTETRICS & GYNECOLOGY

## 2022-02-07 PROCEDURE — 25010000002 KETOROLAC TROMETHAMINE PER 15 MG: Performed by: OBSTETRICS & GYNECOLOGY

## 2022-02-07 PROCEDURE — 25010000002 RHO D IMMUNE GLOBULIN 1500 UNIT/2ML SOLUTION PREFILLED SYRINGE: Performed by: OBSTETRICS & GYNECOLOGY

## 2022-02-07 PROCEDURE — 0 MORPHINE PER 10 MG: Performed by: NURSE ANESTHETIST, CERTIFIED REGISTERED

## 2022-02-07 PROCEDURE — 85027 COMPLETE CBC AUTOMATED: CPT | Performed by: OBSTETRICS & GYNECOLOGY

## 2022-02-07 PROCEDURE — 25010000002 ONDANSETRON PER 1 MG: Performed by: NURSE ANESTHETIST, CERTIFIED REGISTERED

## 2022-02-07 PROCEDURE — 25010000002 METHYLERGONOVINE MALEATE PER 0.2 MG: Performed by: OBSTETRICS & GYNECOLOGY

## 2022-02-07 DEVICE — ABSORBABLE ADHESION BARRIER
Type: IMPLANTABLE DEVICE | Site: UTERUS | Status: FUNCTIONAL
Brand: GYNECARE INTERCEED

## 2022-02-07 RX ORDER — CALCIUM CARBONATE 200(500)MG
1 TABLET,CHEWABLE ORAL EVERY 4 HOURS PRN
Status: DISCONTINUED | OUTPATIENT
Start: 2022-02-07 | End: 2022-02-10 | Stop reason: HOSPADM

## 2022-02-07 RX ORDER — ACETAMINOPHEN 500 MG
1000 TABLET ORAL EVERY 6 HOURS
Status: COMPLETED | OUTPATIENT
Start: 2022-02-07 | End: 2022-02-08

## 2022-02-07 RX ORDER — DIPHENHYDRAMINE HCL 25 MG
25 CAPSULE ORAL EVERY 4 HOURS PRN
Status: DISCONTINUED | OUTPATIENT
Start: 2022-02-07 | End: 2022-02-10 | Stop reason: HOSPADM

## 2022-02-07 RX ORDER — PRENATAL VIT/IRON FUM/FOLIC AC 27MG-0.8MG
1 TABLET ORAL DAILY
Status: DISCONTINUED | OUTPATIENT
Start: 2022-02-07 | End: 2022-02-10 | Stop reason: HOSPADM

## 2022-02-07 RX ORDER — SODIUM CHLORIDE 0.9 % (FLUSH) 0.9 %
1-10 SYRINGE (ML) INJECTION AS NEEDED
Status: DISCONTINUED | OUTPATIENT
Start: 2022-02-07 | End: 2022-02-10 | Stop reason: HOSPADM

## 2022-02-07 RX ORDER — PROMETHAZINE HYDROCHLORIDE 25 MG/1
25 TABLET ORAL EVERY 6 HOURS PRN
Status: DISCONTINUED | OUTPATIENT
Start: 2022-02-07 | End: 2022-02-10 | Stop reason: HOSPADM

## 2022-02-07 RX ORDER — TRISODIUM CITRATE DIHYDRATE AND CITRIC ACID MONOHYDRATE 500; 334 MG/5ML; MG/5ML
30 SOLUTION ORAL ONCE
Status: COMPLETED | OUTPATIENT
Start: 2022-02-07 | End: 2022-02-07

## 2022-02-07 RX ORDER — BUPIVACAINE HYDROCHLORIDE 7.5 MG/ML
INJECTION, SOLUTION INTRASPINAL AS NEEDED
Status: DISCONTINUED | OUTPATIENT
Start: 2022-02-07 | End: 2022-02-07 | Stop reason: SURG

## 2022-02-07 RX ORDER — ONDANSETRON 2 MG/ML
4 INJECTION INTRAMUSCULAR; INTRAVENOUS ONCE AS NEEDED
Status: DISCONTINUED | OUTPATIENT
Start: 2022-02-07 | End: 2022-02-07 | Stop reason: HOSPADM

## 2022-02-07 RX ORDER — EPHEDRINE SULFATE 50 MG/ML
INJECTION, SOLUTION INTRAVENOUS AS NEEDED
Status: DISCONTINUED | OUTPATIENT
Start: 2022-02-07 | End: 2022-02-07 | Stop reason: SURG

## 2022-02-07 RX ORDER — DIPHENHYDRAMINE HYDROCHLORIDE 50 MG/ML
25 INJECTION INTRAMUSCULAR; INTRAVENOUS EVERY 4 HOURS PRN
Status: DISCONTINUED | OUTPATIENT
Start: 2022-02-07 | End: 2022-02-10 | Stop reason: HOSPADM

## 2022-02-07 RX ORDER — SODIUM CHLORIDE, SODIUM LACTATE, POTASSIUM CHLORIDE, CALCIUM CHLORIDE 600; 310; 30; 20 MG/100ML; MG/100ML; MG/100ML; MG/100ML
125 INJECTION, SOLUTION INTRAVENOUS CONTINUOUS
Status: DISCONTINUED | OUTPATIENT
Start: 2022-02-07 | End: 2022-02-07

## 2022-02-07 RX ORDER — PROMETHAZINE HYDROCHLORIDE 12.5 MG/1
12.5 SUPPOSITORY RECTAL EVERY 6 HOURS PRN
Status: DISCONTINUED | OUTPATIENT
Start: 2022-02-07 | End: 2022-02-10 | Stop reason: HOSPADM

## 2022-02-07 RX ORDER — OXYTOCIN/0.9 % SODIUM CHLORIDE 30/500 ML
650 PLASTIC BAG, INJECTION (ML) INTRAVENOUS ONCE
Status: DISCONTINUED | OUTPATIENT
Start: 2022-02-07 | End: 2022-02-07 | Stop reason: HOSPADM

## 2022-02-07 RX ORDER — ACETAMINOPHEN 500 MG
1000 TABLET ORAL ONCE
Status: COMPLETED | OUTPATIENT
Start: 2022-02-07 | End: 2022-02-07

## 2022-02-07 RX ORDER — SODIUM CHLORIDE 0.9 % (FLUSH) 0.9 %
10 SYRINGE (ML) INJECTION AS NEEDED
Status: DISCONTINUED | OUTPATIENT
Start: 2022-02-07 | End: 2022-02-07

## 2022-02-07 RX ORDER — OXYCODONE HYDROCHLORIDE 5 MG/1
5 TABLET ORAL EVERY 4 HOURS PRN
Status: DISCONTINUED | OUTPATIENT
Start: 2022-02-07 | End: 2022-02-10 | Stop reason: HOSPADM

## 2022-02-07 RX ORDER — METHYLERGONOVINE MALEATE 0.2 MG/ML
200 INJECTION INTRAVENOUS AS NEEDED
Status: DISCONTINUED | OUTPATIENT
Start: 2022-02-07 | End: 2022-02-10 | Stop reason: HOSPADM

## 2022-02-07 RX ORDER — OXYCODONE HYDROCHLORIDE 5 MG/1
10 TABLET ORAL EVERY 4 HOURS PRN
Status: DISCONTINUED | OUTPATIENT
Start: 2022-02-07 | End: 2022-02-10 | Stop reason: HOSPADM

## 2022-02-07 RX ORDER — KETOROLAC TROMETHAMINE 30 MG/ML
30 INJECTION, SOLUTION INTRAMUSCULAR; INTRAVENOUS ONCE
Status: COMPLETED | OUTPATIENT
Start: 2022-02-07 | End: 2022-02-07

## 2022-02-07 RX ORDER — ACETAMINOPHEN 325 MG/1
650 TABLET ORAL EVERY 6 HOURS
Status: DISCONTINUED | OUTPATIENT
Start: 2022-02-08 | End: 2022-02-10 | Stop reason: HOSPADM

## 2022-02-07 RX ORDER — ASPIRIN 81 MG/1
81 TABLET, CHEWABLE ORAL DAILY
COMMUNITY
End: 2022-02-10 | Stop reason: HOSPADM

## 2022-02-07 RX ORDER — CARBOPROST TROMETHAMINE 250 UG/ML
250 INJECTION, SOLUTION INTRAMUSCULAR AS NEEDED
Status: DISCONTINUED | OUTPATIENT
Start: 2022-02-07 | End: 2022-02-07 | Stop reason: HOSPADM

## 2022-02-07 RX ORDER — MORPHINE SULFATE 0.5 MG/ML
INJECTION, SOLUTION EPIDURAL; INTRATHECAL; INTRAVENOUS AS NEEDED
Status: DISCONTINUED | OUTPATIENT
Start: 2022-02-07 | End: 2022-02-07 | Stop reason: SURG

## 2022-02-07 RX ORDER — IBUPROFEN 600 MG/1
600 TABLET ORAL EVERY 6 HOURS
Status: DISCONTINUED | OUTPATIENT
Start: 2022-02-08 | End: 2022-02-10 | Stop reason: HOSPADM

## 2022-02-07 RX ORDER — CARBOPROST TROMETHAMINE 250 UG/ML
250 INJECTION, SOLUTION INTRAMUSCULAR AS NEEDED
Status: DISCONTINUED | OUTPATIENT
Start: 2022-02-07 | End: 2022-02-10 | Stop reason: HOSPADM

## 2022-02-07 RX ORDER — FENTANYL CITRATE 50 UG/ML
50 INJECTION, SOLUTION INTRAMUSCULAR; INTRAVENOUS
Status: DISCONTINUED | OUTPATIENT
Start: 2022-02-07 | End: 2022-02-07 | Stop reason: HOSPADM

## 2022-02-07 RX ORDER — ONDANSETRON 4 MG/1
4 TABLET, FILM COATED ORAL EVERY 8 HOURS PRN
Status: DISCONTINUED | OUTPATIENT
Start: 2022-02-07 | End: 2022-02-10 | Stop reason: HOSPADM

## 2022-02-07 RX ORDER — OXYTOCIN-SODIUM CHLORIDE 0.9% IV SOLN 30 UNIT/500ML 30-0.9/5 UT/ML-%
SOLUTION INTRAVENOUS CONTINUOUS PRN
Status: DISCONTINUED | OUTPATIENT
Start: 2022-02-07 | End: 2022-02-07 | Stop reason: SURG

## 2022-02-07 RX ORDER — KETOROLAC TROMETHAMINE 15 MG/ML
15 INJECTION, SOLUTION INTRAMUSCULAR; INTRAVENOUS EVERY 6 HOURS
Status: COMPLETED | OUTPATIENT
Start: 2022-02-07 | End: 2022-02-08

## 2022-02-07 RX ORDER — MISOPROSTOL 200 UG/1
800 TABLET ORAL AS NEEDED
Status: DISCONTINUED | OUTPATIENT
Start: 2022-02-07 | End: 2022-02-07 | Stop reason: HOSPADM

## 2022-02-07 RX ORDER — SODIUM CHLORIDE 0.9 % (FLUSH) 0.9 %
3 SYRINGE (ML) INJECTION EVERY 12 HOURS SCHEDULED
Status: DISCONTINUED | OUTPATIENT
Start: 2022-02-07 | End: 2022-02-10 | Stop reason: HOSPADM

## 2022-02-07 RX ORDER — METOCLOPRAMIDE HYDROCHLORIDE 5 MG/ML
INJECTION INTRAMUSCULAR; INTRAVENOUS AS NEEDED
Status: DISCONTINUED | OUTPATIENT
Start: 2022-02-07 | End: 2022-02-07 | Stop reason: SURG

## 2022-02-07 RX ORDER — METHYLERGONOVINE MALEATE 0.2 MG/ML
200 INJECTION INTRAVENOUS ONCE AS NEEDED
Status: COMPLETED | OUTPATIENT
Start: 2022-02-07 | End: 2022-02-07

## 2022-02-07 RX ORDER — ONDANSETRON 2 MG/ML
INJECTION INTRAMUSCULAR; INTRAVENOUS AS NEEDED
Status: DISCONTINUED | OUTPATIENT
Start: 2022-02-07 | End: 2022-02-07 | Stop reason: SURG

## 2022-02-07 RX ORDER — MIDAZOLAM HYDROCHLORIDE 1 MG/ML
INJECTION INTRAMUSCULAR; INTRAVENOUS AS NEEDED
Status: DISCONTINUED | OUTPATIENT
Start: 2022-02-07 | End: 2022-02-07 | Stop reason: SURG

## 2022-02-07 RX ORDER — DOCUSATE SODIUM 100 MG/1
100 CAPSULE, LIQUID FILLED ORAL 2 TIMES DAILY PRN
Status: DISCONTINUED | OUTPATIENT
Start: 2022-02-07 | End: 2022-02-10 | Stop reason: HOSPADM

## 2022-02-07 RX ORDER — SODIUM CHLORIDE 0.9 % (FLUSH) 0.9 %
3 SYRINGE (ML) INJECTION EVERY 12 HOURS SCHEDULED
Status: DISCONTINUED | OUTPATIENT
Start: 2022-02-07 | End: 2022-02-07

## 2022-02-07 RX ORDER — FENTANYL CITRATE 50 UG/ML
INJECTION, SOLUTION INTRAMUSCULAR; INTRAVENOUS AS NEEDED
Status: DISCONTINUED | OUTPATIENT
Start: 2022-02-07 | End: 2022-02-07 | Stop reason: SURG

## 2022-02-07 RX ORDER — SIMETHICONE 80 MG
80 TABLET,CHEWABLE ORAL 4 TIMES DAILY PRN
Status: DISCONTINUED | OUTPATIENT
Start: 2022-02-07 | End: 2022-02-10 | Stop reason: HOSPADM

## 2022-02-07 RX ORDER — CEFAZOLIN SODIUM IN 0.9 % NACL 3 G/100 ML
3 INTRAVENOUS SOLUTION, PIGGYBACK (ML) INTRAVENOUS ONCE
Status: COMPLETED | OUTPATIENT
Start: 2022-02-07 | End: 2022-02-07

## 2022-02-07 RX ORDER — HYDROCORTISONE 25 MG/G
1 CREAM TOPICAL AS NEEDED
Status: DISCONTINUED | OUTPATIENT
Start: 2022-02-07 | End: 2022-02-10 | Stop reason: HOSPADM

## 2022-02-07 RX ORDER — NALOXONE HCL 0.4 MG/ML
0.4 VIAL (ML) INJECTION
Status: ACTIVE | OUTPATIENT
Start: 2022-02-07 | End: 2022-02-08

## 2022-02-07 RX ORDER — OXYTOCIN/0.9 % SODIUM CHLORIDE 30/500 ML
85 PLASTIC BAG, INJECTION (ML) INTRAVENOUS ONCE
Status: DISCONTINUED | OUTPATIENT
Start: 2022-02-07 | End: 2022-02-07 | Stop reason: HOSPADM

## 2022-02-07 RX ORDER — ALUMINA, MAGNESIA, AND SIMETHICONE 2400; 2400; 240 MG/30ML; MG/30ML; MG/30ML
15 SUSPENSION ORAL EVERY 4 HOURS PRN
Status: DISCONTINUED | OUTPATIENT
Start: 2022-02-07 | End: 2022-02-10 | Stop reason: HOSPADM

## 2022-02-07 RX ORDER — FAMOTIDINE 10 MG/ML
INJECTION, SOLUTION INTRAVENOUS AS NEEDED
Status: DISCONTINUED | OUTPATIENT
Start: 2022-02-07 | End: 2022-02-07 | Stop reason: SURG

## 2022-02-07 RX ORDER — LIDOCAINE HYDROCHLORIDE 10 MG/ML
5 INJECTION, SOLUTION EPIDURAL; INFILTRATION; INTRACAUDAL; PERINEURAL AS NEEDED
Status: DISCONTINUED | OUTPATIENT
Start: 2022-02-07 | End: 2022-02-07

## 2022-02-07 RX ADMIN — SODIUM CHLORIDE, POTASSIUM CHLORIDE, SODIUM LACTATE AND CALCIUM CHLORIDE 2000 ML/HR: 600; 310; 30; 20 INJECTION, SOLUTION INTRAVENOUS at 07:25

## 2022-02-07 RX ADMIN — ACETAMINOPHEN 1000 MG: 500 TABLET ORAL at 20:10

## 2022-02-07 RX ADMIN — MIDAZOLAM 1 MG: 1 INJECTION INTRAMUSCULAR; INTRAVENOUS at 08:13

## 2022-02-07 RX ADMIN — ONDANSETRON 4 MG: 2 INJECTION INTRAMUSCULAR; INTRAVENOUS at 07:41

## 2022-02-07 RX ADMIN — METHYLERGONOVINE MALEATE 200 MCG: 0.2 INJECTION, SOLUTION INTRAMUSCULAR; INTRAVENOUS at 09:35

## 2022-02-07 RX ADMIN — SODIUM CHLORIDE, POTASSIUM CHLORIDE, SODIUM LACTATE AND CALCIUM CHLORIDE: 600; 310; 30; 20 INJECTION, SOLUTION INTRAVENOUS at 08:04

## 2022-02-07 RX ADMIN — FENTANYL CITRATE 20 MCG: 50 INJECTION, SOLUTION INTRAMUSCULAR; INTRAVENOUS at 07:52

## 2022-02-07 RX ADMIN — ACETAMINOPHEN 1000 MG: 500 TABLET ORAL at 07:17

## 2022-02-07 RX ADMIN — OXYTOCIN-SODIUM CHLORIDE 0.9% IV SOLN 30 UNIT/500ML 1000 ML/HR: 30-0.9/5 SOLUTION at 08:11

## 2022-02-07 RX ADMIN — ACETAMINOPHEN 1000 MG: 500 TABLET ORAL at 13:12

## 2022-02-07 RX ADMIN — SIMETHICONE 80 MG: 80 TABLET, CHEWABLE ORAL at 20:10

## 2022-02-07 RX ADMIN — BUPIVACAINE HYDROCHLORIDE IN DEXTROSE 1.9 ML: 7.5 INJECTION, SOLUTION SUBARACHNOID at 07:52

## 2022-02-07 RX ADMIN — MORPHINE SULFATE 0.15 MG: 0.5 INJECTION, SOLUTION EPIDURAL; INTRATHECAL; INTRAVENOUS at 07:52

## 2022-02-07 RX ADMIN — HUMAN RHO(D) IMMUNE GLOBULIN 1500 UNITS: 1500 SOLUTION INTRAMUSCULAR; INTRAVENOUS at 23:28

## 2022-02-07 RX ADMIN — SODIUM CHLORIDE, POTASSIUM CHLORIDE, SODIUM LACTATE AND CALCIUM CHLORIDE 1000 ML: 600; 310; 30; 20 INJECTION, SOLUTION INTRAVENOUS at 06:45

## 2022-02-07 RX ADMIN — OXYTOCIN-SODIUM CHLORIDE 0.9% IV SOLN 30 UNIT/500ML 1000 ML/HR: 30-0.9/5 SOLUTION at 08:38

## 2022-02-07 RX ADMIN — MIDAZOLAM 1 MG: 1 INJECTION INTRAMUSCULAR; INTRAVENOUS at 07:41

## 2022-02-07 RX ADMIN — KETOROLAC TROMETHAMINE 15 MG: 15 INJECTION, SOLUTION INTRAMUSCULAR; INTRAVENOUS at 23:28

## 2022-02-07 RX ADMIN — KETOROLAC TROMETHAMINE 15 MG: 15 INJECTION, SOLUTION INTRAMUSCULAR; INTRAVENOUS at 17:57

## 2022-02-07 RX ADMIN — METOCLOPRAMIDE 10 MG: 5 INJECTION, SOLUTION INTRAMUSCULAR; INTRAVENOUS at 07:41

## 2022-02-07 RX ADMIN — Medication 3 G: at 07:28

## 2022-02-07 RX ADMIN — FAMOTIDINE 20 MG: 10 INJECTION, SOLUTION INTRAVENOUS at 07:41

## 2022-02-07 RX ADMIN — SODIUM CITRATE AND CITRIC ACID MONOHYDRATE 30 ML: 500; 334 SOLUTION ORAL at 07:36

## 2022-02-07 RX ADMIN — SODIUM CHLORIDE, POTASSIUM CHLORIDE, SODIUM LACTATE AND CALCIUM CHLORIDE 125 ML/HR: 600; 310; 30; 20 INJECTION, SOLUTION INTRAVENOUS at 08:52

## 2022-02-07 RX ADMIN — DOCUSATE SODIUM 100 MG: 100 CAPSULE, LIQUID FILLED ORAL at 20:10

## 2022-02-07 RX ADMIN — EPHEDRINE SULFATE 7.5 MG: 50 INJECTION, SOLUTION INTRAVENOUS at 08:04

## 2022-02-07 RX ADMIN — KETOROLAC TROMETHAMINE 30 MG: 30 INJECTION, SOLUTION INTRAMUSCULAR at 09:29

## 2022-02-07 NOTE — OP NOTE
[facility]   Section Operative Note    Pre-Operative Dx:   1.  IUP at 39w0d  weeks     2. History of shoulder dystocia        Postoperative dx:    1.  Same     Procedure: Procedure(s):   SECTION PRIMARY history of shoulder dystocia   Surgeon: Adwao Lawrence MD    Assistant: Surgeon(s):  Adwoa Lawrence MD        Anesthesia: Spinal    EBL:   mls.  548 mls.         IV Fluids: 2100 mls.   UOP: 75 mls.       Antibiotics: cefazolin (Ancef)     Infant:            Gender: female  infant    Weight: 3798 g (8 lb 6 oz)     Apgars: 7  @ 1 minute /     8  @ 5 minutes    Fetal presentation: cephalic   Amniotic fluid: Clear      Complications:   None      Disposition:   Mother to Mother Baby/Postpartum  in stable condition currently.   Baby to NICU -transition in stable condition currently.       Procedure:   The patient was taken to the operating room where spinal anesthesia was placed, and she was placed in supine position with a leftward tilt. Sequential compression devices on bilateral lower extremities and a Pham catheter placed in her bladder. After being prepped and draped in a normal sterile fashion, a Pfannenstiel skin incision was made with a scalpel and taken down to the level of the fascia using the Bovie. The fascia was incised in the midline and extended laterally. The superior edge of the fascia was grasped with Kocher clamps, elevated and the rectus muscle dissected off. In a similar fashion, the inferior edge of the fascia was grasped with Kocher clamps, elevated and the rectus muscles dissected off. The rectus muscles were bisected in the midline. The peritoneum was identified, grasped and entered into sharply using Metzenbaum scissors. This incision was extended superiorly and inferiorly with good visualization of the bladder. Bladder blade was placed. A bladder flap was created. A low transverse uterine incision was made with a scalpel and extended laterally. Amniotomy with  clear fluid occurred at the time of hysterotomy. The head was brought to the uterine incision, and using fundal pressure, the head delivered without complication. Nose and mouth were bulb suctioned.  Shoulders and body were to follow. Cord was milked x4, clamped x2 and cut. Infant was handed to the awaiting pediatric team.  Cord blood was obtained. The placenta was manually extracted. The uterus was exteriorized and cleared of all clot and debris and the hysterotomy site was closed with a 1-0 chromic in a running locked fashion starting at the left angle and moving towards the right. Copious irrigation behind the uterus ensued. The uterus was returned to the abdominal cavity. Paracolic gutters were cleared of all clot and debris. The hysterotomy site was noted to be hemostatic as well as the bladder flap and Interceed was placed over this. The peritoneum was reapproximated using a 2-0 chromic in a running fashion starting superiorly and moving inferiorly. Irrigation over the rectus muscles then occurred with Bovie cauterization of any bleeding sites. The fascia was reapproximated using a looped 0 PDS in a running fashion starting at the left angle and moving towards the right.  The subcutaneous fat layer was hemostatic and closed in an interrupted fashion with 2-0 Plain.  The skin was reapproximated with a 4-0 vicryl on a Abraham needle. All sponge, lap, and needle counts were correct x2. The patient was taken to the recovery room in stable condition.             Adwoa Lawrence MD  2/7/2022  09:15 EST

## 2022-02-07 NOTE — PLAN OF CARE
Problem: Bleeding ( Delivery)  Goal: Bleeding is Controlled  Outcome: Met     Problem: Respiratory Compromise ( Delivery)  Goal: Effective Oxygenation and Ventilation  Outcome: Met     Problem: Infection ( Delivery)  Goal: Absence of Infection Signs and Symptoms  Outcome: Adequate for Care Transition   Goal Outcome Evaluation:

## 2022-02-07 NOTE — ANESTHESIA PREPROCEDURE EVALUATION
Anesthesia Evaluation     Patient summary reviewed and Nursing notes reviewed   NPO Solid Status: > 8 hours  NPO Liquid Status: > 8 hours           Airway   Dental      Pulmonary - negative pulmonary ROS   Cardiovascular - negative cardio ROS        Neuro/Psych- negative ROS  GI/Hepatic/Renal/Endo    (+) obesity,       Musculoskeletal (-) negative ROS    Abdominal    Substance History - negative use     OB/GYN    (+) Pregnant,         Other                        Anesthesia Plan    ASA 2     spinal and ITN       Anesthetic plan, all risks, benefits, and alternatives have been provided, discussed and informed consent has been obtained with: patient.        CODE STATUS:

## 2022-02-07 NOTE — LACTATION NOTE
Baby is too sleepy to nurse or suck on anything at this time.  Mom was encouraged to hold her skin-to-skin for 10 to 15 minutes and pump if she doesn't show feeding cues soon.

## 2022-02-07 NOTE — LACTATION NOTE
Baby is currently in NICU observation.  A hospital pump was set up for Mom's use, and she was advised to pump if baby isn't able to go to the breast by 1400.  Mom and Dad were shown how to use the pump and given sterile syringes for milk storage.  Mom said she pumped for her first child for a year.   02/07/22 1300   Maternal Information   Infant Reason for Referral other (see comments)  (Infant is in NICU observation)   Maternal Assessment   Breast Size Issue none   Breast Shape Bilateral:; round   Breast Density Bilateral:; soft   Areola other (see comments)  (prominent Hester glands)   Nipples Bilateral:; everted; other (see comments)  (Nipples have an irregular surface.  Looks like she has Hester glands on the tips of her nipples.)   Left Nipple Symptoms intact   Right Nipple Symptoms intact   Maternal Infant Feeding   Maternal Emotional State anxious; receptive   Support Person Involvement actively supporting mother   Milk Expression/Equipment   Breast Pump Type double electric, personal; double electric, hospital grade  (Has Spectra pump at home.  Hospital pump set up for her use.)   Breast Pump Flange Size 27 mm   Breast Pumping   Breast Pumping Interventions other (see comments)  (Mom was encouraged to pump if she isn't able to put baby to the breast by 1400.)   Breast Pumping other (see comments)  (Mom was shown how to use the hospital pump.)

## 2022-02-07 NOTE — H&P
History and Physical:    Subjective     Chief Complaint   Patient presents with   • Scheduled        Sabra Smart is a 26 y.o. year old  with an Estimated Date of Delivery: 22 currently at 39w0d presenting with Out right  section for history of shoulder dystocia.    Prenatal care has been with Adwoa Lawrence MD.  It has been significant for Obesity, history of shoulder dystocia.        Review of Systems  Pertinent items are noted in HPI.     Past Medical History:   Diagnosis Date   • Ectopic pregnancy    • History of COVID-19 2022   • Morbid obesity with BMI of 40.0-44.9, adult (Formerly [facility])    •  (spontaneous vaginal delivery)      Past Surgical History:   Procedure Laterality Date   • EAR TUBES     • KNEE ARTHROSCOPY  2020   • MOUTH SURGERY     • TONSILLECTOMY     • WISDOM TOOTH EXTRACTION       Family History   Problem Relation Age of Onset   • No Known Problems Father    • Thyroid disease Mother    • No Known Problems Brother    • No Known Problems Sister    • No Known Problems Son    • No Known Problems Daughter    • No Known Problems Paternal Grandfather    • No Known Problems Maternal Grandmother    • No Known Problems Maternal Grandfather    • No Known Problems Maternal Aunt    • No Known Problems Maternal Uncle    • No Known Problems Paternal Aunt    • No Known Problems Paternal Uncle    • Hypertension Other    • Ovarian cancer Other      Social History     Tobacco Use   • Smoking status: Never Smoker   • Smokeless tobacco: Never Used   Substance Use Topics   • Alcohol use: No   • Drug use: No     Medications Prior to Admission   Medication Sig Dispense Refill Last Dose   • aspirin 81 MG chewable tablet Chew 81 mg Daily.   Past Week at Unknown time   • Prenatal Vit-Fe Fumarate-FA (PRENATAL 27-) 27-1 MG tablet tablet Take 1 tablet by mouth Daily.   2022 at Unknown time     Allergies:  Patient has no known allergies.  OB History    Para Term   "AB Living   3 1 1 0 1 1   SAB IAB Ectopic Molar Multiple Live Births   0 0 1 0 0 1      # Outcome Date GA Lbr Carl/2nd Weight Sex Delivery Anes PTL Lv   3 Current            2 Ectopic 07/01/20           1 Term 01/28/19 39w2d 11:05 / 02:50 3655 g (8 lb 0.9 oz) F Vag-Spont EPI N CARLY      Complications: Shoulder Dystocia         Objective     /87 (BP Location: Right arm, Patient Position: Lying)   Pulse 95   Temp 98.5 °F (36.9 °C) (Oral)   Resp 16   Ht 177.8 cm (70\")   Wt 104 kg (230 lb)   LMP 05/10/2021 (Exact Date)   Breastfeeding Yes   BMI 33.00 kg/m²     Physical Exam    General:  No acute distress           Abdomen: Gravid, nontender       FHT's: reactive    Cervix:  Deferred   [place]: Contraction are irregular     Lab Review   External Prenatal Results     Pregnancy Outside Results - Transcribed From Office Records - See Scanned Records For Details     Test Value Date Time    ABO  A  06/29/21 0930    Rh  Negative  06/29/21 0930    Antibody Screen  Negative  11/24/21 1139       Positive  06/29/21 0930       See Final Results  06/29/21 0930    Varicella IgG       Rubella  2.44 index 06/29/21 0930    Hgb  11.9 g/dL 02/07/22 0650       11.2 g/dL 11/24/21 1139       13.1 g/dL 06/29/21 0930    Hct  36.0 % 02/07/22 0650       32.5 % 11/24/21 1139       40.1 % 06/29/21 0930    Glucose Fasting GTT ^ 72 mg/dL 11/20/18     Glucose Tolerance Test 1 hour ^ 135  11/13/18     Glucose Tolerance Test 3 hour ^ 96  11/20/18     Gonorrhea (discrete)  Negative  06/29/21 0930    Chlamydia (discrete)  Negative  06/29/21 0930    RPR  Non Reactive  06/29/21 0930    VDRL       Syphilis Antibody       HBsAg  Negative  06/29/21 0930    Herpes Simplex Virus PCR       Herpes Simplex VIrus Culture       HIV  Non Reactive  06/29/21 0930    Hep C RNA Quant PCR       Hep C Antibody  <0.1 s/co ratio 06/29/21 0930    AFP  26.0 ng/mL 08/31/21 0926    Group B Strep  No Group B Streptococcus Isolated at 2 days  02/02/22 1731    GBS " Susceptibility to Clindamycin       GBS Susceptibility to Erythromycin       Fetal Fibronectin       Genetic Testing, Maternal Blood             Drug Screening     Test Value Date Time    Urine Drug Screen       Amphetamine Screen  Negative  22 0617    Barbiturate Screen  Negative  22 0617    Benzodiazepine Screen  Negative  22 0617    Methadone Screen  Negative  22 0617    Phencyclidine Screen  Negative  22 0617    Opiates Screen  Negative  22 0617    THC Screen  Negative  22 0617    Cocaine Screen       Propoxyphene Screen  Negative  22 0617    Buprenorphine Screen  Negative  22 0617    Methamphetamine Screen       Oxycodone Screen  Negative  22 0617    Tricyclic Antidepressants Screen  Negative  22 0617          Legend    ^: Historical                        Patient Active Problem List    Diagnosis Date Noted   • Rh negative, antepartum 2021     Note Last Updated: 2021     Status post RhoGam on 2021 for spotting     • Pregnancy 2021     Note Last Updated: 2021  at 39 weeks +2 days baby girl Nay weighing 8 pounds 1 ounce.  Second-degree laceration and shoulder dystocia  CF DNA negative girl!  Naming her Kelly Lutz     • Obesity affecting pregnancy, antepartum 2020     Note Last Updated: 2021     BMI 44.9 at new OB visit.  We discussed the 10 to 15 pound weight gain during pregnancy.     • History of ectopic pregnancy 2020     Note Last Updated: 2020     Status post methotrexate administration for ectopic on 2020.  Blood type A-     • Vaginal delivery 2019   • Shoulder dystocia during labor and delivery 2019     Note Last Updated: 2022     Discussed all options.  Patient had a significant shoulder dystocia on first pregnancy.  Plan for now right  section at 39 weeks.  Primary  section scheduled 2022 at 7:30 AM  Now reconsidering trying for  vaginal, U/S 34 weeks -5 pound 7 ounces which is 56th percentile.  At 38 weeks, baby measured 8 pounds 2 ounces which is 79th percentile.  AC was 90th percentile.  Patient would like to proceed with out right  section.          Assessment/Plan     ASSESSMENT  1. IUP at 39w0d  2. scheduled  section   3. GBBSnegative    4.  History of shoulder dystocia-baby measuring larger than last pregnancy.  Patient elects for our right  section    PLAN  1. Admit to labor and delivery   2.  Proceed with  section         Adwoa Lawrence MD  2022@

## 2022-02-07 NOTE — ANESTHESIA POSTPROCEDURE EVALUATION
Patient: Sabra Smart    Procedure Summary     Date: 22 Room / Location: [facility] LABOR DELIVERY   [facility] LABOR DELIVERY    Anesthesia Start: 738 Anesthesia Stop: 844    Procedure:  SECTION PRIMARY history of shoulder dystocia (N/A Abdomen) Diagnosis:     Surgeons: Adwoa Lawrence MD Provider: Wen Barrios DO    Anesthesia Type: spinal, ITN ASA Status: 2          Anesthesia Type: spinal, ITN    Vitals  Vitals Value Taken Time   /63 22 0842   Temp 97.8 °F (36.6 °C) 22 0841   Pulse 82 22 0846   Resp 16 22 0841   SpO2 99 % 22 0846   Vitals shown include unvalidated device data.        Post Anesthesia Care and Evaluation    Patient location during evaluation: bedside  Patient participation: complete - patient participated  Level of consciousness: awake and alert  Pain score: 0  Pain management: adequate  Airway patency: patent  Anesthetic complications: No anesthetic complications    Cardiovascular status: acceptable  Respiratory status: acceptable  Hydration status: acceptable

## 2022-02-08 LAB
BASOPHILS # BLD AUTO: 0.01 10*3/MM3 (ref 0–0.2)
BASOPHILS NFR BLD AUTO: 0.2 % (ref 0–1.5)
DEPRECATED RDW RBC AUTO: 43.8 FL (ref 37–54)
EOSINOPHIL # BLD AUTO: 0.04 10*3/MM3 (ref 0–0.4)
EOSINOPHIL NFR BLD AUTO: 0.6 % (ref 0.3–6.2)
ERYTHROCYTE [DISTWIDTH] IN BLOOD BY AUTOMATED COUNT: 14.4 % (ref 12.3–15.4)
HCT VFR BLD AUTO: 29 % (ref 34–46.6)
HGB BLD-MCNC: 9.5 G/DL (ref 12–15.9)
IMM GRANULOCYTES # BLD AUTO: 0.03 10*3/MM3 (ref 0–0.05)
IMM GRANULOCYTES NFR BLD AUTO: 0.5 % (ref 0–0.5)
LYMPHOCYTES # BLD AUTO: 0.83 10*3/MM3 (ref 0.7–3.1)
LYMPHOCYTES NFR BLD AUTO: 12.6 % (ref 19.6–45.3)
MCH RBC QN AUTO: 28.2 PG (ref 26.6–33)
MCHC RBC AUTO-ENTMCNC: 32.8 G/DL (ref 31.5–35.7)
MCV RBC AUTO: 86.1 FL (ref 79–97)
MONOCYTES # BLD AUTO: 0.51 10*3/MM3 (ref 0.1–0.9)
MONOCYTES NFR BLD AUTO: 7.8 % (ref 5–12)
NEUTROPHILS NFR BLD AUTO: 5.15 10*3/MM3 (ref 1.7–7)
NEUTROPHILS NFR BLD AUTO: 78.3 % (ref 42.7–76)
NRBC BLD AUTO-RTO: 0 /100 WBC (ref 0–0.2)
PLATELET # BLD AUTO: 146 10*3/MM3 (ref 140–450)
PMV BLD AUTO: 9.7 FL (ref 6–12)
RBC # BLD AUTO: 3.37 10*6/MM3 (ref 3.77–5.28)
WBC NRBC COR # BLD: 6.57 10*3/MM3 (ref 3.4–10.8)

## 2022-02-08 PROCEDURE — 85025 COMPLETE CBC W/AUTO DIFF WBC: CPT | Performed by: OBSTETRICS & GYNECOLOGY

## 2022-02-08 PROCEDURE — 25010000002 ENOXAPARIN PER 10 MG: Performed by: OBSTETRICS & GYNECOLOGY

## 2022-02-08 PROCEDURE — 25010000002 KETOROLAC TROMETHAMINE PER 15 MG: Performed by: OBSTETRICS & GYNECOLOGY

## 2022-02-08 RX ORDER — IBUPROFEN 600 MG/1
600 TABLET ORAL ONCE
Status: COMPLETED | OUTPATIENT
Start: 2022-02-08 | End: 2022-02-08

## 2022-02-08 RX ORDER — OXYCODONE HYDROCHLORIDE 5 MG/1
5 TABLET ORAL ONCE
Status: DISCONTINUED | OUTPATIENT
Start: 2022-02-08 | End: 2022-02-10 | Stop reason: HOSPADM

## 2022-02-08 RX ADMIN — DOCUSATE SODIUM 100 MG: 100 CAPSULE, LIQUID FILLED ORAL at 19:55

## 2022-02-08 RX ADMIN — KETOROLAC TROMETHAMINE 15 MG: 15 INJECTION, SOLUTION INTRAMUSCULAR; INTRAVENOUS at 10:16

## 2022-02-08 RX ADMIN — ACETAMINOPHEN 650 MG: 325 TABLET, FILM COATED ORAL at 19:55

## 2022-02-08 RX ADMIN — OXYCODONE 10 MG: 5 TABLET ORAL at 11:25

## 2022-02-08 RX ADMIN — ACETAMINOPHEN 1000 MG: 500 TABLET ORAL at 07:49

## 2022-02-08 RX ADMIN — IBUPROFEN 600 MG: 600 TABLET ORAL at 21:50

## 2022-02-08 RX ADMIN — DOCUSATE SODIUM 100 MG: 100 CAPSULE, LIQUID FILLED ORAL at 07:49

## 2022-02-08 RX ADMIN — IBUPROFEN 600 MG: 600 TABLET ORAL at 16:15

## 2022-02-08 RX ADMIN — SIMETHICONE 80 MG: 80 TABLET, CHEWABLE ORAL at 19:55

## 2022-02-08 RX ADMIN — KETOROLAC TROMETHAMINE 15 MG: 15 INJECTION, SOLUTION INTRAMUSCULAR; INTRAVENOUS at 05:20

## 2022-02-08 RX ADMIN — ACETAMINOPHEN 650 MG: 325 TABLET, FILM COATED ORAL at 13:15

## 2022-02-08 RX ADMIN — ENOXAPARIN SODIUM 40 MG: 40 INJECTION SUBCUTANEOUS at 07:50

## 2022-02-08 RX ADMIN — OXYCODONE 5 MG: 5 TABLET ORAL at 13:23

## 2022-02-08 RX ADMIN — ACETAMINOPHEN 1000 MG: 500 TABLET ORAL at 01:30

## 2022-02-08 RX ADMIN — OXYCODONE 10 MG: 5 TABLET ORAL at 15:24

## 2022-02-08 RX ADMIN — SIMETHICONE 80 MG: 80 TABLET, CHEWABLE ORAL at 07:49

## 2022-02-08 RX ADMIN — OXYCODONE 10 MG: 5 TABLET ORAL at 23:20

## 2022-02-08 RX ADMIN — PRENATAL VITAMINS-IRON FUMARATE 27 MG IRON-FOLIC ACID 0.8 MG TABLET 1 TABLET: at 07:49

## 2022-02-08 NOTE — LACTATION NOTE
02/08/22 0830   Maternal Information   Date of Referral 02/08/22   Person Making Referral other (see comments)  (courtesy)   Maternal Reason for Referral breastfeeding currently; other (see comments)  (Baby revieved glucose gel 3 x yesterday per mom.  Parents are supplementing with formula following nursing attempts.  Assisted with waking, positioning, latching, and pace feeding baby.  Baby sleepy at the breast but latched well.  Colostrum easily hand)   Infant Reason for Referral other (see comments)  (expressed.  Mom instructed to allow 15 minute feeding bilateral if baby will nurse that long and then move on to supplementing and pumping.)   Maternal Assessment   Breast Size Issue none   Breast Shape Bilateral:; round   Breast Density Bilateral:; soft   Nipples Bilateral:; everted; graspable; other (see comments)  (irregular shape to nipple)   Left Nipple Symptoms intact   Right Nipple Symptoms intact   Maternal Infant Feeding   Maternal Emotional State receptive   Infant Positioning clutch/football; cross-cradle  (CC on left, FB on right)   Signs of Milk Transfer deep jaw excursions noted  (easy hand expression)   Pain with Feeding no   Comfort Measures Before/During Feeding infant position adjusted; latch adjusted; suction broken using finger   Nipple Shape After Feeding, Left Breast round; symmetrical; appropriately projected   Nipple Shape After Feeding, Right round; symmetrical; appropriately projected   Latch Assistance minimal assistance; verbal guidance offered   Support Person Involvement actively supporting mother; verbally supports mother  (instructed father on pace feeding.  FOB able to take direction into action)   Milk Expression/Equipment   Breast Pump Type double electric, hospital grade   Breast Pump Flange Type hard   Breast Pump Flange Size 24 mm   Breast Pumping   Breast Pumping Interventions post-feed pumping encouraged; frequent pumping encouraged

## 2022-02-08 NOTE — ANESTHESIA POSTPROCEDURE EVALUATION
Patient: Sabra Smart    Procedure Summary     Date: 22 Room / Location: [facility] LABOR DELIVERY   [facility]OR [facility]    Anesthesia Start: 738 Anesthesia Stop: 844    Procedure:  SECTION PRIMARY history of shoulder dystocia (N/A Abdomen) Diagnosis:     Surgeons: Adwoa Lawrence MD Provider: Wen Barrios DO    Anesthesia Type: spinal, ITN ASA Status: 2          Anesthesia Type: spinal, ITN    Vitals  Vitals Value Taken Time   /71 22 0800   Temp 98.2 °F (36.8 °C) 22 0800   Pulse 95 22 0800   Resp 20 22 0800   SpO2 100 % 22 0955           Post Anesthesia Care and Evaluation    Patient location during evaluation: bedside  Patient participation: complete - patient participated  Level of consciousness: awake  Pain score: 6  Pain management: satisfactory to patient  Airway patency: patent  Anesthetic complications: No anesthetic complications  PONV Status: none  Cardiovascular status: acceptable and hemodynamically stable  Respiratory status: acceptable  Hydration status: acceptable  Post Neuraxial Block status: Motor and sensory function returned to baseline and No signs or symptoms of PDPH

## 2022-02-08 NOTE — PROGRESS NOTES
2022    Name:Sabra Smart    MR#:[id number]     PROGRESS NOTE:  Post-Op 1 S/P    HD:1    Subjective   26 y.o. yo Female  s/p CS at 39w1d doing well. Pain well controlled. Tolerating regular diet and having flatus. Lochia normal.     Patient Active Problem List   Diagnosis   • Vaginal delivery   • Shoulder dystocia during labor and delivery   • Obesity affecting pregnancy, antepartum   • History of ectopic pregnancy   • Pregnancy   • Rh negative, antepartum   • Delivery of pregnancy by  section        Objective    Vitals  Temp:  Temp:  [97.7 °F (36.5 °C)-98.7 °F (37.1 °C)] 98.2 °F (36.8 °C)  Temp src: Oral  BP:  BP: ()/(61-92) 121/71  Pulse:  Heart Rate:  [67-98] 95  RR:   Resp:  [16-20] 20    General Awake, alert, no distress  Abdomen Soft, non-distended, fundus firm, below umbilicus, appropriately tender  Incision  Intact, no erythema or exudate  Extremities Calves NT bilaterally     I/O last 3 completed shifts:  In: 1600 [I.V.:1600]  Out: 2373 [Urine:1825; Blood:548]    LABS:   Lab Results   Component Value Date    WBC 7.10 2022    HGB 11.9 (L) 2022    HCT 36.0 2022    MCV 86.1 2022     2022       Infant: female       Assessment   1.  POD 1    Plan: Doing well.  Routine postoperative care      Active Problems:   None      Adwoa Lawrence MD  2022 08:58 EST   We had a lengthy discussion about the treatment options for managing arthritis. Options include activity modification (avoiding activities that make your symptoms worse) but staying active and keeping the legs as strong as possible, physical therapy, OTC medications, nutritional supplements, prescription medications, knee injections with steroid vs. Visco supplementation, and surgical options.   Fitted for reddie brace   Wear brace as needed   Continue Meloxicam as directed   Follow up as needed

## 2022-02-09 PROCEDURE — 25010000002 ENOXAPARIN PER 10 MG: Performed by: OBSTETRICS & GYNECOLOGY

## 2022-02-09 RX ADMIN — ACETAMINOPHEN 650 MG: 325 TABLET, FILM COATED ORAL at 08:20

## 2022-02-09 RX ADMIN — IBUPROFEN 600 MG: 600 TABLET ORAL at 10:55

## 2022-02-09 RX ADMIN — PRENATAL VITAMINS-IRON FUMARATE 27 MG IRON-FOLIC ACID 0.8 MG TABLET 1 TABLET: at 08:21

## 2022-02-09 RX ADMIN — OXYCODONE 10 MG: 5 TABLET ORAL at 03:15

## 2022-02-09 RX ADMIN — IBUPROFEN 600 MG: 600 TABLET ORAL at 22:30

## 2022-02-09 RX ADMIN — DOCUSATE SODIUM 100 MG: 100 CAPSULE, LIQUID FILLED ORAL at 10:55

## 2022-02-09 RX ADMIN — ACETAMINOPHEN 650 MG: 325 TABLET, FILM COATED ORAL at 02:15

## 2022-02-09 RX ADMIN — OXYCODONE 10 MG: 5 TABLET ORAL at 19:48

## 2022-02-09 RX ADMIN — ACETAMINOPHEN 650 MG: 325 TABLET, FILM COATED ORAL at 19:24

## 2022-02-09 RX ADMIN — IBUPROFEN 600 MG: 600 TABLET ORAL at 03:15

## 2022-02-09 RX ADMIN — OXYCODONE 10 MG: 5 TABLET ORAL at 10:55

## 2022-02-09 RX ADMIN — ACETAMINOPHEN 650 MG: 325 TABLET, FILM COATED ORAL at 14:05

## 2022-02-09 RX ADMIN — IBUPROFEN 600 MG: 600 TABLET ORAL at 16:27

## 2022-02-09 RX ADMIN — ENOXAPARIN SODIUM 40 MG: 40 INJECTION SUBCUTANEOUS at 08:21

## 2022-02-09 RX ADMIN — SIMETHICONE 80 MG: 80 TABLET, CHEWABLE ORAL at 14:05

## 2022-02-09 RX ADMIN — OXYCODONE 10 MG: 5 TABLET ORAL at 15:37

## 2022-02-09 NOTE — PROGRESS NOTES
2022    Name:Sabra Smart    MR#:[id number]     PROGRESS NOTE:  Post-Op 2 S/P    HD:2    Subjective   26 y.o. yo Female  s/p CS at 39w2d doing well. Pain well controlled now.  Yesterday she struggled in the afternoon.  Tolerating regular diet and having flatus. Lochia normal.     Patient Active Problem List   Diagnosis   • Vaginal delivery   • Shoulder dystocia during labor and delivery   • Obesity affecting pregnancy, antepartum   • History of ectopic pregnancy   • Pregnancy   • Rh negative, antepartum   • Delivery of pregnancy by  section        Objective    Vitals  Temp:  Temp:  [97.5 °F (36.4 °C)-98.8 °F (37.1 °C)] 98.4 °F (36.9 °C)  Temp src: Oral  BP:  BP: (108-119)/(56-68) 108/68  Pulse:  Heart Rate:  [79-94] 94  RR:   Resp:  [14-20] 18    General Awake, alert, no distress  Abdomen Soft, non-distended, fundus firm, below umbilicus, appropriately tender  Incision  Intact, no erythema or exudate  Extremities Calves NT bilaterally     I/O last 3 completed shifts:  In: -   Out: 2375 [Urine:2375]    LABS:   Lab Results   Component Value Date    WBC 6.57 2022    HGB 9.5 (L) 2022    HCT 29.0 (L) 2022    MCV 86.1 2022     2022       Infant: female       Assessment   1.  POD 2    Plan: Doing well.  Routine postoperative care      Active Problems:   None      Adwoa Lawrence MD  2022 09:24 EST

## 2022-02-10 VITALS
TEMPERATURE: 98 F | HEIGHT: 70 IN | SYSTOLIC BLOOD PRESSURE: 110 MMHG | HEART RATE: 83 BPM | RESPIRATION RATE: 16 BRPM | BODY MASS INDEX: 41.95 KG/M2 | OXYGEN SATURATION: 100 % | DIASTOLIC BLOOD PRESSURE: 69 MMHG | WEIGHT: 293 LBS

## 2022-02-10 PROCEDURE — 0503F POSTPARTUM CARE VISIT: CPT | Performed by: NURSE PRACTITIONER

## 2022-02-10 PROCEDURE — 25010000002 ENOXAPARIN PER 10 MG: Performed by: OBSTETRICS & GYNECOLOGY

## 2022-02-10 RX ORDER — IBUPROFEN 600 MG/1
600 TABLET ORAL EVERY 6 HOURS
Qty: 30 TABLET | Refills: 0 | Status: SHIPPED | OUTPATIENT
Start: 2022-02-10 | End: 2022-03-24

## 2022-02-10 RX ORDER — OXYCODONE HYDROCHLORIDE 5 MG/1
5 TABLET ORAL EVERY 6 HOURS PRN
Qty: 10 TABLET | Refills: 0 | Status: SHIPPED | OUTPATIENT
Start: 2022-02-10 | End: 2022-02-13

## 2022-02-10 RX ADMIN — ENOXAPARIN SODIUM 40 MG: 40 INJECTION SUBCUTANEOUS at 08:33

## 2022-02-10 RX ADMIN — SIMETHICONE 80 MG: 80 TABLET, CHEWABLE ORAL at 08:33

## 2022-02-10 RX ADMIN — IBUPROFEN 600 MG: 600 TABLET ORAL at 11:18

## 2022-02-10 RX ADMIN — PRENATAL VITAMINS-IRON FUMARATE 27 MG IRON-FOLIC ACID 0.8 MG TABLET 1 TABLET: at 08:33

## 2022-02-10 RX ADMIN — DOCUSATE SODIUM 100 MG: 100 CAPSULE, LIQUID FILLED ORAL at 01:46

## 2022-02-10 RX ADMIN — ACETAMINOPHEN 650 MG: 325 TABLET, FILM COATED ORAL at 01:46

## 2022-02-10 RX ADMIN — OXYCODONE 5 MG: 5 TABLET ORAL at 06:11

## 2022-02-10 RX ADMIN — ACETAMINOPHEN 650 MG: 325 TABLET, FILM COATED ORAL at 08:33

## 2022-02-10 RX ADMIN — IBUPROFEN 600 MG: 600 TABLET ORAL at 04:26

## 2022-02-10 NOTE — DISCHARGE SUMMARY
Discharge Summary    Date of Admission: 2022  Date of Discharge:  2/10/2022      Patient: Sabra Smart      MR#:[id number]    Primary Surgeon/OB: Adwoa Lawrence MD    Discharge Surgeon/OB:    Presenting Problem/History of Present Illness  Pregnancy [Z34.90]     Patient Active Problem List   Diagnosis   • Vaginal delivery   • Shoulder dystocia during labor and delivery   • Obesity affecting pregnancy, antepartum   • History of ectopic pregnancy   • Pregnancy   • Rh negative, antepartum   • Delivery of pregnancy by  section         Discharge Diagnosis:  section at 39w3d    Procedures:  , Low Transverse     2022    8:09 AM        Rh Immune globulin given: yes        Discharge Date: 2/10/2022; Discharge Time: 09:49 EST    Early Discharge:  NO    Hospital Course  Patient is a 26 y.o. female  at 39w3d status post  section with uneventful postoperative recovery.  Patient was advanced to regular diet on postoperative day#1.  On discharge, ambulating, tolerating a regular diet without any difficulties and her incision is dry, clean and intact.     Infant:   female  fetus 3798 g (8 lb 6 oz)  with Apgar scores of 7 , 8  at five minutes.    Condition on Discharge:  Stable    Vital Signs  Temp:  [97.8 °F (36.6 °C)-98.1 °F (36.7 °C)] 98 °F (36.7 °C)  Heart Rate:  [83-86] 83  Resp:  [16-18] 16  BP: (109-110)/(61-69) 110/69    Lab Results   Component Value Date    WBC 6.57 2022    HGB 9.5 (L) 2022    HCT 29.0 (L) 2022    MCV 86.1 2022     2022       Discharge Disposition  Home or Self Care    Discharge Medications     Discharge Medications      New Medications      Instructions Start Date   ibuprofen 600 MG tablet  Commonly known as: ADVIL,MOTRIN   600 mg, Oral, Every 6 Hours      oxyCODONE 5 MG immediate release tablet  Commonly known as: ROXICODONE   5 mg, Oral, Every 6 Hours PRN         Continue These Medications       Instructions Start Date   Prenatal 27-1 27-1 MG tablet tablet   1 tablet, Oral, Daily         Stop These Medications    aspirin 81 MG chewable tablet            Discharge Diet:     Activity at Discharge:   Activity Instructions     Driving Restrictions      Type of Restriction: Driving    Driving Restrictions: No Driving (Time Limited)    Length: 2 Weeks    Lifting Restrictions      Type of Restriction: Lifting    Lifting Restrictions: Lifting Restriction (Indicate Limit)    Weight Limit (Pounds): 10    Length of Lifting Restriction: until released    Pelvic Rest            Follow-up Appointments  Future Appointments   Date Time Provider Department Center   2/21/2022 11:45 AM Chanda Quintero APRN [facility]  [facility]     Additional Instructions for the Follow-ups that You Need to Schedule     Call MD With Problems / Concerns   As directed      Discharge Follow-up with Specified Provider: Melinda; 2 Weeks   As directed      To: eMlinda    Follow Up: 2 Weeks    Follow Up Details: already scheduled               BILL Georges  02/10/22  09:48 EST  [facility]

## 2022-02-10 NOTE — CASE MANAGEMENT/SOCIAL WORK
Continued Stay Note  [facility]     Patient Name: Sabra Smart  MRN: [id number]  Today's Date: 2/10/2022    Admit Date: 2/7/2022     Discharge Plan     Row Name 02/10/22 0736       Plan    Plan MSW available    Plan Comments Visited pt. Discussed increased risk for PPD. Pt reports h/o PPD. Denies medication use in past. States she has spoken with her provider about PPD. Pt reports she is doing well at this time and denies concerns. Provided info on PPD.    Final Discharge Disposition Code 01 - home or self-care               Discharge Codes    No documentation.                     Megha Pelaez, MSW

## 2022-02-21 ENCOUNTER — POSTPARTUM VISIT (OUTPATIENT)
Dept: OBSTETRICS AND GYNECOLOGY | Facility: CLINIC | Age: 27
End: 2022-02-21

## 2022-02-21 VITALS — WEIGHT: 293 LBS | DIASTOLIC BLOOD PRESSURE: 64 MMHG | SYSTOLIC BLOOD PRESSURE: 100 MMHG | BODY MASS INDEX: 42.62 KG/M2

## 2022-02-21 PROCEDURE — 0503F POSTPARTUM CARE VISIT: CPT | Performed by: NURSE PRACTITIONER

## 2022-02-21 NOTE — PROGRESS NOTES
Chief Complaint   Patient presents with   • Postpartum Care       2 Week Postpartum Visit         Sabra Smart is a 26 y.o.  s/p , due to h/o shoulder dystocia at 39 0/7 weeks on 2022, who presents today for a 2 week postpartum check.      The incision and is healing well.    Patient admits postpartum depression. She says that it is not as bad as with her first child, but she is crying a lot.  She has been trying to stay on top of it, and has spoke with her PCP regarding it.  She is not currently taking anything for it. Patient describes bleeding as light.  Patient is breast and bottle feeding. She denies issues with her breast.  She had a clogged duct a few days ago, but she was finally able to get relief.  Patient denies bowel or bladder issues.    Postpartum Depression Screening Questionnaire: Was completed and she scored a 13, no treatment indicated at this time, we discussed PPD vs baby blues.  Baby Name: Female- Kelly  Baby Weight: 8 lbs 6 oz  Baby Discharged: Discharged with Mom  Delivering Physician: NORIS    The additional following portions of the patient's history were reviewed and updated as appropriate: allergies, current medications, past family history, past medical history, past social history, past surgical history and problem list.    Review of Systems   Constitutional: Negative.    Respiratory: Negative.    Cardiovascular: Negative.    Gastrointestinal: Negative.    Genitourinary: Negative.    Psychiatric/Behavioral: Positive for depressed mood and stress.     All other systems reviewed and are negative.     I have reviewed and agree with the HPI, ROS, and historical information as entered above. Chanda Quintero, APRN    /64   Wt 135 kg (297 lb)   LMP 05/10/2021 (Exact Date)   Breastfeeding Yes   BMI 42.62 kg/m²     Physical Exam  Constitutional:       Appearance: Normal appearance.   Pulmonary:      Effort: Pulmonary effort is normal.   Abdominal:       Palpations: Abdomen is soft.      Comments: Incision well healed   Neurological:      Mental Status: She is alert.             Assessment and Plan    Problem List Items Addressed This Visit     None      Visit Diagnoses     Encounter for postpartum visit    -  Primary        Call if zoloft desired. She is also talking with her PCP and has great support at home. She is also getting adequate sleep most of the time. Denies SI/HI    1. S/p , 2 weeks postpartum.  Doing well.    2. May drive, can slowly increase activity as tolerated. Continue lifting restrictions maximum 10-15lbs.   3. Follow up in 4 weeks for 6 week post-partum visit.     Chanda Quintero, APRN  2022

## 2022-03-23 PROBLEM — Z34.90 PREGNANCY: Status: RESOLVED | Noted: 2021-06-28 | Resolved: 2022-03-23

## 2022-03-23 PROBLEM — O99.210 OBESITY AFFECTING PREGNANCY, ANTEPARTUM: Status: RESOLVED | Noted: 2020-09-28 | Resolved: 2022-03-23

## 2022-03-24 ENCOUNTER — POSTPARTUM VISIT (OUTPATIENT)
Dept: OBSTETRICS AND GYNECOLOGY | Facility: CLINIC | Age: 27
End: 2022-03-24

## 2022-03-24 VITALS — WEIGHT: 293 LBS | SYSTOLIC BLOOD PRESSURE: 110 MMHG | BODY MASS INDEX: 42.47 KG/M2 | DIASTOLIC BLOOD PRESSURE: 66 MMHG

## 2022-03-24 PROCEDURE — 0503F POSTPARTUM CARE VISIT: CPT | Performed by: OBSTETRICS & GYNECOLOGY

## 2022-03-24 NOTE — PROGRESS NOTES
Chief Complaint   Patient presents with   • Postpartum Care       6 Week Postpartum Visit         Sabra Smart is a 26 y.o.  s/p , due to shoulder dystocia with 1st at 39 weeks on 22, who presents today for a 6 week postpartum check.      At the time of delivery were you diagnosed with any of the following: None. The incision and is healing well.    Patient denies postpartum depression.  Patient describes bleeding as absent.  Patient is breast feeding.  Desires contraceptive methods: Vasectomy  for contraception.  Patient denies bowel or bladder issues. C/O RLQ pain, describes it as a pressure and similar to a pulled muscle.    Postpartum Depression Screening Questionnaire: 8, no treatment indicated. Pt. States she is feeling much better.  Baby Name: Kelly  Baby Weight: 8#6oz  Baby Discharged: Discharged with Mom  Delivering Physician: NORIS    Last Completed Pap Smear          Ordered - PAP SMEAR (Every 3 Years) Ordered on 3/24/2022    2020  Done - neg              Is the patient due for a pap today? Yes.  Performed Today    The additional following portions of the patient's history were reviewed and updated as appropriate: allergies, current medications, past family history, past medical history, past social history, past surgical history and problem list.    Review of Systems   Constitutional: Negative.    HENT: Negative.    Eyes: Negative.    Respiratory: Negative.    Cardiovascular: Negative.    Gastrointestinal: Negative.    Endocrine: Negative.    Genitourinary: Positive for pelvic pain.   Musculoskeletal: Negative.    Skin: Negative.    Allergic/Immunologic: Negative.    Neurological: Negative.    Hematological: Negative.    Psychiatric/Behavioral: Negative.      All other systems reviewed and are negative.     I have reviewed and agree with the HPI, ROS, and historical information as entered above. Adwoa Lawrence MD    /66   Wt 134 kg (296 lb)   LMP  "05/10/2021 (Exact Date)   Breastfeeding Yes   BMI 42.47 kg/m²     Physical Exam  Vitals and nursing note reviewed. Exam conducted with a chaperone present.   Constitutional:       Appearance: She is well-developed.   HENT:      Head: Normocephalic and atraumatic.   Pulmonary:      Effort: Pulmonary effort is normal.   Abdominal:      General: A surgical scar is present.      Palpations: Abdomen is soft. Abdomen is not rigid.      Comments: Clean, Dry, and Intact.  No erythema.    Genitourinary:     Vagina: No lesions or prolapsed vaginal walls.      Cervix: No lesion or erythema.      Uterus: Enlarged. Not tender and no uterine prolapse.       Adnexa:         Right: No mass, tenderness or fullness.          Left: No mass, tenderness or fullness.     Musculoskeletal:      Cervical back: Normal range of motion.   Neurological:      Mental Status: She is alert and oriented to person, place, and time.   Psychiatric:         Mood and Affect: Mood normal.         Behavior: Behavior normal.             Assessment and Plan    Problem List Items Addressed This Visit        Gravid and     Delivery of pregnancy by  section - Primary    Overview     2022-out right  for history of shoulder dystocia. Baby girl \"Lakshmi\" weighing 8 pounds 6 ounces             Other Visit Diagnoses     Postpartum follow-up        Relevant Orders    Pap IG, Rfx HPV ASCU          1. S/p , 6 weeks postpartum.  Doing well.    2. Return to normal physical activity.  No pelvic restrictions.   3. Contraception: contraceptive methods: Vasectomy   4. Return in about 1 year (around 3/24/2023) for Annual physical.     Adwoa Lawrence MD  2022  "

## 2022-12-11 NOTE — TELEPHONE ENCOUNTER
Patient called and had some questions, she said she is pregnant, she said she had a miscarriage last yr     Eleanor Dickey)  Obstetrics and Gynecology  [address], [address]  [place] [address]  Phone: [phone]  Fax: [phone]  Follow Up Time:

## 2022-12-12 ENCOUNTER — TELEPHONE (OUTPATIENT)
Dept: OBSTETRICS AND GYNECOLOGY | Facility: CLINIC | Age: 27
End: 2022-12-12

## 2022-12-12 ENCOUNTER — CLINICAL SUPPORT (OUTPATIENT)
Dept: OBSTETRICS AND GYNECOLOGY | Facility: CLINIC | Age: 27
End: 2022-12-12

## 2022-12-12 ENCOUNTER — LAB (OUTPATIENT)
Dept: OBSTETRICS AND GYNECOLOGY | Facility: CLINIC | Age: 27
End: 2022-12-12

## 2022-12-12 DIAGNOSIS — O46.90 VAGINAL BLEEDING IN PREGNANCY: Primary | ICD-10-CM

## 2022-12-12 DIAGNOSIS — O20.9 BLEEDING IN EARLY PREGNANCY: Primary | ICD-10-CM

## 2022-12-12 PROCEDURE — 96372 THER/PROPH/DIAG INJ SC/IM: CPT | Performed by: OBSTETRICS & GYNECOLOGY

## 2022-12-12 NOTE — TELEPHONE ENCOUNTER
Provider: DR SHEIKH  Caller: LUZ STARKS  Relationship to Patient: SELF  Phone Number: [phone]  Reason for Call: TOOK PREGNANCY TEST AND POSITIVE//DOESNT KNOW LMP-LAST ONE CAN REMEMBER WAS IN SEPT//IS HAVING LIGHT SPOTTING AND CRAMPING//PATIENT HAS HX OF A-TOPIC PREGNANCIES//PLEASE CALL TO SCHEDULE HCG LAB WORK AND SCHEDULING  CAN BE REACHED AT CELL ANYTIME AND CAN LEAVE MESSAGE

## 2022-12-12 NOTE — TELEPHONE ENCOUNTER
Pt. Reports irregular periods since delivery in Jan but more often closer together than farther apart. She had not taken a UPT prior to last week. She reports spotting began 2 weeks ago but is a little more bright red starting yesterday.     Per JNA: come in for stat hcg, progesterone and Rhogam injection. Pt. Notified. Will be here after her meeting.

## 2022-12-12 NOTE — PROGRESS NOTES
Rhogam Injection Note  Sabra Smart is a 27 y.o. female. she is here for a Rhogham injection. Patient is Unknown pregnant. Pt is cramping, vaginal spotting with home UPT. Beta Hcg drawn today.    Patient Questions  Have you ever passed out from an injection? No     Have you ever had a reaction to an injection? No    Are you allergic to any medications? No    Have you been sick in the last month? No    Injection Details  Noted in the Immunizations Activity.    Toleration  Patient tolerated the injection well with no problems.    Electronically Signed By:  Clover Lopez RN  12/12/2022

## 2022-12-13 LAB — PROGEST SERPL-MCNC: 13.8 NG/ML

## 2022-12-14 ENCOUNTER — LAB (OUTPATIENT)
Dept: OBSTETRICS AND GYNECOLOGY | Facility: CLINIC | Age: 27
End: 2022-12-14

## 2022-12-14 DIAGNOSIS — Z87.59 HISTORY OF ECTOPIC PREGNANCY: Primary | ICD-10-CM

## 2022-12-15 ENCOUNTER — TELEPHONE (OUTPATIENT)
Dept: OBSTETRICS AND GYNECOLOGY | Facility: CLINIC | Age: 27
End: 2022-12-15

## 2022-12-15 DIAGNOSIS — Z34.90 PREGNANCY, UNSPECIFIED GESTATIONAL AGE: ICD-10-CM

## 2022-12-15 DIAGNOSIS — Z34.81 PRENATAL CARE, SUBSEQUENT PREGNANCY, FIRST TRIMESTER: Primary | ICD-10-CM

## 2022-12-15 LAB — HCG INTACT+B SERPL-ACNC: 47.7 MIU/ML

## 2022-12-15 NOTE — TELEPHONE ENCOUNTER
Called to review labs with patient.  HCG 12/12- 38.93 and 12/14- 47.7.      Reviewed with patient, and advised her to RTO on Monday to repeat.  Patient verified and voiced understanding.    Orders placed

## 2022-12-19 ENCOUNTER — LAB (OUTPATIENT)
Dept: OBSTETRICS AND GYNECOLOGY | Facility: CLINIC | Age: 27
End: 2022-12-19

## 2022-12-20 ENCOUNTER — TELEPHONE (OUTPATIENT)
Dept: OBSTETRICS AND GYNECOLOGY | Facility: CLINIC | Age: 27
End: 2022-12-20

## 2022-12-20 LAB — HCG INTACT+B SERPL-ACNC: 247 MIU/ML

## 2022-12-20 NOTE — TELEPHONE ENCOUNTER
Provider: DR. SHEIKH    Caller: LUZ PRETTY    Relationship to Patient: SELF    Pharmacy:     Phone Number: [phone]    Reason for Call: PT WOULD LIKE HER HCG LAB RESULTS. NA AT CLINICAL. NOTHING ON MYCHART SHE SAYS. PLS CALL HER ANYTIME & CAN LVM IF NA  .  When was the patient last seen: 12-19

## 2022-12-21 ENCOUNTER — TELEPHONE (OUTPATIENT)
Dept: OBSTETRICS AND GYNECOLOGY | Facility: CLINIC | Age: 27
End: 2022-12-21

## 2022-12-21 ENCOUNTER — OFFICE VISIT (OUTPATIENT)
Dept: OBSTETRICS AND GYNECOLOGY | Facility: CLINIC | Age: 27
End: 2022-12-21

## 2022-12-21 VITALS
BODY MASS INDEX: 41.95 KG/M2 | HEIGHT: 70 IN | SYSTOLIC BLOOD PRESSURE: 110 MMHG | WEIGHT: 293 LBS | DIASTOLIC BLOOD PRESSURE: 64 MMHG

## 2022-12-21 DIAGNOSIS — O20.0 THREATENED MISCARRIAGE: Primary | ICD-10-CM

## 2022-12-21 PROCEDURE — 99214 OFFICE O/P EST MOD 30 MIN: CPT | Performed by: NURSE PRACTITIONER

## 2022-12-21 NOTE — TELEPHONE ENCOUNTER
Dagoberto Houser- patient HCG rising appropriately and to keep NOB with JNA on 12/27/2022. Informed patient of this and she v/u.

## 2022-12-21 NOTE — PROGRESS NOTES
"    Chief Complaint   Patient presents with   • Threatened Miscarriage          HPI  Sabra Kenia Smart is a 27 y.o. female, , who presents with cramping and and  Previous history of ectopic pregnancy.    Had HCG on = 39   Progesterone on = 13.8  And pt. Was started on progesterone         HCG on = 48         HCG on = 247    Pt states she has not had significant bleeding since she got her Rhogam on 22. She starting bleeding 22 and stopped 22.     Pt called yesterday and reported cramping. Zane who instructed pt to come in today for u/s and repeat labs.  She has a h/o ectopic in 2020.    Recent Tests:  She had a 22 which was positive.    US today: Yes. \"small anechoic structure seen within fundus of endometrium with echogenic borders;characteristics are consistent with possible gestations sac measuring 4 weeks 6 days. Gestational sac visualized. Yolk sac and embryo not visualized. Right ovar small paraovarian simple cyst 12 x 9 x 8mm. Left ovary subtle isoechoic area seen anterior to left ovary measuring 10 x 9 x 10mm/   She has not had prenatal care.  She denies associated abdominal or pelvic pain.. Her past medical history is notable for ectopic pregnancy in 2020.  She does not have passage of tissue.  Rh Status: Negative.  Rhogam was given on 22..  She reports no additional symptoms or complaints.    The additional following portions of the patient's history were reviewed and updated as appropriate: allergies and current medications.    Review of Systems   Constitutional: Negative.    Gastrointestinal: Negative.    Genitourinary: Positive for pelvic pain.     All other systems reviewed and are negative.     I have reviewed and agree with the HPI, ROS, and historical information as entered above. Leyda Mathews, APRN    Objective   /64   Ht 177.8 cm (70\")   Wt (!) 145 kg (320 lb 3.2 oz)   LMP 2022 (Approximate)   Breastfeeding No   BMI " 45.94 kg/m²     Physical Exam  Vitals and nursing note reviewed.   Constitutional:       Appearance: Normal appearance.   Abdominal:      Palpations: Abdomen is soft.      Tenderness: There is abdominal tenderness in the suprapubic area.      Comments: Mid pelvic tenderness    Neurological:      Mental Status: She is alert.            Assessment and Plan    Problem List Items Addressed This Visit    None  Visit Diagnoses     Threatened miscarriage    -  Primary    Relevant Orders    US Ob Transvaginal    Comprehensive Metabolic Panel    CBC (No Diff)    HCG, B-subunit, Quantitative          1. Ultrasound findings reviewed with pt. Small anechoic structure seen within fundus of endometrium with echogenic borders, characteristics are consistent with possible gest sac measuring 4w6d.   2. H/o ectopic pregnancy in past,  HCG, CBC, CMP labs today.  3. Has U/S scheduled 12/27/22 and to see    4. Continue pelvic rest and call for increased pain or heavy bleeding.  5. Already received Rhogam on 12/12/22 for spotting in early pregnancy.         Counseling was given to patient for the following topics: diagnostic results, instructions for management, risk factor reductions and prognosis . Total time of the encounter was 60 minutes and 20 minutes was spend counseling.      Leyda Mathews, APRN  12/21/2022

## 2022-12-27 ENCOUNTER — OFFICE VISIT (OUTPATIENT)
Dept: OBSTETRICS AND GYNECOLOGY | Facility: CLINIC | Age: 27
End: 2022-12-27

## 2022-12-27 VITALS — BODY MASS INDEX: 46.2 KG/M2 | SYSTOLIC BLOOD PRESSURE: 120 MMHG | DIASTOLIC BLOOD PRESSURE: 80 MMHG | WEIGHT: 293 LBS

## 2022-12-27 DIAGNOSIS — O20.0 THREATENED MISCARRIAGE: Primary | ICD-10-CM

## 2022-12-27 PROCEDURE — 99213 OFFICE O/P EST LOW 20 MIN: CPT | Performed by: OBSTETRICS & GYNECOLOGY

## 2022-12-27 NOTE — PROGRESS NOTES
Chief Complaint   Patient presents with   • Threatened Miscarriage          HPI  Sabra Smart is a 27 y.o. female, , who presents for follow up on  a TAB. She was seen  with Leyda Mathews to rule out ectopic based on hcg results and h/o ectopic in . At that visit US reveal a possible 4 wk gestational sac in the uterus. Hcg was 708 at that time. She reports bleeding has continued but is very light pink and light in flow (just when wiping). Reports some mild cramping. Reports a burning sensation in her stomach mainly at night.     Recent Tests:  She had a quantitative hCG pregnancy test that was done 1 week ago that was positive..    US today: Yes.  Findings showed GS measuring 5w3d. No embryo or cardiac activity seen.  I have personally evaluated the U/S and agree with the findings. Adwoa Lawrence MD  She has not had prenatal care.  She complains of cramping pain.  The pain is located in her pelvis. Her past medical history is notable for ectopic pregnancy.    Rh Status: Negative.  Rhogam was given on 22..  She reports no additional symptoms or complaints.    The additional following portions of the patient's history were reviewed and updated as appropriate: allergies, current medications, past family history, past medical history, past social history, past surgical history and problem list.    Review of Systems   Constitutional: Negative.    HENT: Negative.    Eyes: Negative.    Respiratory: Negative.    Cardiovascular: Negative.    Gastrointestinal: Positive for abdominal pain.   Endocrine: Negative.    Genitourinary: Positive for vaginal bleeding.   Musculoskeletal: Negative.    Skin: Negative.    Allergic/Immunologic: Negative.    Neurological: Negative.    Hematological: Negative.    Psychiatric/Behavioral: Negative.      All other systems reviewed and are negative.     I have reviewed and agree with the HPI, ROS, and historical information as entered above. Adwoa  Ld Lawrence MD    Objective   /80   Wt (!) 146 kg (322 lb)   LMP 09/14/2022 (Approximate)   BMI 46.20 kg/m²     Physical Exam  Vitals and nursing note reviewed.   Constitutional:       Appearance: Normal appearance. She is well-developed.   HENT:      Head: Normocephalic and atraumatic.   Pulmonary:      Effort: Pulmonary effort is normal.      Breath sounds: Normal breath sounds.   Abdominal:      General: There is no distension.      Palpations: Abdomen is soft. Abdomen is not rigid. There is no mass.      Tenderness: There is no abdominal tenderness. There is no guarding or rebound.   Musculoskeletal:      Cervical back: Normal range of motion.   Skin:     General: Skin is warm and dry.   Neurological:      Mental Status: She is alert and oriented to person, place, and time.   Psychiatric:         Mood and Affect: Mood normal.         Behavior: Behavior normal.            Assessment and Plan    Problem List Items Addressed This Visit    None  Visit Diagnoses     Threatened miscarriage    -  Primary    Relevant Orders    US Ob Transvaginal          1. Threatened AB.  Most likely early gestation.  2. Status post RhoGAM  3. Repeat U/S in 2 week(s).  4. Pelvic Rest.  No douching, intercourse or use of tampons.  Return in about 2 weeks (around 1/10/2023) for ultrasound.    Counseling was given to patient and family for the following topics: diagnostic results, instructions for management, patient and family education and impressions . Total time of the encounter was 25 minutes and 20 minutes was spend counseling.      Adwoa Lawrence MD  12/27/2022

## 2023-01-02 LAB
ALBUMIN SERPL-MCNC: 4.5 G/DL (ref 3.5–5.2)
ALBUMIN/GLOB SERPL: 1.8 G/DL
ALP SERPL-CCNC: 75 U/L (ref 39–117)
ALT SERPL-CCNC: 28 U/L (ref 1–33)
AST SERPL-CCNC: 24 U/L (ref 1–32)
BILIRUB SERPL-MCNC: 0.4 MG/DL (ref 0–1.2)
BUN SERPL-MCNC: 11 MG/DL (ref 6–20)
BUN/CREAT SERPL: 16.9 (ref 7–25)
CALCIUM SERPL-MCNC: 9.3 MG/DL (ref 8.6–10.5)
CHLORIDE SERPL-SCNC: 104 MMOL/L (ref 98–107)
CO2 SERPL-SCNC: 22 MMOL/L (ref 22–29)
CREAT SERPL-MCNC: 0.65 MG/DL (ref 0.57–1)
EGFRCR SERPLBLD CKD-EPI 2021: 125.5 ML/MIN/1.73
ERYTHROCYTE [DISTWIDTH] IN BLOOD BY AUTOMATED COUNT: 14.6 % (ref 12.3–15.4)
GLOBULIN SER CALC-MCNC: 2.5 GM/DL
GLUCOSE SERPL-MCNC: 88 MG/DL (ref 65–99)
HCG INTACT+B SERPL-ACNC: 709 MIU/ML
HCT VFR BLD AUTO: 41.1 % (ref 34–46.6)
HGB BLD-MCNC: 13.1 G/DL (ref 12–15.9)
MCH RBC QN AUTO: 26.7 PG (ref 26.6–33)
MCHC RBC AUTO-ENTMCNC: 31.9 G/DL (ref 31.5–35.7)
MCV RBC AUTO: 83.9 FL (ref 79–97)
PLATELET # BLD AUTO: 324 10E3/MM3 (ref 140–450)
POTASSIUM SERPL-SCNC: 4.4 MMOL/L (ref 3.5–5.2)
PROT SERPL-MCNC: 7 G/DL (ref 6–8.5)
RBC # BLD AUTO: 4.9 10E6/MM3 (ref 3.77–5.28)
SODIUM SERPL-SCNC: 139 MMOL/L (ref 136–145)
WBC # BLD AUTO: 5.73 10E3/MM3 (ref 3.4–10.8)

## 2023-01-10 ENCOUNTER — INITIAL PRENATAL (OUTPATIENT)
Dept: OBSTETRICS AND GYNECOLOGY | Facility: CLINIC | Age: 28
End: 2023-01-10
Payer: COMMERCIAL

## 2023-01-10 VITALS — WEIGHT: 293 LBS | BODY MASS INDEX: 45.48 KG/M2 | DIASTOLIC BLOOD PRESSURE: 80 MMHG | SYSTOLIC BLOOD PRESSURE: 122 MMHG

## 2023-01-10 DIAGNOSIS — Z67.91 RH NEGATIVE, ANTEPARTUM: ICD-10-CM

## 2023-01-10 DIAGNOSIS — Z34.80 PRENATAL CARE OF MULTIGRAVIDA, ANTEPARTUM: ICD-10-CM

## 2023-01-10 DIAGNOSIS — Z34.80 MULTIGRAVIDA, ANTEPARTUM: Primary | ICD-10-CM

## 2023-01-10 DIAGNOSIS — O99.210 OBESITY IN PREGNANCY, ANTEPARTUM: ICD-10-CM

## 2023-01-10 DIAGNOSIS — O26.899 RH NEGATIVE, ANTEPARTUM: ICD-10-CM

## 2023-01-10 DIAGNOSIS — O09.899 SHORT INTERVAL BETWEEN PREGNANCIES AFFECTING PREGNANCY, ANTEPARTUM: ICD-10-CM

## 2023-01-10 PROCEDURE — 0501F PRENATAL FLOW SHEET: CPT | Performed by: OBSTETRICS & GYNECOLOGY

## 2023-01-10 NOTE — PROGRESS NOTES
Initial ob visit     CC- Here for care of pregnancy        Sabra Smart is a 27 y.o. female, , who presents for her first obstetrical visit.  Her last LMP was Patient's last menstrual period was 2022 (approximate).. Her KEIRA is 2023, by Ultrasound. Current GA is 8w0d.     Initial positive test date : 12/10/22, UPT        Her periods are: every 4 weeks aprx  Prior obstetric issues: shoulder dystocia  Patient's past medical history is significant for: none.  Family history of genetic issues (includes FOB): none  Prior infections concerning in pregnancy (Rash, fever in last 2 weeks): No  Varicella Hx - vaccinated  Prior testing for Cystic Fibrosis Carrier or Sickle Cell Trait- never  Prepregnancy BMI - Body mass index is 45.48 kg/m².  History of STD: no  Hx of HSV for patient or partner: no  US done today: Yes.  Findings showed single viable IUP measuring 8w0d.  I have personally evaluated the U/S and agree with the findings. Adwoa Lawrence MD     OB History    Para Term  AB Living   4 1 1 0 1 1   SAB IAB Ectopic Molar Multiple Live Births   0 0 1 0 0 1      # Outcome Date GA Lbr Carl/2nd Weight Sex Delivery Anes PTL Lv   4 Current            3 Ectopic 20           2 Term 19 39w2d 11:05 / 02:50 3655 g (8 lb 0.9 oz) F Vag-Spont EPI N CARLY      Complications: Shoulder Dystocia   1                 Additional Pertinent History   Last Pap : 3/24/22 Result: negative HPV: not done     Last Completed Pap Smear          Ordered - PAP SMEAR (Every 3 Years) Ordered on 2022  SCANNED - PAP SMEAR    2020  Done - neg              History of abnormal Pap smear: no  Family history of uterine, colon, breast, or ovarian cancer: yes - MGM had ovarian cancer  Feelings of Anxiety or Depression: yes - anxiety  Tobacco Usage?: No   Alcohol/Drug Use?: NO  Over the age of 35 at delivery: no  Desires Genetic Screening: unsure  Flu Status:  Declines    PMH    Current Outpatient Medications:   •  Prenatal Vit-Fe Fumarate-FA (PRENATAL 27-1) 27-1 MG tablet tablet, Take 1 tablet by mouth Daily., Disp: , Rfl:      Past Medical History:   Diagnosis Date   • Ectopic pregnancy    • History of COVID-19 2022   • Morbid obesity with BMI of 40.0-44.9, adult (HCC)    •  (spontaneous vaginal delivery)         Past Surgical History:   Procedure Laterality Date   •  SECTION N/A 2022    Procedure:  SECTION PRIMARY history of shoulder dystocia;  Surgeon: Adwoa Lawrence MD;  Location: [facility] LABOR DELIVERY;  Service: Obstetrics/Gynecology;  Laterality: N/A;   • EAR TUBES     • KNEE ARTHROSCOPY  2020   • MOUTH SURGERY     • TONSILLECTOMY     • WISDOM TOOTH EXTRACTION         Review of Systems   Review of Systems  Patient Reports: Nausea and vomiting. Vomiting aprx 2 times per week.  Patient Denies: Spotting and Heavy bleeding  All systems reviewed and otherwise normal.    I have reviewed and agree with the HPI, ROS, and historical information as entered above. Adwoa Lawrence MD    /80   Wt (!) 144 kg (317 lb)   LMP 2022 (Approximate)   BMI 45.48 kg/m²     The additional following portions of the patient's history were reviewed and updated as appropriate: allergies, current medications, past family history, past medical history, past social history, past surgical history and problem list.    Physical Exam  General:  well developed; well nourished  no acute distress   Chest/Respiratory: No labored breathing, normal respiratory effort, normal appearance, no respiratory noises noted   Heart:  normal rate, regular rhythm,  no murmurs, rubs, or gallops   Thyroid: normal to inspection and palpation   Breasts:  Not performed.   Abdomen: soft, non-tender; no masses  no umbilical or inguinal hernias are present  no hepato-splenomegaly   Pelvis: Not performed.        Assessment and Plan    Problem List Items Addressed  This Visit        Gravid and     Rh negative, antepartum    Overview     Status post RhoGam on 2021 for spotting         Delivery of pregnancy by  section    Overview     2022-out right  for history of shoulder dystocia. Baby girl \"Lakshmi\" weighing 8 pounds 6 ounces         Prenatal care, subsequent pregnancy    Obesity in pregnancy, antepartum    Overview     hgb a1c at NOB   early glucola 12 weeks-  Baby asa 12-36 weeks  U/s at 32/37 weeks for growth         Relevant Orders    US Ob < 14 Weeks Single or First Gestation    Short interval between pregnancies affecting pregnancy, antepartum   Other Visit Diagnoses     Multigravida, antepartum    -  Primary    Relevant Orders    Obstetric Panel    HIV-1 / O / 2 Ag / Antibody 4th Generation    Urine Culture - Urine, Urine, Random Void    Chlamydia trachomatis, Neisseria gonorrhoeae, PCR - , Urine, Random Void    Hemoglobin A1c          1. Pregnancy at 8w0d  2. Reviewed routine prenatal care with the office and educational materials given  3. Discussed options for genetic testing including first trimester nuchal translucency screen, genetic disease carrier testing, quadruple screen, and [place].  4. Patient is on Prenatal vitamins  5. Activity recommendation : 150 minutes/week of moderate intensity aerobic activity unless we limit for bleeding, hypertension or other pregnancy complication   6. U/S ordered at follow up  7. Recommend limiting weight gain to 15 to 20 pounds in pregnancy.   8. Discussed carbohydrate control.   9. hgb A1C today  10. Recommended early 1 hr gtt next visit  11. discussed baby aspirin from 10-36 weeks for prevention of preeclampsia   Return in about 4 weeks (around 2023) for glucola, ultrasound.      Adwoa Lawrence MD  01/10/2023

## 2023-01-12 LAB
ABO GROUP BLD: NORMAL
BACTERIA UR CULT: NORMAL
BACTERIA UR CULT: NORMAL
BASOPHILS # BLD AUTO: 0 X10E3/UL (ref 0–0.2)
BASOPHILS NFR BLD AUTO: 0 %
BLD GP AB SCN SERPL QL: NORMAL
BLD GP AB SCN SERPL QL: POSITIVE
BLOOD GROUP ANTIBODIES SERPL: ABNORMAL
C TRACH RRNA SPEC QL NAA+PROBE: NEGATIVE
EOSINOPHIL # BLD AUTO: 0.1 X10E3/UL (ref 0–0.4)
EOSINOPHIL NFR BLD AUTO: 1 %
ERYTHROCYTE [DISTWIDTH] IN BLOOD BY AUTOMATED COUNT: 14.4 % (ref 11.7–15.4)
HBA1C MFR BLD: 5.4 % (ref 4.8–5.6)
HBV SURFACE AG SERPL QL IA: NEGATIVE
HCT VFR BLD AUTO: 40.3 % (ref 34–46.6)
HCV AB S/CO SERPL IA: <0.1 S/CO RATIO (ref 0–0.9)
HGB BLD-MCNC: 13 G/DL (ref 11.1–15.9)
HIV 1+2 AB+HIV1 P24 AG SERPL QL IA: NON REACTIVE
IMM GRANULOCYTES # BLD AUTO: 0 X10E3/UL (ref 0–0.1)
IMM GRANULOCYTES NFR BLD AUTO: 0 %
LYMPHOCYTES # BLD AUTO: 1.2 X10E3/UL (ref 0.7–3.1)
LYMPHOCYTES NFR BLD AUTO: 15 %
MCH RBC QN AUTO: 26.6 PG (ref 26.6–33)
MCHC RBC AUTO-ENTMCNC: 32.3 G/DL (ref 31.5–35.7)
MCV RBC AUTO: 83 FL (ref 79–97)
MONOCYTES # BLD AUTO: 0.5 X10E3/UL (ref 0.1–0.9)
MONOCYTES NFR BLD AUTO: 6 %
N GONORRHOEA RRNA SPEC QL NAA+PROBE: NEGATIVE
NEUTROPHILS # BLD AUTO: 6.5 X10E3/UL (ref 1.4–7)
NEUTROPHILS NFR BLD AUTO: 78 %
PLATELET # BLD AUTO: 310 X10E3/UL (ref 150–450)
RBC # BLD AUTO: 4.88 X10E6/UL (ref 3.77–5.28)
RH BLD: NEGATIVE
RPR SER QL: NON REACTIVE
RUBV IGG SERPL IA-ACNC: 1.89 INDEX
WBC # BLD AUTO: 8.3 X10E3/UL (ref 3.4–10.8)
XXX BLOOD GROUP AB TITR SERPL AHG: ABNORMAL {TITER}

## 2023-01-26 LAB — HCG INTACT+B SERPL-ACNC: 39 MIU/ML

## 2023-02-01 ENCOUNTER — ROUTINE PRENATAL (OUTPATIENT)
Dept: OBSTETRICS AND GYNECOLOGY | Facility: CLINIC | Age: 28
End: 2023-02-01
Payer: COMMERCIAL

## 2023-02-01 ENCOUNTER — TELEPHONE (OUTPATIENT)
Dept: OBSTETRICS AND GYNECOLOGY | Facility: CLINIC | Age: 28
End: 2023-02-01
Payer: COMMERCIAL

## 2023-02-01 VITALS — DIASTOLIC BLOOD PRESSURE: 64 MMHG | WEIGHT: 293 LBS | SYSTOLIC BLOOD PRESSURE: 124 MMHG | BODY MASS INDEX: 45.97 KG/M2

## 2023-02-01 DIAGNOSIS — O46.90 VAGINAL BLEEDING IN PREGNANCY: Primary | ICD-10-CM

## 2023-02-01 LAB
BILIRUB BLD-MCNC: NEGATIVE MG/DL
CLARITY, POC: ABNORMAL
COLOR UR: ABNORMAL
GLUCOSE UR STRIP-MCNC: NEGATIVE MG/DL
KETONES UR QL: NEGATIVE
LEUKOCYTE EST, POC: NEGATIVE
NITRITE UR-MCNC: NEGATIVE MG/ML
PH UR: 6.5 [PH] (ref 5–8)
PROT UR STRIP-MCNC: NEGATIVE MG/DL
RBC # UR STRIP: NEGATIVE /UL
SP GR UR: 1.02 (ref 1–1.03)
UROBILINOGEN UR QL: ABNORMAL

## 2023-02-01 PROCEDURE — 0502F SUBSEQUENT PRENATAL CARE: CPT | Performed by: NURSE PRACTITIONER

## 2023-02-01 NOTE — PROGRESS NOTES
"      OB FOLLOW UP  CC- Here for care of pregnancy        Sabra Smart is a 27 y.o.  11w1d patient being seen today for vaginal bleeding and cramping.     Phone note: \" [facility] OB patient  11w1d  MBT: A-, Rhogam: 2022     S/w patient- patient reports pelvic cramping and vaginal bleeding. Patient describes pelvic pain as aching. Patient rates pain a 4-5/10. Patient states pelvic pain began yesterday. Patient states that she is getting over a sinus infection and has been coughing frequently. Patient states that she felt like she had peed herself and went to the bathroom and noticed bright red blood with wiping. Patient states that she went to the bathroom 10 minutes minutes ago and vaginal spotting was light pink with wiping. Patient reports urinary frequency since pregnancy. Patient denies IC, heavy lifting, exercise, and straining with BM. Patient denies bowel problems.      Per JNA- patient to come in for US and evaluation. Appointment scheduled.\"     Patient states that she is no longer having bleeding at this time.     Her prenatal care is complicated by (and status) :    Patient Active Problem List   Diagnosis   • Vaginal delivery   • History of ectopic pregnancy   • Rh negative, antepartum   • Delivery of pregnancy by  section   • Prenatal care, subsequent pregnancy   • Obesity in pregnancy, antepartum   • Short interval between pregnancies affecting pregnancy, antepartum         Ultrasound Today: Yes.  Findings showed single viable IUP with , no evidence of AMINA.  I have personally evaluated the U/S and agree with the findings. BILL Georges    ROS -   Patient Reports : Vaginal Spotting, Cramping   Patient Denies: Loss of Fluid, Vision Changes, Headaches, Nausea , Vomiting  and Epigastric pain  Fetal Movement : absent  All other systems reviewed and are negative.       The additional following portions of the patient's history were reviewed and updated as appropriate: " allergies, current medications, past family history, past medical history, past social history, past surgical history and problem list.    I have reviewed and agree with the HPI, ROS, and historical information as entered above. Chanda Quintero, APRN    /64   Wt (!) 145 kg (320 lb 6.4 oz)   LMP 09/14/2022 (Approximate)   BMI 45.97 kg/m²       EXAM:     Prenatal Vitals  BP: 124/64  Weight: (!) 145 kg (320 lb 6.4 oz)   Fetal Heart Rate: 167                Assessment and Plan    Problem List Items Addressed This Visit    None  Visit Diagnoses     Vaginal bleeding in pregnancy    -  Primary    Relevant Orders    POC Urinalysis Dipstick (Completed)          1. Pregnancy at 11w1d  2. Fetal status reassuring.   3. Rhogam N/A.  4. RTC for worsening symptoms   5. Bleeding of unnknown etilogy with normal U/S now improving.  6. Rhogam was given 12/12/22 so not indicated again.   7. Call for persistent BRB and/or pain. CCUA negative.     Chanda Quintero, BILL  02/01/2023

## 2023-02-01 NOTE — TELEPHONE ENCOUNTER
[facility] OB patient  11w1d  MBT: A-, Rhogam: 12/12/2022    S/w patient- patient reports pelvic cramping and vaginal bleeding. Patient describes pelvic pain as aching. Patient rates pain a 4-5/10. Patient states pelvic pain began yesterday. Patient states that she is getting over a sinus infection and has been coughing frequently. Patient states that she felt like she had peed herself and went to the bathroom and noticed bright red blood with wiping. Patient states that she went to the bathroom 10 minutes minutes ago and vaginal spotting was light pink with wiping. Patient reports urinary frequency since pregnancy. Patient denies IC, heavy lifting, exercise, and straining with BM. Patient denies bowel problems.     Per JNA- patient to come in for US and evaluation. Appointment scheduled.

## 2023-02-07 ENCOUNTER — ROUTINE PRENATAL (OUTPATIENT)
Dept: OBSTETRICS AND GYNECOLOGY | Facility: CLINIC | Age: 28
End: 2023-02-07
Payer: COMMERCIAL

## 2023-02-07 VITALS — SYSTOLIC BLOOD PRESSURE: 110 MMHG | DIASTOLIC BLOOD PRESSURE: 70 MMHG | WEIGHT: 293 LBS | BODY MASS INDEX: 45.69 KG/M2

## 2023-02-07 DIAGNOSIS — O26.899 RH NEGATIVE, ANTEPARTUM: ICD-10-CM

## 2023-02-07 DIAGNOSIS — F41.9 ANXIETY: ICD-10-CM

## 2023-02-07 DIAGNOSIS — O36.8390 UNABLE TO HEAR FETAL HEART TONES AS REASON FOR ULTRASOUND SCAN: ICD-10-CM

## 2023-02-07 DIAGNOSIS — Z34.81 ENCOUNTER FOR SUPERVISION OF OTHER NORMAL PREGNANCY IN FIRST TRIMESTER: ICD-10-CM

## 2023-02-07 DIAGNOSIS — Z67.91 RH NEGATIVE, ANTEPARTUM: ICD-10-CM

## 2023-02-07 DIAGNOSIS — O99.210 OBESITY IN PREGNANCY, ANTEPARTUM: ICD-10-CM

## 2023-02-07 DIAGNOSIS — Z34.80 PRENATAL CARE OF MULTIGRAVIDA, ANTEPARTUM: ICD-10-CM

## 2023-02-07 DIAGNOSIS — O09.899 SHORT INTERVAL BETWEEN PREGNANCIES AFFECTING PREGNANCY, ANTEPARTUM: ICD-10-CM

## 2023-02-07 LAB
GLUCOSE 1H P 50 G GLC PO SERPL-MCNC: 100 MG/DL (ref 65–139)
GLUCOSE UR STRIP-MCNC: NEGATIVE MG/DL
PROT UR STRIP-MCNC: NEGATIVE MG/DL

## 2023-02-07 PROCEDURE — 0502F SUBSEQUENT PRENATAL CARE: CPT | Performed by: OBSTETRICS & GYNECOLOGY

## 2023-02-07 NOTE — PROGRESS NOTES
OB FOLLOW UP  CC- Here for care of pregnancy        Sabra Smart is a 27 y.o.  12w0d patient being seen today for her obstetrical follow up visit. Patient reports an episode of headache and chest pain that radiated to her (L) shoulder on  night that lasted for about an hour.  She denies SOB.  HR today is upper 80s.  She has a h/o anxiety. She has not had vaginal bleeding since she was seen last week.  cfdna today and early GTT.     Her prenatal care is complicated by (and status) :   Patient Active Problem List   Diagnosis   • Vaginal delivery   • History of ectopic pregnancy   • Rh negative, antepartum   • Delivery of pregnancy by  section   • Prenatal care, subsequent pregnancy   • Obesity in pregnancy, antepartum   • Short interval between pregnancies affecting pregnancy, antepartum       Desires genetic testing?: Yes with Gender  Flu Status: Declines  Ultrasound Today: No    ROS -     Patient Denies: Loss of Fluid, Vaginal Spotting, Vision Changes, Nausea  and Vomiting   All other systems reviewed and are negative.     The additional following portions of the patient's history were reviewed and updated as appropriate: allergies, current medications, past family history, past medical history, past social history, past surgical history and problem list.    I have reviewed and agree with the HPI, ROS, and historical information as entered above. Adwoa Lawrence MD    /70   Wt (!) 144 kg (318 lb 6.4 oz)   LMP 2022 (Approximate)   BMI 45.69 kg/m²         EXAM:     Prenatal Vitals  BP: 110/70  Weight: (!) 144 kg (318 lb 6.4 oz)              Urine Glucose Read-only: Negative  Urine Protein Read-only: Negative       Assessment and Plan    Problem List Items Addressed This Visit        Gravid and     Rh negative, antepartum    Overview     Status post RhoGam on 2021 for spotting         Delivery of pregnancy by  section - Primary    Overview  "    2022-out right  for history of shoulder dystocia. Baby girl \"Lakshmi\" weighing 8 pounds 6 ounces    Repeat scheduled on 8/15/2023 at 7:30 AM         Prenatal care, subsequent pregnancy    Obesity in pregnancy, antepartum    Overview     hgb a1c at NOB   early glucola 12 weeks-  Baby asa 12-36 weeks  U/s at 32/37 weeks for growth         Relevant Orders    Gestational Screen 1 Hr ([facility])    [id number] PLUS Core+SCA+ESS - Blood,    Short interval between pregnancies affecting pregnancy, antepartum   Other Visit Diagnoses     Encounter for supervision of other normal pregnancy in first trimester        Relevant Orders    POC Urinalysis Dipstick (Completed)    Gestational Screen 1 Hr ([facility])    [id number] PLUS Core+SCA+ESS - Blood,    Anxiety        Relevant Medications    sertraline (Zoloft) 50 MG tablet    Unable to hear fetal heart tones as reason for ultrasound scan        Relevant Orders    US Ob < 14 Weeks Single or First Gestation          1. Pregnancy at 12w0d  2. Labs reviewed from New OB Visit.  3. Early Glucola today.  4. Patient to start baby aspirin  5. Ultrasound today to confirm heart tones.  Unable to hear on Doppler secondary to body habitus.  6. Patient reports chest pain on  night.  We discussed going to the ER if she has chest pain.  She is relating it to anxiety as well.  We will start Zoloft today.  7. Counseled on genetic testing, carrier status and option for NT screen-patient having cell free DNA testing today.  8. Activity and Exercise discussed.  9. Patient is on Prenatal vitamins  Return in about 4 weeks (around 3/7/2023).    Adwoa Lawrence MD  2023    "

## 2023-02-07 NOTE — PROGRESS NOTES
"        OB FOLLOW UP  CC- Here for care of pregnancy        Sabra Smart is a 27 y.o.  12w0d patient being seen today for her obstetrical follow up visit. Patient reports {pregnancy complaints lexob:72660}.     Pt called and was seen in office on patient reports pelvic cramping and vaginal bleeding. Patient describes pelvic pain as aching. Patient rates pain a 4-5/10. Patient states pelvic pain began yesterday. Patient states that she is getting over a sinus infection and has been coughing frequently. Patient states that she felt like she had peed herself and went to the bathroom and noticed bright red blood with wiping. Patient states that she went to the bathroom 10 minutes minutes ago and vaginal spotting was light pink with wiping. Patient reports urinary frequency since pregnancy.     Her prenatal care is complicated by (and status) : {OB problems:45991}  Patient Active Problem List   Diagnosis   • Vaginal delivery   • History of ectopic pregnancy   • Rh negative, antepartum   • Delivery of pregnancy by  section   • Prenatal care, subsequent pregnancy   • Obesity in pregnancy, antepartum   • Short interval between pregnancies affecting pregnancy, antepartum       Desires genetic testing?: {Genetic Testing?:92055}  Flu Status: {Current Flu Status:77557}  Ultrasound Today: {Responses; yes/no (default no):48070}    ROS -   Patient Reports : {OB Symptoms:02893}  Patient Denies: {OBSymptoms:03963}  Fetal Movement : {desc; normal/decreased:81218}  All other systems reviewed and are negative.     The additional following portions of the patient's history were reviewed and updated as appropriate: {history reviewed:::\"allergies\",\"current medications\",\"past family history\",\"past medical history\",\"past social history\",\"past surgical history\",\"problem list\"}.    I have reviewed and agree with the HPI, ROS, and historical information as entered above. Ana Rosa Anna RN    LMP 2022 " "(Approximate)         EXAM:                            Assessment and Plan    Problem List Items Addressed This Visit        Gravid and     Rh negative, antepartum    Overview     Status post RhoGam on 2021 for spotting         Delivery of pregnancy by  section - Primary    Overview     2022-out right  for history of shoulder dystocia. Baby girl \"Lakshmi\" weighing 8 pounds 6 ounces    Repeat scheduled on 8/15/2023 at 7:30 AM         Prenatal care, subsequent pregnancy    Obesity in pregnancy, antepartum    Overview     hgb a1c at [facility]   early glucola 12 weeks-  Baby asa 12-36 weeks  U/s at 32/37 weeks for growth         Short interval between pregnancies affecting pregnancy, antepartum   Other Visit Diagnoses     Encounter for supervision of other normal pregnancy in first trimester        Relevant Orders    POC Urinalysis Dipstick          1. Pregnancy at 12w0d  2. Labs reviewed from New OB Visit.  3. Counseled on genetic testing, carrier status and option for NT screen  4. Activity and Exercise discussed.  5. {pregnancy plan 12wks lexob:53083::\"Patient is on Prenatal vitamins\"}  6. No follow-ups on file.    Ana Rosa Anna RN  2023  "

## 2023-03-02 DIAGNOSIS — F41.9 ANXIETY: ICD-10-CM

## 2023-03-07 ENCOUNTER — ROUTINE PRENATAL (OUTPATIENT)
Dept: OBSTETRICS AND GYNECOLOGY | Facility: CLINIC | Age: 28
End: 2023-03-07
Payer: COMMERCIAL

## 2023-03-07 VITALS — SYSTOLIC BLOOD PRESSURE: 122 MMHG | DIASTOLIC BLOOD PRESSURE: 74 MMHG | BODY MASS INDEX: 45.71 KG/M2 | WEIGHT: 293 LBS

## 2023-03-07 DIAGNOSIS — Z34.90 PRENATAL CARE, ANTEPARTUM: ICD-10-CM

## 2023-03-07 DIAGNOSIS — O26.899 RH NEGATIVE, ANTEPARTUM: ICD-10-CM

## 2023-03-07 DIAGNOSIS — O09.899 SHORT INTERVAL BETWEEN PREGNANCIES AFFECTING PREGNANCY, ANTEPARTUM: ICD-10-CM

## 2023-03-07 DIAGNOSIS — O99.210 OBESITY IN PREGNANCY, ANTEPARTUM: ICD-10-CM

## 2023-03-07 DIAGNOSIS — Z67.91 RH NEGATIVE, ANTEPARTUM: ICD-10-CM

## 2023-03-07 DIAGNOSIS — Z34.80 PRENATAL CARE OF MULTIGRAVIDA, ANTEPARTUM: ICD-10-CM

## 2023-03-07 LAB
EXPIRATION DATE: 0
GLUCOSE UR STRIP-MCNC: NEGATIVE MG/DL
Lab: 0
PROT UR STRIP-MCNC: NEGATIVE MG/DL

## 2023-03-07 PROCEDURE — 0502F SUBSEQUENT PRENATAL CARE: CPT | Performed by: OBSTETRICS & GYNECOLOGY

## 2023-03-07 NOTE — PROGRESS NOTES
"      OB FOLLOW UP  CC- Here for care of pregnancy        Sabra Smart is a 27 y.o.  16w0d patient being seen today for her obstetrical follow up visit. Patient reports no complaints.     Her prenatal care is complicated by (and status) :   Patient Active Problem List   Diagnosis   • Vaginal delivery   • History of ectopic pregnancy   • Rh negative, antepartum   • Delivery of pregnancy by  section   • Prenatal care, subsequent pregnancy   • Obesity in pregnancy, antepartum   • Short interval between pregnancies affecting pregnancy, antepartum   • Anxiety       Ultrasound Today: No    AFP: desires    ROS -   Patient Denies: Loss of Fluid, Vaginal Spotting, Vision Changes, Headaches, Nausea , Vomiting , Contractions and Epigastric pain  Fetal Movement : normal  All other systems reviewed and are negative.       The additional following portions of the patient's history were reviewed and updated as appropriate: allergies and current medications.    I have reviewed and agree with the HPI, ROS, and historical information as entered above. Adwoa Lawrence MD        EXAM:     Prenatal Vitals  BP: 122/74  Weight: (!) 145 kg (318 lb 9.6 oz)   Fetal Heart Rate: 141   Pelvic Exam:        Urine Glucose Read-only: Negative  Urine Protein Read-only: Negative           Assessment and Plan    Problem List Items Addressed This Visit        Gravid and     Rh negative, antepartum    Overview     Status post RhoGam on 2021 for spotting         Delivery of pregnancy by  section - Primary    Overview     2022-out right  for history of shoulder dystocia. Baby girl \"Lakshmi\" weighing 8 pounds 6 ounces    Repeat scheduled on 8/15/2023 at 7:30 AM         Relevant Orders    US Ob 14 + Weeks Single or First Gestation    Prenatal care, subsequent pregnancy    Obesity in pregnancy, antepartum    Overview     hgb a1c at [facility]   early glucola 12 weeks-100  Baby asa 12-36 weeks  U/s at " 32/37 weeks for growth         Relevant Orders    US Ob 14 + Weeks Single or First Gestation    Short interval between pregnancies affecting pregnancy, antepartum   Other Visit Diagnoses     Prenatal care, antepartum        Relevant Orders    POC Glucose, Urine, Qualitative, Dipstick (Completed)    POC Protein, Urine, Qualitative, Dipstick (Completed)    Alpha Fetoprotein, Maternal          1. Pregnancy at 16w0d  2. Fetal status reassuring.   3. Counseled on MSAFP alone in relation to OTD and placental issues.    4. Anatomy scan next visit.   5. Activity and Exercise discussed.  6. Patient is on Prenatal vitamins  Return in about 4 weeks (around 4/4/2023) for anatomy usg.    Adwoa Lawrence MD  03/07/2023

## 2023-03-10 LAB
AFP INTERP SERPL-IMP: NORMAL
AFP INTERP SERPL-IMP: NORMAL
AFP MOM SERPL: 1.72
AFP SERPL-MCNC: 38.9 NG/ML
AGE AT DELIVERY: 25.9 YR
GA METHOD: NORMAL
GA: 16 WEEKS
IDDM PATIENT QL: NO
LABORATORY COMMENT REPORT: NORMAL
MULTIPLE PREGNANCY: NO
NEURAL TUBE DEFECT RISK FETUS: 1551 %
RESULT: NORMAL

## 2023-04-03 ENCOUNTER — ROUTINE PRENATAL (OUTPATIENT)
Dept: OBSTETRICS AND GYNECOLOGY | Facility: CLINIC | Age: 28
End: 2023-04-03
Payer: COMMERCIAL

## 2023-04-03 VITALS — WEIGHT: 293 LBS | DIASTOLIC BLOOD PRESSURE: 70 MMHG | SYSTOLIC BLOOD PRESSURE: 122 MMHG | BODY MASS INDEX: 46 KG/M2

## 2023-04-03 DIAGNOSIS — O99.210 OBESITY IN PREGNANCY, ANTEPARTUM: ICD-10-CM

## 2023-04-03 DIAGNOSIS — Z34.92 PRENATAL CARE IN SECOND TRIMESTER: Primary | ICD-10-CM

## 2023-04-03 LAB
GLUCOSE UR STRIP-MCNC: NEGATIVE MG/DL
PROT UR STRIP-MCNC: NEGATIVE MG/DL

## 2023-04-03 PROCEDURE — 0502F SUBSEQUENT PRENATAL CARE: CPT | Performed by: NURSE PRACTITIONER

## 2023-04-03 PROCEDURE — 81002 URINALYSIS NONAUTO W/O SCOPE: CPT | Performed by: NURSE PRACTITIONER

## 2023-04-03 NOTE — PROGRESS NOTES
OB FOLLOW UP  CC- Here for care of pregnancy        Sabra Smart is a 27 y.o.  19w6d patient being seen today for her obstetrical follow up visit. Patient reports headache. That is relieved by Tylenol or rest. Patient states that she went to [facility] ER on 3/30/2023 for a stomach virus and severe abdominal pain. She was given IV fluids. She states that she has been feeling much better since.     Her prenatal care is complicated by (and status) : None  Patient Active Problem List   Diagnosis   • Vaginal delivery   • History of ectopic pregnancy   • Rh negative, antepartum   • Delivery of pregnancy by  section   • Prenatal care, subsequent pregnancy   • Obesity in pregnancy, antepartum   • Short interval between pregnancies affecting pregnancy, antepartum   • Anxiety       Flu Status: Declines  Ultrasound Today: Yes    ROS -   Patient Reports : Headaches   Patient Denies: Loss of Fluid, Vaginal Spotting, Vision Changes and Headaches  Fetal Movement : normal  All other systems reviewed and are negative.       The additional following portions of the patient's history were reviewed and updated as appropriate: allergies, current medications, past family history, past medical history, past social history, past surgical history and problem list.    I have reviewed and agree with the HPI, ROS, and historical information as entered above. Chanda Quintero, APRN    /70   Wt (!) 145 kg (320 lb 9.6 oz)   LMP 2022 (Approximate)   BMI 46.00 kg/m²       EXAM:     Prenatal Vitals  BP: 122/70  Weight: (!) 145 kg (320 lb 9.6 oz)   Fetal Heart Rate: 147          Urine Glucose Read-only: Negative  Urine Protein Read-only: Negative       Assessment and Plan    Problem List Items Addressed This Visit        Gravid and     Obesity in pregnancy, antepartum    Overview     hgb a1c at [facility]   early glucola 12 weeks-100  Baby asa 12-36 weeks  U/s at 32/37 weeks for growth          Relevant Orders    US Ob Follow Up Transabdominal Approach   Other Visit Diagnoses     Prenatal care in second trimester    -  Primary    Relevant Orders    POC Urinalysis Dipstick (Completed)    US Ob Follow Up Transabdominal Approach          1. Pregnancy at 19w6d  2. Anatomy scan today is incomplete, follow up in 4 weeks for additional views. Anatomy that was visualized was within normal limits.  3. Fetal status reassuring.   4. Activity and Exercise discussed.  Return in about 4 weeks (around 5/1/2023) for Ultrasound JNTAYLOR.    Chanda Quintero, APRN  04/03/2023

## 2023-04-06 ENCOUNTER — TELEPHONE (OUTPATIENT)
Dept: OBSTETRICS AND GYNECOLOGY | Facility: CLINIC | Age: 28
End: 2023-04-06

## 2023-04-06 NOTE — TELEPHONE ENCOUNTER
PROVIDER: DR. SHEIKH  CALLBACK FOR NENO    Caller: Sabra Smart    Relationship: Self    Best call back number: [phone]    What is the best time to reach you: ANY    Who are you requesting to speak with (clinical staff, provider,  specific staff member): RETURNING NENO'S CALL    Do you know the name of the person who called: NENO    What was the call regarding: PAPERWORK    Do you require a callback: YES

## 2023-05-02 ENCOUNTER — ROUTINE PRENATAL (OUTPATIENT)
Dept: OBSTETRICS AND GYNECOLOGY | Facility: CLINIC | Age: 28
End: 2023-05-02
Payer: COMMERCIAL

## 2023-05-02 VITALS — SYSTOLIC BLOOD PRESSURE: 112 MMHG | WEIGHT: 293 LBS | BODY MASS INDEX: 45.94 KG/M2 | DIASTOLIC BLOOD PRESSURE: 70 MMHG

## 2023-05-02 DIAGNOSIS — O09.899 SHORT INTERVAL BETWEEN PREGNANCIES AFFECTING PREGNANCY, ANTEPARTUM: ICD-10-CM

## 2023-05-02 DIAGNOSIS — Z34.80 PRENATAL CARE OF MULTIGRAVIDA, ANTEPARTUM: ICD-10-CM

## 2023-05-02 DIAGNOSIS — Z67.91 RH NEGATIVE, ANTEPARTUM: Primary | ICD-10-CM

## 2023-05-02 DIAGNOSIS — O35.BXX0 ECHOGENIC FOCUS OF HEART OF FETUS AFFECTING ANTEPARTUM CARE OF MOTHER, SINGLE OR UNSPECIFIED FETUS: ICD-10-CM

## 2023-05-02 DIAGNOSIS — Z34.90 PRENATAL CARE, ANTEPARTUM: ICD-10-CM

## 2023-05-02 DIAGNOSIS — Q27.0 TWO VESSEL CORD: ICD-10-CM

## 2023-05-02 DIAGNOSIS — O26.899 RH NEGATIVE, ANTEPARTUM: Primary | ICD-10-CM

## 2023-05-02 DIAGNOSIS — O99.210 OBESITY IN PREGNANCY, ANTEPARTUM: ICD-10-CM

## 2023-05-02 LAB
GLUCOSE UR STRIP-MCNC: NEGATIVE MG/DL
PROT UR STRIP-MCNC: NEGATIVE MG/DL

## 2023-05-02 NOTE — PROGRESS NOTES
OB FOLLOW UP  CC- Here for care of pregnancy        Sabra Smart is a 27 y.o.  24w0d patient being seen today for her obstetrical follow up visit. Patient reports intermittent sharp pain in lower left side of abdomen which lasts 1 hr each time for about 2 weeks. Pt states she feels lightheaded, sweaty and hot and feels like her blood sugar is low. Pt states she has this feeling every other day for the past 2-3 weeks. Pt states she checked her blood sugar during one episode of lightheadedness and it was 65. Pt states she does snack throughout the day.     Her prenatal care is complicated by (and status) :   Patient Active Problem List   Diagnosis   • Vaginal delivery   • History of ectopic pregnancy   • Rh negative, antepartum   • Delivery of pregnancy by  section   • Prenatal care, subsequent pregnancy   • Obesity in pregnancy, antepartum   • Short interval between pregnancies affecting pregnancy, antepartum   • Anxiety   • Echogenic focus of heart of fetus affecting antepartum care of mother   • Two vessel cord     US done today: Yes.  Findings showed Male fetus in cephalic presentation with fetal heart rate of 145.  Placenta is anterior and two-vessel cord noted.  Normal fluid volume.  Estimated fetal weight 1 pound 11 ounces which is 64th percentile.  Kidney seen today are normal.  Spine still suboptimally visualized.  EIF seen in the left ventricle as well..  I have personally evaluated the U/S and agree with the findings. Adwoa Lawrence MD    ROS -   Patient Denies: Loss of Fluid, Vaginal Spotting, Vision Changes, Headaches, Nausea , Vomiting  and Epigastric pain  Fetal Movement : normal  All other systems reviewed and are negative.       The additional following portions of the patient's history were reviewed and updated as appropriate: allergies and current medications.    I have reviewed and agree with the HPI, ROS, and historical information as entered above. Adwoa Jaffe  "MD Melinda    /70   Wt (!) 145 kg (320 lb 3.2 oz)   LMP 2022 (Approximate)   BMI 45.94 kg/m²       EXAM:     Prenatal Vitals  BP: 112/70  Weight: (!) 145 kg (320 lb 3.2 oz)   Fetal Heart Rate: 145               Urine Glucose Read-only: Negative  Urine Protein Read-only: Negative       Assessment and Plan    Problem List Items Addressed This Visit        Gravid and     Rh negative, antepartum - Primary    Overview     Status post RhoGam on 2021 for spotting         Delivery of pregnancy by  section    Overview     2022-out right  for history of shoulder dystocia. Baby girl \"Lakshmi\" weighing 8 pounds 6 ounces    Repeat scheduled on 8/15/2023 at 7:30 AM         Prenatal care, subsequent pregnancy    Obesity in pregnancy, antepartum    Overview     hgb a1c at [facility]   early glucola 12 weeks-100  Baby asa 12-36 weeks  U/s at 32/37 weeks for growth         Relevant Orders    [facility]    Short interval between pregnancies affecting pregnancy, antepartum    Relevant Orders    [facility]    Echogenic focus of heart of fetus affecting antepartum care of mother    Relevant Orders    [facility]    Two vessel cord    Overview     We will get growth ultrasounds at 32 and 37 weeks.         Relevant Orders    [facility]   Other Visit Diagnoses     Prenatal care, antepartum        Relevant Orders    POC Urinalysis Dipstick (Completed)          1. Pregnancy at 24w0d  2. Fetal status reassuring.  3. anatomy scan incomplete.  4. 1 hour gtt, CBC, Antibody screen, TDAP and Rhogam  next visit. Instructions given  5. Referral to PDC Ordered  6. Discussed/encouraged TDAP vaccination after 28 weeks  7. Activity and Exercise discussed.  Return in about 3 weeks (around 2023) for PDC same day, glucola.    Adwoa Lawrence MD  2023  "

## 2023-05-23 ENCOUNTER — HOSPITAL ENCOUNTER (OUTPATIENT)
Dept: WOMENS IMAGING | Facility: HOSPITAL | Age: 28
Discharge: HOME | End: 2023-05-23
Admitting: OBSTETRICS & GYNECOLOGY
Payer: COMMERCIAL

## 2023-05-23 ENCOUNTER — OFFICE VISIT (OUTPATIENT)
Dept: OBSTETRICS AND GYNECOLOGY | Facility: HOSPITAL | Age: 28
End: 2023-05-23
Payer: COMMERCIAL

## 2023-05-23 ENCOUNTER — ROUTINE PRENATAL (OUTPATIENT)
Dept: OBSTETRICS AND GYNECOLOGY | Facility: CLINIC | Age: 28
End: 2023-05-23
Payer: COMMERCIAL

## 2023-05-23 VITALS — WEIGHT: 293 LBS | SYSTOLIC BLOOD PRESSURE: 114 MMHG | BODY MASS INDEX: 46.49 KG/M2 | DIASTOLIC BLOOD PRESSURE: 71 MMHG

## 2023-05-23 VITALS — DIASTOLIC BLOOD PRESSURE: 72 MMHG | WEIGHT: 293 LBS | BODY MASS INDEX: 46.69 KG/M2 | SYSTOLIC BLOOD PRESSURE: 110 MMHG

## 2023-05-23 DIAGNOSIS — O35.BXX0 ECHOGENIC FOCUS OF HEART OF FETUS AFFECTING ANTEPARTUM CARE OF MOTHER, SINGLE OR UNSPECIFIED FETUS: ICD-10-CM

## 2023-05-23 DIAGNOSIS — R35.0 URINE FREQUENCY: ICD-10-CM

## 2023-05-23 DIAGNOSIS — Q27.0 TWO VESSEL CORD: ICD-10-CM

## 2023-05-23 DIAGNOSIS — O26.899 RH NEGATIVE, ANTEPARTUM: ICD-10-CM

## 2023-05-23 DIAGNOSIS — O34.219 PREVIOUS CESAREAN DELIVERY AFFECTING PREGNANCY: ICD-10-CM

## 2023-05-23 DIAGNOSIS — R30.0 BURNING WITH URINATION: ICD-10-CM

## 2023-05-23 DIAGNOSIS — F41.9 ANXIETY: ICD-10-CM

## 2023-05-23 DIAGNOSIS — Z34.90 PREGNANCY, UNSPECIFIED GESTATIONAL AGE: ICD-10-CM

## 2023-05-23 DIAGNOSIS — O09.899 SHORT INTERVAL BETWEEN PREGNANCIES AFFECTING PREGNANCY, ANTEPARTUM: ICD-10-CM

## 2023-05-23 DIAGNOSIS — Z67.91 RH NEGATIVE, ANTEPARTUM: ICD-10-CM

## 2023-05-23 DIAGNOSIS — Z34.90 PRENATAL CARE, ANTEPARTUM: Primary | ICD-10-CM

## 2023-05-23 DIAGNOSIS — E66.01 MORBID OBESITY WITH BMI OF 45.0-49.9, ADULT: ICD-10-CM

## 2023-05-23 DIAGNOSIS — Q27.0 TWO VESSEL CORD: Primary | ICD-10-CM

## 2023-05-23 DIAGNOSIS — O99.210 OBESITY IN PREGNANCY, ANTEPARTUM: ICD-10-CM

## 2023-05-23 DIAGNOSIS — O23.40 URINARY TRACT INFECTION IN MOTHER DURING PREGNANCY, ANTEPARTUM: ICD-10-CM

## 2023-05-23 PROBLEM — O40.9XX0 POLYHYDRAMNIOS, ANTEPARTUM: Status: ACTIVE | Noted: 2023-05-23

## 2023-05-23 LAB
BILIRUB BLD-MCNC: NEGATIVE MG/DL
CLARITY, POC: CLEAR
COLOR UR: ABNORMAL
GLUCOSE UR STRIP-MCNC: NEGATIVE MG/DL
GLUCOSE UR STRIP-MCNC: NEGATIVE MG/DL
KETONES UR QL: NEGATIVE
LEUKOCYTE EST, POC: ABNORMAL
NITRITE UR-MCNC: NEGATIVE MG/ML
PH UR: 6 [PH] (ref 5–8)
PROT UR STRIP-MCNC: ABNORMAL MG/DL
PROT UR STRIP-MCNC: NEGATIVE MG/DL
RBC # UR STRIP: ABNORMAL /UL
SP GR UR: 1.02 (ref 1–1.03)
UROBILINOGEN UR QL: NORMAL

## 2023-05-23 PROCEDURE — 76811 OB US DETAILED SNGL FETUS: CPT

## 2023-05-23 RX ORDER — NITROFURANTOIN 25; 75 MG/1; MG/1
100 CAPSULE ORAL 2 TIMES DAILY
Qty: 14 CAPSULE | Refills: 0 | Status: SHIPPED | OUTPATIENT
Start: 2023-05-23 | End: 2023-05-30

## 2023-05-23 NOTE — LETTER
May 23, 2023     Adwoa Lawrence MD  [address] [facility] [address] [place] [id number]    Patient: Sabra Smart   YOB: 1995   Date of Visit: 2023       Dear Adwoa Lawrence MD,    Thank you for referring Sabra Smart to me for evaluation. Below is a copy of my consult note.    If you have questions, please do not hesitate to call me. I look forward to following Sabra along with you.         Sincerely,        Douglas A. Milligan, MD        CC: No Recipients    Pt denies lof, vag bleeding, or cramping. KAYCE Duran Sees OB today.    Documentation of the ultrasound findings, images, and interpretations will be available in the patient's Viewpoint report which is located in the imaging tab in chart review.        Maternal/Fetal Medicine Consult Note     Name: Sabra Smart    : 1995     MRN: [id number]     Referring Provider: Adwoa Lawrence MD    Chief Complaint  EIF    Subjective     History of Present Illness:  Sabra Smart is a 27 y.o.  27w0d who presents today for EIF, 2VC    KEIRA: Estimated Date of Delivery: 23     ROS:   As noted in HPI.     Past Medical History:   Diagnosis Date   • Anxiety and depression    • Ectopic pregnancy    • History of COVID-19 2022   • Morbid obesity with BMI of 40.0-44.9, adult    •  (spontaneous vaginal delivery)       Past Surgical History:   Procedure Laterality Date   •  SECTION N/A 2022    Procedure:  SECTION PRIMARY history of shoulder dystocia;  Surgeon: Adwoa Lawrence MD;  Location: [facility] LABOR DELIVERY;  Service: Obstetrics/Gynecology;  Laterality: N/A;   • EAR TUBES     • KNEE ARTHROSCOPY Right 2020   • MOUTH SURGERY      frenulum clipped as a child   • TONSILLECTOMY     • WISDOM TOOTH EXTRACTION        OB History          4    Para   2    Term   2       0    AB   1    Living   2         SAB   0    IAB   0    Ectopic   1    Molar   0     "Multiple   0    Live Births   2                Objective     Vital Signs  /71   Wt (!) 147 kg (324 lb)   LMP 2022 (Approximate)   Estimated body mass index is 46.49 kg/m² as calculated from the following:    Height as of 22: 177.8 cm (70\").    Weight as of this encounter: 147 kg (324 lb).    Physical Exam    Ultrasound Impression:   See viewpoint  No EIF seen today.  Absent right umbilical artery.    Assessment and Plan     Sabra Smart is a 27 y.o.  27w0d who presents today for EIF, 2VC    Diagnoses and all orders for this visit:    1. Two vessel cord (Primary)  Assessment & Plan:  Single umbilical artery (SUA) is the result of atrophy or agenesis of one of the two umbilical arteries.  The incidence of a single umbilical artery is 0.25 to 1% of all reece pregnancies and up to 4.6% of twin gestations.  An isolated SUA with no other structural or chromosomal abnormalities should be distinguished from an SUA that is present with other abnormalities.  The rate of associated fetal structural anomalies when a single umbilical artery was detected is reported to range from 13 to 56%.  The most common associated anomalies to occur are in the renal, cardiovascular, gastrointestinal and central nervous systems.      If the ultrasound reveals only a single umbilical artery and no other anomalies, some studies have suggested an increased risk for intrauterine growth retardation.  As such, it is reasonable in the setting of a single umbilical artery to repeat ultrasonographic evaluation at 32 weeks' gestation for assessment of the fetal growth and proceed with increased  testing if fetal growth lag is detected.  Should the single umbilical artery be associated with other fetal anomalies, then consideration of echocardiogram and/or chromosomal analysis should be entertained.  Clearly the  staff should be informed of the single umbilical artery such that a detailed physical " exam can be performed of the  to rule out its association with an anomaly sequence or complex, specifically VATER complex, Meckel-Jefferson or Zellweger syndrome.      Per the ACOG Committee Opinion on an outpatient  surveillance from 2021, increased  testing should begin at 36 weeks 0 days gestation and continue weekly for the presence of an isolated single umbilical artery.     We will scan the patient again in 6 weeks to assess fetal growth.      2. Echogenic focus of heart of fetus affecting antepartum care of mother, single or unspecified fetus  Assessment & Plan:  An EIF is defined as a small (<6 mm) echogenic area in either cardiac ventricle that is as bright as the surrounding bone and visualized in at least 2 separate planes.  EIFs may appear in either cardiac ventricle, although left-sided EIFs are more common and are thought to represent microcalcifications of papillary muscles.  Since the first descriptions of EIFs as a soft marker for trisomy 21, a subsequent large body of literature has demonstrated varying positive likelihood ratios (LR) for trisomy 21, depending on the population studied (low-risk vs high-risk) and whether the EIF was isolated or in combination with other soft markers.  Overall, isolated EIFs have a positive LR ranging between 1.4 and 1.8 with a lower confidence interval extending to or lower than 1, suggesting a minimal risk.      I discussed how an echogenic intracardiac focus is not a true birth defect as it may be seen in ~ 1/25 normal  fetuses and up to ~ 1/10  fetuses. These women should be offered noninvasive screening with an MSAFP quad screen at the appropriate gestational age or cell-free fetal DNA evaluation.  For pregnant people with a negative serum or cfDNA screening results and an isolated EIF, ACOG and SMFM currently recommend no further evaluation, as this finding is a normal variant of no clinical importance with no  indication for fetal echocardiography, follow-up ultrasound imaging or  evaluation ([id number]).      No EIF seen today.  Fetal heart appears structurally normal.      3. Pregnancy, unspecified gestational age    4. Previous  delivery affecting pregnancy    5. Morbid obesity with BMI of 45.0-49.9, adult       Follow Up  Return in about 6 weeks (around 2023).    I spent 15 minutes caring for the patient on the day of service. This included: obtaining or reviewing a separately obtained medical history, reviewing patient records, performing a medically appropriate exam and/or evaluation, counseling or educating the patient/family/caregiver, ordering medications, labs, and/or procedures and documenting such in the medical record. This does not include time spent on review and interpretation of other tests such as fetal ultrasound or the performance of other procedures such as amniocentesis or CVS.      Douglas A. Milligan, MD  Maternal Fetal Medicine, [facility]     2023

## 2023-05-23 NOTE — ASSESSMENT & PLAN NOTE
Single umbilical artery (SUA) is the result of atrophy or agenesis of one of the two umbilical arteries.  The incidence of a single umbilical artery is 0.25 to 1% of all reece pregnancies and up to 4.6% of twin gestations.  An isolated SUA with no other structural or chromosomal abnormalities should be distinguished from an SUA that is present with other abnormalities.  The rate of associated fetal structural anomalies when a single umbilical artery was detected is reported to range from 13 to 56%.  The most common associated anomalies to occur are in the renal, cardiovascular, gastrointestinal and central nervous systems.      If the ultrasound reveals only a single umbilical artery and no other anomalies, some studies have suggested an increased risk for intrauterine growth retardation.  As such, it is reasonable in the setting of a single umbilical artery to repeat ultrasonographic evaluation at 32 weeks' gestation for assessment of the fetal growth and proceed with increased  testing if fetal growth lag is detected.  Should the single umbilical artery be associated with other fetal anomalies, then consideration of echocardiogram and/or chromosomal analysis should be entertained.  Clearly the  staff should be informed of the single umbilical artery such that a detailed physical exam can be performed of the  to rule out its association with an anomaly sequence or complex, specifically VATER complex, Meckel-Jefferson or Zellweger syndrome.      Per the ACOG Committee Opinion on an outpatient  surveillance from 2021, increased  testing should begin at 36 weeks 0 days gestation and continue weekly for the presence of an isolated single umbilical artery.     We will scan the patient again in 6 weeks to assess fetal growth.

## 2023-05-23 NOTE — ASSESSMENT & PLAN NOTE
An EIF is defined as a small (<6 mm) echogenic area in either cardiac ventricle that is as bright as the surrounding bone and visualized in at least 2 separate planes.  EIFs may appear in either cardiac ventricle, although left-sided EIFs are more common and are thought to represent microcalcifications of papillary muscles.  Since the first descriptions of EIFs as a soft marker for trisomy 21, a subsequent large body of literature has demonstrated varying positive likelihood ratios (LR) for trisomy 21, depending on the population studied (low-risk vs high-risk) and whether the EIF was isolated or in combination with other soft markers.  Overall, isolated EIFs have a positive LR ranging between 1.4 and 1.8 with a lower confidence interval extending to or lower than 1, suggesting a minimal risk.      I discussed how an echogenic intracardiac focus is not a true birth defect as it may be seen in ~ 1/25 normal  fetuses and up to ~ 1/10  fetuses. These women should be offered noninvasive screening with an MSAFP quad screen at the appropriate gestational age or cell-free fetal DNA evaluation.  For pregnant people with a negative serum or cfDNA screening results and an isolated EIF, ACOG and SMFM currently recommend no further evaluation, as this finding is a normal variant of no clinical importance with no indication for fetal echocardiography, follow-up ultrasound imaging or  evaluation ([id number]).      No EIF seen today.  Fetal heart appears structurally normal.

## 2023-05-23 NOTE — PROGRESS NOTES
"Documentation of the ultrasound findings, images, and interpretations will be available in the patient's Viewpoint report which is located in the imaging tab in chart review.        Maternal/Fetal Medicine Consult Note     Name: Sabra Smart    : 1995     MRN: [id number]     Referring Provider: Adwoa Lawrence MD    Chief Complaint  EIF    Subjective     History of Present Illness:  Sabra Smart is a 27 y.o.  27w0d who presents today for EIF, 2VC    KEIRA: Estimated Date of Delivery: 23     ROS:   As noted in HPI.     Past Medical History:   Diagnosis Date   • Anxiety and depression    • Ectopic pregnancy    • History of COVID-19 2022   • Morbid obesity with BMI of 40.0-44.9, adult    •  (spontaneous vaginal delivery)       Past Surgical History:   Procedure Laterality Date   •  SECTION N/A 2022    Procedure:  SECTION PRIMARY history of shoulder dystocia;  Surgeon: Adwoa Lawrence MD;  Location: [facility] LABOR DELIVERY;  Service: Obstetrics/Gynecology;  Laterality: N/A;   • EAR TUBES     • KNEE ARTHROSCOPY Right 2020   • MOUTH SURGERY      frenulum clipped as a child   • TONSILLECTOMY     • WISDOM TOOTH EXTRACTION        OB History        4    Para   2    Term   2       0    AB   1    Living   2       SAB   0    IAB   0    Ectopic   1    Molar   0    Multiple   0    Live Births   2                Objective     Vital Signs  /71   Wt (!) 147 kg (324 lb)   LMP 2022 (Approximate)   Estimated body mass index is 46.49 kg/m² as calculated from the following:    Height as of 22: 177.8 cm (70\").    Weight as of this encounter: 147 kg (324 lb).    Physical Exam    Ultrasound Impression:   See viewpoint  No EIF seen today.  Absent right umbilical artery.    Assessment and Plan     Sabra Smart is a 27 y.o.  27w0d who presents today for EIF, 2VC    Diagnoses and all orders for this visit:    1. Two " vessel cord (Primary)  Assessment & Plan:  Single umbilical artery (SUA) is the result of atrophy or agenesis of one of the two umbilical arteries.  The incidence of a single umbilical artery is 0.25 to 1% of all reece pregnancies and up to 4.6% of twin gestations.  An isolated SUA with no other structural or chromosomal abnormalities should be distinguished from an SUA that is present with other abnormalities.  The rate of associated fetal structural anomalies when a single umbilical artery was detected is reported to range from 13 to 56%.  The most common associated anomalies to occur are in the renal, cardiovascular, gastrointestinal and central nervous systems.      If the ultrasound reveals only a single umbilical artery and no other anomalies, some studies have suggested an increased risk for intrauterine growth retardation.  As such, it is reasonable in the setting of a single umbilical artery to repeat ultrasonographic evaluation at 32 weeks' gestation for assessment of the fetal growth and proceed with increased  testing if fetal growth lag is detected.  Should the single umbilical artery be associated with other fetal anomalies, then consideration of echocardiogram and/or chromosomal analysis should be entertained.  Clearly the  staff should be informed of the single umbilical artery such that a detailed physical exam can be performed of the  to rule out its association with an anomaly sequence or complex, specifically VATER complex, Meckel-Jefferson or Zellweger syndrome.      Per the ACOG Committee Opinion on an outpatient  surveillance from 2021, increased  testing should begin at 36 weeks 0 days gestation and continue weekly for the presence of an isolated single umbilical artery.     We will scan the patient again in 6 weeks to assess fetal growth.      2. Echogenic focus of heart of fetus affecting antepartum care of mother, single or unspecified  fetus  Assessment & Plan:  An EIF is defined as a small (<6 mm) echogenic area in either cardiac ventricle that is as bright as the surrounding bone and visualized in at least 2 separate planes.  EIFs may appear in either cardiac ventricle, although left-sided EIFs are more common and are thought to represent microcalcifications of papillary muscles.  Since the first descriptions of EIFs as a soft marker for trisomy 21, a subsequent large body of literature has demonstrated varying positive likelihood ratios (LR) for trisomy 21, depending on the population studied (low-risk vs high-risk) and whether the EIF was isolated or in combination with other soft markers.  Overall, isolated EIFs have a positive LR ranging between 1.4 and 1.8 with a lower confidence interval extending to or lower than 1, suggesting a minimal risk.      I discussed how an echogenic intracardiac focus is not a true birth defect as it may be seen in ~ 1/25 normal  fetuses and up to ~ 1/10  fetuses. These women should be offered noninvasive screening with an MSAFP quad screen at the appropriate gestational age or cell-free fetal DNA evaluation.  For pregnant people with a negative serum or cfDNA screening results and an isolated EIF, ACOG and SMFM currently recommend no further evaluation, as this finding is a normal variant of no clinical importance with no indication for fetal echocardiography, follow-up ultrasound imaging or  evaluation ([id number]).      No EIF seen today.  Fetal heart appears structurally normal.      3. Pregnancy, unspecified gestational age    4. Previous  delivery affecting pregnancy    5. Morbid obesity with BMI of 45.0-49.9, adult       Follow Up  Return in about 6 weeks (around 2023).    I spent 15 minutes caring for the patient on the day of service. This included: obtaining or reviewing a separately obtained medical history, reviewing patient records, performing a medically appropriate  exam and/or evaluation, counseling or educating the patient/family/caregiver, ordering medications, labs, and/or procedures and documenting such in the medical record. This does not include time spent on review and interpretation of other tests such as fetal ultrasound or the performance of other procedures such as amniocentesis or CVS.      Douglas A. Milligan, MD  Maternal Fetal Medicine, [facility]     2023

## 2023-05-23 NOTE — PROGRESS NOTES
OB FOLLOW UP  CC- Here for care of pregnancy        Sabra Smart is a 27 y.o.  27w0d patient being seen today for her obstetrical follow up. Patient reports swelling in her upper and lower extremities without edema. Patient states the swelling is when she is active and on her feet all day and is relieved with rest. Patient states she has been having urinary frequency and burning with urination that started 2 days ago. Patient denies lower pelvic and back pain. Patient states she has had glucose issues that started 3 weeks ago. Per [facility]NA she was advised to eat more frequently. Patient states she feels light headed when she doesn't eat. Patient denies nausea and vomiting. Patient states she doesn't have much of an appetite due to the zoloft.     Patient undergoing Glucola testing today. She is due for her testing at 10:37.       MBT: A-  Rhogam: will be given today.  28 week packet: reviewed with patient , counseled on fetal movement , pediatrician list reviewed, breast pump discussed and childbirth classes reviewed  TDAP: Too early today. Patient will receive next visit.   Ultrasound Today: Yes. PDC.     Her prenatal care is complicated by (and status) :    Patient Active Problem List   Diagnosis   • Vaginal delivery   • History of ectopic pregnancy   • Rh negative, antepartum   • Delivery of pregnancy by  section   • Prenatal care, subsequent pregnancy   • Obesity in pregnancy, antepartum   • Short interval between pregnancies affecting pregnancy, antepartum   • Anxiety   • Echogenic focus of heart of fetus affecting antepartum care of mother   • Two vessel cord   • Morbid obesity with BMI of 45.0-49.9, adult   • Polyhydramnios, antepartum         ROS -   Patient Reports : Swelling, urinary frequency and burning with urination  Patient Denies: Loss of Fluid, Vaginal Spotting, Vision Changes, Headaches, Nausea , Vomiting , Contractions and Epigastric pain  Fetal Movement : normal    The  "additional following portions of the patient's history were reviewed and updated as appropriate: allergies, current medications, past family history, past medical history, past social history, past surgical history and problem list.    I have reviewed and agree with the HPI, ROS, and historical information as entered above. Adwoa Lawrence MD    /72   Wt (!) 148 kg (325 lb 6.4 oz)   LMP 2022 (Approximate)   BMI 46.69 kg/m²         EXAM:     Prenatal Vitals  BP: 110/72  Weight: (!) 148 kg (325 lb 6.4 oz)   Fetal Heart Rate: pdc                Urine Glucose Read-only: Negative  Urine Protein Read-only: (!) 1+       Assessment and Plan    Problem List Items Addressed This Visit        Gravid and     Rh negative, antepartum    Overview     Status post RhoGam on 2021 for spotting         Relevant Orders    Rhogam Immune Globulin Immunization (Completed)    Delivery of pregnancy by  section    Overview     2022-out right  for history of shoulder dystocia. Baby girl \"Lakshmi\" weighing 8 pounds 6 ounces    Repeat scheduled on 8/15/2023 at 7:30 AM         Echogenic focus of heart of fetus affecting antepartum care of mother    Overview     Genetic testing normal         Two vessel cord    Overview     We will get growth ultrasounds at 32 and 37 weeks.            Mental Health    Anxiety    Overview     Patient to start Zoloft at 12 weeks.         Relevant Medications    sertraline (Zoloft) 50 MG tablet   Other Visit Diagnoses     Prenatal care, antepartum    -  Primary    Relevant Orders    POC Urinalysis Dipstick (Completed)    CBC (No Diff)    Gestational Screen 1 Hr ([facility])    Antibody Screen    Rhogam Immune Globulin Immunization (Completed)    POC Urinalysis Dipstick (Completed)    Urine frequency        Relevant Orders    Urine Culture - Urine, Urine, Clean Catch    Burning with urination        Relevant Orders    Urine Culture - Urine, Urine, Clean Catch    " Urinary tract infection in mother during pregnancy, antepartum        Relevant Medications    nitrofurantoin, macrocrystal-monohydrate, (Macrobid) 100 MG capsule          1. Pregnancy at 27w0d  2. 1 hr Glucola, CBC, and antibody screen today  and Rhogam today  3. Fetal movement/PTL or Labor precautions  4. Activity and Exercise discussed.  5. We will treat for UTI today as patient has hematuria and leukocyte Estrace.  Await culture to ensure Macrobid is susceptible  6. Polyhydramnios noted on ultrasound today.  Return in about 4 weeks (around 6/20/2023).    Adwoa Lawrence MD  05/23/2023

## 2023-05-24 LAB
BLD GP AB SCN SERPL QL: NEGATIVE
ERYTHROCYTE [DISTWIDTH] IN BLOOD BY AUTOMATED COUNT: 14.1 % (ref 11.7–15.4)
GLUCOSE 1H P 50 G GLC PO SERPL-MCNC: 103 MG/DL (ref 70–139)
HCT VFR BLD AUTO: 36.1 % (ref 34–46.6)
HGB BLD-MCNC: 11.5 G/DL (ref 11.1–15.9)
MCH RBC QN AUTO: 27.1 PG (ref 26.6–33)
MCHC RBC AUTO-ENTMCNC: 31.9 G/DL (ref 31.5–35.7)
MCV RBC AUTO: 85 FL (ref 79–97)
PLATELET # BLD AUTO: 252 X10E3/UL (ref 150–450)
RBC # BLD AUTO: 4.24 X10E6/UL (ref 3.77–5.28)
WBC # BLD AUTO: 7.5 X10E3/UL (ref 3.4–10.8)

## 2023-05-26 LAB
BACTERIA UR CULT: ABNORMAL
BACTERIA UR CULT: ABNORMAL
OTHER ANTIBIOTIC SUSC ISLT: ABNORMAL

## 2023-05-31 DIAGNOSIS — O23.40 URINARY TRACT INFECTION IN MOTHER DURING PREGNANCY, ANTEPARTUM: Primary | ICD-10-CM

## 2023-05-31 RX ORDER — NITROFURANTOIN 25; 75 MG/1; MG/1
100 CAPSULE ORAL 2 TIMES DAILY
Qty: 10 CAPSULE | Refills: 0 | Status: SHIPPED | OUTPATIENT
Start: 2023-05-31 | End: 2023-06-05

## 2023-06-08 ENCOUNTER — TELEPHONE (OUTPATIENT)
Dept: OBSTETRICS AND GYNECOLOGY | Facility: CLINIC | Age: 28
End: 2023-06-08
Payer: COMMERCIAL

## 2023-06-08 ENCOUNTER — ROUTINE PRENATAL (OUTPATIENT)
Dept: OBSTETRICS AND GYNECOLOGY | Facility: CLINIC | Age: 28
End: 2023-06-08
Payer: COMMERCIAL

## 2023-06-08 VITALS — SYSTOLIC BLOOD PRESSURE: 124 MMHG | BODY MASS INDEX: 46.86 KG/M2 | DIASTOLIC BLOOD PRESSURE: 80 MMHG | WEIGHT: 293 LBS

## 2023-06-08 DIAGNOSIS — Z34.93 PRENATAL CARE IN THIRD TRIMESTER: Primary | ICD-10-CM

## 2023-06-08 DIAGNOSIS — O35.BXX0 ECHOGENIC FOCUS OF HEART OF FETUS AFFECTING ANTEPARTUM CARE OF MOTHER, SINGLE OR UNSPECIFIED FETUS: ICD-10-CM

## 2023-06-08 DIAGNOSIS — O40.9XX0 POLYHYDRAMNIOS, ANTEPARTUM, SINGLE OR UNSPECIFIED FETUS: ICD-10-CM

## 2023-06-08 DIAGNOSIS — Q27.0 TWO VESSEL CORD: ICD-10-CM

## 2023-06-08 DIAGNOSIS — O99.210 OBESITY IN PREGNANCY, ANTEPARTUM: ICD-10-CM

## 2023-06-08 DIAGNOSIS — Z67.91 RH NEGATIVE, ANTEPARTUM: ICD-10-CM

## 2023-06-08 DIAGNOSIS — O09.899 SHORT INTERVAL BETWEEN PREGNANCIES AFFECTING PREGNANCY, ANTEPARTUM: ICD-10-CM

## 2023-06-08 DIAGNOSIS — E66.01 MORBID OBESITY WITH BMI OF 45.0-49.9, ADULT: ICD-10-CM

## 2023-06-08 DIAGNOSIS — Z34.83 PRENATAL CARE, SUBSEQUENT PREGNANCY IN THIRD TRIMESTER: ICD-10-CM

## 2023-06-08 DIAGNOSIS — O26.899 RH NEGATIVE, ANTEPARTUM: ICD-10-CM

## 2023-06-08 LAB
EXPIRATION DATE: ABNORMAL
GLUCOSE UR STRIP-MCNC: NEGATIVE MG/DL
Lab: ABNORMAL
PROT UR STRIP-MCNC: ABNORMAL MG/DL

## 2023-06-08 NOTE — PROGRESS NOTES
OB FOLLOW UP  CC- Here for care of pregnancy        Sabra Smart is a 27 y.o.  29w2d patient being seen today for elevated home BP and DFM.     States hasn't felt any fetal movement since 0930 today, even after eating and lying down. States she hasn't felt normal past 2 days. Has H/A unrelieved with Tylenol and feeling lightheaded. Has mild pedal edema that improves with rest. Denies any RUQ/epigastric pain, visual changes or other problems. /89 @ home today.     She reports that she began to feel fetal movement on drive here, and also several while hooking to NST.     Her prenatal care is complicated by (and status) :    Patient Active Problem List   Diagnosis    Vaginal delivery    History of ectopic pregnancy    Rh negative, antepartum    Delivery of pregnancy by  section    Prenatal care, subsequent pregnancy    Obesity in pregnancy, antepartum    Short interval between pregnancies affecting pregnancy, antepartum    Anxiety    Echogenic focus of heart of fetus affecting antepartum care of mother    Two vessel cord    Morbid obesity with BMI of 45.0-49.9, adult    Polyhydramnios, antepartum     NST non-reassuring, difficult to get a good tracing for the needed time and due to maternal body habitus    Ultrasound Today: BPP 8/8, ESTRELLA 18.9    ROS -   Patient Reports : DFM, elevated home BP, swelling and headaches  Patient Denies: Loss of Fluid, Vaginal Spotting, Vision Changes, Nausea , Vomiting , Contractions, and Epigastric pain  Fetal Movement : decreased  All other systems reviewed and are negative.       The additional following portions of the patient's history were reviewed and updated as appropriate: allergies, current medications, past family history, past medical history, past social history, past surgical history, and problem list.    I have reviewed and agree with the HPI, ROS, and historical information as entered above. Oscar Wells, APRN      /80   Wt  "(!) 148 kg (326 lb 9.6 oz)   LMP 2022 (Approximate)   BMI 46.86 kg/m²       EXAM:     Prenatal Vitals  BP: 124/80  Weight: (!) 148 kg (326 lb 9.6 oz)   Fetal Heart Rate: 155            Assessment and Plan    Problem List Items Addressed This Visit          Endocrine and Metabolic    Morbid obesity with BMI of 45.0-49.9, adult       Gravid and     Rh negative, antepartum    Overview     Status post RhoGam on 2021 for spotting         Delivery of pregnancy by  section    Overview     2022-out right  for history of shoulder dystocia. Baby girl \"Lakshmi\" weighing 8 pounds 6 ounces    Repeat scheduled on 8/15/2023 at 7:30 AM         Prenatal care, subsequent pregnancy    Obesity in pregnancy, antepartum    Overview     hgb a1c at NOB   early glucola 12 weeks-100  Baby asa 12-36 weeks  U/s at 32/37 weeks for growth         Short interval between pregnancies affecting pregnancy, antepartum    Echogenic focus of heart of fetus affecting antepartum care of mother    Overview     Genetic testing normal         Relevant Orders    US Fetal Biophysical Profile;With Non-Stress Testing    Two vessel cord    Overview     We will get growth ultrasounds at 32 and 37 weeks.         Relevant Orders    US Fetal Biophysical Profile;With Non-Stress Testing    Polyhydramnios, antepartum    Overview     ESTRELLA of 21 at 27 weeks.         Relevant Orders    US Fetal Biophysical Profile;With Non-Stress Testing     Other Visit Diagnoses       Prenatal care in third trimester    -  Primary    Relevant Orders    POC Protein, Urine, Qualitative, Dipstick (Completed)    POC Glucose, Urine, Qualitative, Dipstick (Completed)            Pregnancy at 29w2d  Fetal status reassuring.   NST non reactive; BPP 8/8.  PIH precautions reviewed  Increase PO fluids  Return for regularly scheduled OB appointment.  RTC for worsening symptoms  Return in about 12 days (around 2023) for Keep Regularly Scheduled " Appointment.    ADDENDUM: Pt left office after BPP prior to being seen again. I called her on the phone to review results which were reassuring. Advised her on s/s preeclampsia, increased water intake, and HA management. She thinks she may have overdone it at the [place] and also feels her HA may be sinus related. Reviewed correct BP taking technique and advised that she does not need to routinely take it at home at this point unless she is having HA not improved with rest, hydration, medication, and caffeine, visual changes, RUQ pain or markedly increased swelling. Reviewed fetal movements should be 10 movements in 10 hours at this gestation. She v/u.    Oscar Wells, APRN  06/08/2023

## 2023-06-08 NOTE — TELEPHONE ENCOUNTER
Returned patient's call. 29w2d. States hasn't felt any fetal movement since 0930 today, even after eating and lying down. States she hasn't felt normal past 2 days. Has H/A unrelieved with Tylenol and feeling lightheaded. Has mild pedal edema that improves with rest. Denies any RUQ/epigastric pain, visual changes or other problems. /89 @ home today. Advised to come in to office to be evaluated. She v/u and agreed.

## 2023-06-08 NOTE — TELEPHONE ENCOUNTER
Ob patient is calling because she hasn't felt baby move all day. Patient states she has been light headed the last couple days. Patient states she did pass out a few days ago thinking it was her blood sugar.

## 2023-06-19 ENCOUNTER — ROUTINE PRENATAL (OUTPATIENT)
Dept: OBSTETRICS AND GYNECOLOGY | Facility: CLINIC | Age: 28
End: 2023-06-19
Payer: COMMERCIAL

## 2023-06-19 VITALS — DIASTOLIC BLOOD PRESSURE: 72 MMHG | SYSTOLIC BLOOD PRESSURE: 118 MMHG | BODY MASS INDEX: 47.58 KG/M2 | WEIGHT: 293 LBS

## 2023-06-19 DIAGNOSIS — O40.9XX0 POLYHYDRAMNIOS, ANTEPARTUM, SINGLE OR UNSPECIFIED FETUS: ICD-10-CM

## 2023-06-19 DIAGNOSIS — O09.899 SHORT INTERVAL BETWEEN PREGNANCIES AFFECTING PREGNANCY, ANTEPARTUM: ICD-10-CM

## 2023-06-19 DIAGNOSIS — B37.9 YEAST INFECTION: ICD-10-CM

## 2023-06-19 DIAGNOSIS — Q27.0 TWO VESSEL CORD: ICD-10-CM

## 2023-06-19 DIAGNOSIS — O35.BXX0 ECHOGENIC FOCUS OF HEART OF FETUS AFFECTING ANTEPARTUM CARE OF MOTHER, SINGLE OR UNSPECIFIED FETUS: ICD-10-CM

## 2023-06-19 DIAGNOSIS — O99.210 OBESITY IN PREGNANCY, ANTEPARTUM: ICD-10-CM

## 2023-06-19 DIAGNOSIS — Z67.91 RH NEGATIVE, ANTEPARTUM: ICD-10-CM

## 2023-06-19 DIAGNOSIS — Z34.93 PRENATAL CARE IN THIRD TRIMESTER: Primary | ICD-10-CM

## 2023-06-19 DIAGNOSIS — O23.40 URINARY TRACT INFECTION IN MOTHER DURING PREGNANCY, ANTEPARTUM: ICD-10-CM

## 2023-06-19 DIAGNOSIS — N89.8 VAGINAL ITCHING: ICD-10-CM

## 2023-06-19 DIAGNOSIS — O26.899 RH NEGATIVE, ANTEPARTUM: ICD-10-CM

## 2023-06-19 LAB
BILIRUB BLD-MCNC: NEGATIVE MG/DL
CLARITY, POC: CLEAR
COLOR UR: YELLOW
GLUCOSE UR STRIP-MCNC: NEGATIVE MG/DL
KETONES UR QL: NEGATIVE
LEUKOCYTE EST, POC: ABNORMAL
NITRITE UR-MCNC: NEGATIVE MG/ML
PH UR: 6.5 [PH] (ref 5–8)
PROT UR STRIP-MCNC: NEGATIVE MG/DL
RBC # UR STRIP: NEGATIVE /UL
SP GR UR: 1.02 (ref 1–1.03)
UROBILINOGEN UR QL: ABNORMAL

## 2023-06-19 PROCEDURE — 90715 TDAP VACCINE 7 YRS/> IM: CPT | Performed by: OBSTETRICS & GYNECOLOGY

## 2023-06-19 PROCEDURE — 90471 IMMUNIZATION ADMIN: CPT | Performed by: OBSTETRICS & GYNECOLOGY

## 2023-06-19 PROCEDURE — 0502F SUBSEQUENT PRENATAL CARE: CPT | Performed by: OBSTETRICS & GYNECOLOGY

## 2023-06-19 RX ORDER — NYSTATIN 100000 [USP'U]/G
POWDER TOPICAL 3 TIMES DAILY
Qty: 15 G | Refills: 0 | Status: SHIPPED | OUTPATIENT
Start: 2023-06-19

## 2023-06-21 LAB
A VAGINAE DNA VAG QL NAA+PROBE: ABNORMAL SCORE
BACTERIA UR CULT: NORMAL
BACTERIA UR CULT: NORMAL
BVAB2 DNA VAG QL NAA+PROBE: ABNORMAL SCORE
C ALBICANS DNA VAG QL NAA+PROBE: POSITIVE
C GLABRATA DNA VAG QL NAA+PROBE: NEGATIVE
MEGA1 DNA VAG QL NAA+PROBE: ABNORMAL SCORE

## 2023-07-05 PROBLEM — N76.0 ACUTE VAGINITIS: Status: ACTIVE | Noted: 2023-07-05

## 2023-07-26 ENCOUNTER — LAB (OUTPATIENT)
Dept: LAB | Facility: HOSPITAL | Age: 28
End: 2023-07-26
Payer: COMMERCIAL

## 2023-07-26 ENCOUNTER — PREP FOR SURGERY (OUTPATIENT)
Dept: OTHER | Facility: HOSPITAL | Age: 28
End: 2023-07-26
Payer: COMMERCIAL

## 2023-07-26 ENCOUNTER — ROUTINE PRENATAL (OUTPATIENT)
Dept: OBSTETRICS AND GYNECOLOGY | Facility: CLINIC | Age: 28
End: 2023-07-26
Payer: COMMERCIAL

## 2023-07-26 VITALS — BODY MASS INDEX: 47.35 KG/M2 | DIASTOLIC BLOOD PRESSURE: 70 MMHG | SYSTOLIC BLOOD PRESSURE: 112 MMHG | WEIGHT: 293 LBS

## 2023-07-26 DIAGNOSIS — O99.210 OBESITY IN PREGNANCY, ANTEPARTUM: ICD-10-CM

## 2023-07-26 DIAGNOSIS — Z34.80 PRENATAL CARE OF MULTIGRAVIDA, ANTEPARTUM: Primary | ICD-10-CM

## 2023-07-26 DIAGNOSIS — O09.899 SHORT INTERVAL BETWEEN PREGNANCIES AFFECTING PREGNANCY, ANTEPARTUM: ICD-10-CM

## 2023-07-26 DIAGNOSIS — O40.9XX0 POLYHYDRAMNIOS, ANTEPARTUM, SINGLE OR UNSPECIFIED FETUS: ICD-10-CM

## 2023-07-26 DIAGNOSIS — Q27.0 TWO VESSEL CORD: ICD-10-CM

## 2023-07-26 DIAGNOSIS — O26.899 RH NEGATIVE, ANTEPARTUM: ICD-10-CM

## 2023-07-26 DIAGNOSIS — O35.BXX0 ECHOGENIC FOCUS OF HEART OF FETUS AFFECTING ANTEPARTUM CARE OF MOTHER, SINGLE OR UNSPECIFIED FETUS: ICD-10-CM

## 2023-07-26 DIAGNOSIS — Z67.91 RH NEGATIVE, ANTEPARTUM: ICD-10-CM

## 2023-07-26 PROBLEM — Z34.90 PREGNANCY: Status: ACTIVE | Noted: 2023-07-26

## 2023-07-26 LAB
GLUCOSE UR STRIP-MCNC: NEGATIVE MG/DL
PROT UR STRIP-MCNC: NEGATIVE MG/DL

## 2023-07-26 PROCEDURE — 87081 CULTURE SCREEN ONLY: CPT

## 2023-07-26 RX ORDER — OXYTOCIN/0.9 % SODIUM CHLORIDE 30/500 ML
85 PLASTIC BAG, INJECTION (ML) INTRAVENOUS ONCE
OUTPATIENT
Start: 2023-07-26 | End: 2023-07-26

## 2023-07-26 RX ORDER — CEFAZOLIN SODIUM 2 G/100ML
2 INJECTION, SOLUTION INTRAVENOUS ONCE
OUTPATIENT
Start: 2023-07-26 | End: 2023-07-26

## 2023-07-26 RX ORDER — KETOROLAC TROMETHAMINE 30 MG/ML
30 INJECTION, SOLUTION INTRAMUSCULAR; INTRAVENOUS ONCE
OUTPATIENT
Start: 2023-07-26 | End: 2023-07-26

## 2023-07-26 RX ORDER — CARBOPROST TROMETHAMINE 250 UG/ML
250 INJECTION, SOLUTION INTRAMUSCULAR AS NEEDED
OUTPATIENT
Start: 2023-07-26

## 2023-07-26 RX ORDER — METHYLERGONOVINE MALEATE 0.2 MG/ML
200 INJECTION INTRAVENOUS ONCE AS NEEDED
OUTPATIENT
Start: 2023-07-26

## 2023-07-26 RX ORDER — OXYTOCIN/0.9 % SODIUM CHLORIDE 30/500 ML
650 PLASTIC BAG, INJECTION (ML) INTRAVENOUS ONCE
OUTPATIENT
Start: 2023-07-26 | End: 2023-07-26

## 2023-07-26 RX ORDER — SODIUM CHLORIDE, SODIUM LACTATE, POTASSIUM CHLORIDE, CALCIUM CHLORIDE 600; 310; 30; 20 MG/100ML; MG/100ML; MG/100ML; MG/100ML
125 INJECTION, SOLUTION INTRAVENOUS CONTINUOUS
OUTPATIENT
Start: 2023-07-26

## 2023-07-26 RX ORDER — MISOPROSTOL 200 UG/1
800 TABLET ORAL AS NEEDED
OUTPATIENT
Start: 2023-07-26

## 2023-07-26 RX ORDER — ACETAMINOPHEN 500 MG
1000 TABLET ORAL ONCE
OUTPATIENT
Start: 2023-07-26 | End: 2023-07-26

## 2023-07-26 RX ORDER — TRISODIUM CITRATE DIHYDRATE AND CITRIC ACID MONOHYDRATE 500; 334 MG/5ML; MG/5ML
30 SOLUTION ORAL ONCE
OUTPATIENT
Start: 2023-07-26 | End: 2023-07-26

## 2023-07-26 NOTE — PROGRESS NOTES
OB FOLLOW UP  CC- Here for care of pregnancy        Sabra Smart is a 27 y.o.  36w1d patient being seen today for her obstetrical follow up visit. Patient reports irregular contractions . HA's resolves with Tylenol/rest/hydration. Feels that hydration is a big factor.    Her prenatal care is complicated by (and status) :   Patient Active Problem List   Diagnosis    History of ectopic pregnancy    Rh negative, antepartum    Delivery of pregnancy by  section    Prenatal care, subsequent pregnancy    Obesity in pregnancy, antepartum    Short interval between pregnancies affecting pregnancy, antepartum    Anxiety    Echogenic focus of heart of fetus affecting antepartum care of mother    Two vessel cord    Morbid obesity with BMI of 45.0-49.9, adult    Polyhydramnios, antepartum    Acute vaginitis    Pregnancy       GBS Status: Done Today. She is not allergic to PCN.    No Known Allergies         Her Delivery Plan is: Repeat . Scheduled  23   today: no  Non Stress Test: No.    ROS -   Patient Reports : Headaches and Contractions  Patient Denies: Loss of Fluid, Vaginal Spotting, and Vision Changes  Fetal Movement : normal  All other systems reviewed and are negative.       The additional following portions of the patient's history were reviewed and updated as appropriate: allergies, current medications, past family history, past medical history, past social history, past surgical history, and problem list.    I have reviewed and agree with the HPI, ROS, and historical information as entered above. Adwoa Lawrence MD        EXAM:     Prenatal Vitals  BP: 112/70  Weight: (!) 150 kg (330 lb)   Fetal Heart Rate: 145   Fundal Height (cm): 39 cm          Urine Glucose Read-only: Negative  Urine Protein Read-only: Negative           Assessment and Plan    Problem List Items Addressed This Visit          Gravid and     Rh negative, antepartum    Overview     Rhogam  5/23/23         Prenatal care, subsequent pregnancy - Primary    Relevant Orders    Strep B Screen - Swab, Vaginal/Rectum    POC Urinalysis Dipstick (Completed)    Obesity in pregnancy, antepartum    Overview     hgb a1c at NOB   early glucola 12 weeks-100  Baby asa 12-36 weeks  U/s at 32/37 weeks for growth         Relevant Orders    US Ob Follow Up Transabdominal Approach    US Fetal Biophysical Profile;Without Non-Stress Testing    Short interval between pregnancies affecting pregnancy, antepartum    Echogenic focus of heart of fetus affecting antepartum care of mother    Overview     Genetic testing normal         Two vessel cord    Overview     We will get growth ultrasounds at 32 and 37 weeks.         Relevant Orders    US Ob Follow Up Transabdominal Approach    US Fetal Biophysical Profile;Without Non-Stress Testing    Polyhydramnios, antepartum    Overview     ESTRELLA of 21 at 27 weeks.  ESTRELLA 15.8 at 33 weeks.         Relevant Orders    US Ob Follow Up Transabdominal Approach    US Fetal Biophysical Profile;Without Non-Stress Testing       Pregnancy at 36w1d  Fetal status reassuring.   Reviewed Pre-eclampsia signs/symptoms  Delivery options reviewed with patient  Signs of labor reviewed  Kick counts reviewed  Activity and Exercise discussed.  Return in about 1 week (around 8/2/2023) for ultrasound.    Adwoa Lawrence MD  07/26/2023

## 2023-07-29 LAB — BACTERIA SPEC AEROBE CULT: NORMAL

## 2023-08-04 ENCOUNTER — ROUTINE PRENATAL (OUTPATIENT)
Dept: OBSTETRICS AND GYNECOLOGY | Facility: CLINIC | Age: 28
End: 2023-08-04
Payer: COMMERCIAL

## 2023-08-04 VITALS — DIASTOLIC BLOOD PRESSURE: 78 MMHG | SYSTOLIC BLOOD PRESSURE: 118 MMHG | BODY MASS INDEX: 48.3 KG/M2 | WEIGHT: 293 LBS

## 2023-08-04 DIAGNOSIS — Z34.93 THIRD TRIMESTER PREGNANCY: Primary | ICD-10-CM

## 2023-08-04 LAB
EXPIRATION DATE: 0
GLUCOSE UR STRIP-MCNC: NEGATIVE MG/DL
Lab: 0
PROT UR STRIP-MCNC: NEGATIVE MG/DL

## 2023-08-09 ENCOUNTER — ROUTINE PRENATAL (OUTPATIENT)
Dept: OBSTETRICS AND GYNECOLOGY | Facility: CLINIC | Age: 28
End: 2023-08-09
Payer: COMMERCIAL

## 2023-08-09 VITALS — DIASTOLIC BLOOD PRESSURE: 78 MMHG | WEIGHT: 293 LBS | SYSTOLIC BLOOD PRESSURE: 126 MMHG | BODY MASS INDEX: 48.53 KG/M2

## 2023-08-09 DIAGNOSIS — O09.899 SHORT INTERVAL BETWEEN PREGNANCIES AFFECTING PREGNANCY, ANTEPARTUM: ICD-10-CM

## 2023-08-09 DIAGNOSIS — O99.210 OBESITY IN PREGNANCY, ANTEPARTUM: ICD-10-CM

## 2023-08-09 DIAGNOSIS — Z67.91 RH NEGATIVE, ANTEPARTUM: ICD-10-CM

## 2023-08-09 DIAGNOSIS — Q27.0 TWO VESSEL CORD: ICD-10-CM

## 2023-08-09 DIAGNOSIS — O35.BXX0 ECHOGENIC FOCUS OF HEART OF FETUS AFFECTING ANTEPARTUM CARE OF MOTHER, SINGLE OR UNSPECIFIED FETUS: ICD-10-CM

## 2023-08-09 DIAGNOSIS — O26.899 RH NEGATIVE, ANTEPARTUM: ICD-10-CM

## 2023-08-09 DIAGNOSIS — O40.9XX0 POLYHYDRAMNIOS, ANTEPARTUM, SINGLE OR UNSPECIFIED FETUS: ICD-10-CM

## 2023-08-09 DIAGNOSIS — Z34.80 PRENATAL CARE OF MULTIGRAVIDA, ANTEPARTUM: Primary | ICD-10-CM

## 2023-08-09 LAB
GLUCOSE UR STRIP-MCNC: NEGATIVE MG/DL
PROT UR STRIP-MCNC: NEGATIVE MG/DL

## 2023-08-09 NOTE — PROGRESS NOTES
OB FOLLOW UP  CC- Here for care of pregnancy        Sabra Smart is a 27 y.o.  38w1d patient being seen today for her obstetrical follow up visit. Patient reports occasional irregular cramping and contractions.     Her prenatal care is complicated by (and status) :   Patient Active Problem List   Diagnosis    History of ectopic pregnancy    Rh negative, antepartum    Delivery of pregnancy by  section    Prenatal care, subsequent pregnancy    Obesity in pregnancy, antepartum    Short interval between pregnancies affecting pregnancy, antepartum    Anxiety    Echogenic focus of heart of fetus affecting antepartum care of mother    Two vessel cord    Morbid obesity with BMI of 45.0-49.9, adult    Polyhydramnios, antepartum    Acute vaginitis    Pregnancy       GBS Status:   Group B Strep Culture   Date Value Ref Range Status   2023 No Group B Streptococcus isolated  Final         No Known Allergies       Her Delivery Plan is: Repeat . Scheduled  23 at 0730  US today: no  Non Stress Test: No.    ROS -   Patient Denies: Loss of Fluid, Vaginal Spotting, Vision Changes, Nausea , Vomiting , and Epigastric pain  Fetal Movement : normal  All other systems reviewed and are negative.       The additional following portions of the patient's history were reviewed and updated as appropriate: allergies, current medications, past family history, past medical history, past social history, past surgical history, and problem list.    I have reviewed and agree with the HPI, ROS, and historical information as entered above. Adwoa Lawrence MD        EXAM:     Prenatal Vitals  BP: 126/78  Weight: (!) 153 kg (338 lb 3.2 oz)   Fetal Heart Rate: 138   Fundal Height (cm): 39 cm          Urine Glucose Read-only: Negative  Urine Protein Read-only: Negative           Assessment and Plan    Problem List Items Addressed This Visit          Gravid and     Rh negative, antepartum     "Overview     Rhogam 23         Delivery of pregnancy by  section    Overview     2022-out right  for history of shoulder dystocia. Baby girl \"Lakshmi\" weighing 8 pounds 6 ounces    Repeat scheduled on 8/15/2023 at 7:30 AM-orders in         Prenatal care, subsequent pregnancy - Primary    Relevant Orders    POC Urinalysis Dipstick (Completed)    Obesity in pregnancy, antepartum    Overview     hgb a1c at NOB   early glucola 12 weeks-100  Baby asa 12-36 weeks  U/s at 32/37 weeks for growth         Short interval between pregnancies affecting pregnancy, antepartum    Echogenic focus of heart of fetus affecting antepartum care of mother    Overview     Genetic testing normal         Two vessel cord    Overview     We will get growth ultrasounds at 32 and 37 weeks.         Polyhydramnios, antepartum    Overview     ESTRELLA of 21 at 27 weeks.  ESTRELLA 15.8 at 33 weeks.            Pregnancy at 38w1d  Fetal status reassuring.   Reviewed Pre-eclampsia signs/symptoms  Reviewed upcoming c/s and PAT instructions with patient. Arrival time and NPO status reviewed.   Delivery options reviewed with patient  Signs of labor reviewed  Kick counts reviewed  Activity and Exercise discussed.  Return in about 3 weeks (around 2023) for NP incision check.    Adwoa Lawrence MD  2023   "

## 2023-08-14 ENCOUNTER — TELEPHONE (OUTPATIENT)
Dept: OBSTETRICS AND GYNECOLOGY | Facility: CLINIC | Age: 28
End: 2023-08-14

## 2023-08-14 ENCOUNTER — ROUTINE PRENATAL (OUTPATIENT)
Dept: OBSTETRICS AND GYNECOLOGY | Facility: CLINIC | Age: 28
End: 2023-08-14
Payer: COMMERCIAL

## 2023-08-14 VITALS — SYSTOLIC BLOOD PRESSURE: 124 MMHG | WEIGHT: 293 LBS | BODY MASS INDEX: 48.61 KG/M2 | DIASTOLIC BLOOD PRESSURE: 80 MMHG

## 2023-08-14 DIAGNOSIS — O40.9XX0 POLYHYDRAMNIOS, ANTEPARTUM, SINGLE OR UNSPECIFIED FETUS: ICD-10-CM

## 2023-08-14 DIAGNOSIS — F41.9 ANXIETY: ICD-10-CM

## 2023-08-14 DIAGNOSIS — Q27.0 TWO VESSEL CORD: Primary | ICD-10-CM

## 2023-08-14 NOTE — TELEPHONE ENCOUNTER
Discussed with Dr. Lawrence. She would like patient to come to office now for evaluation. Patient v/u and agreed.

## 2023-08-14 NOTE — PROGRESS NOTES
OB FOLLOW UP  CC- Here for care of pregnancy        Sabra Smart is a 27 y.o.  38w6d patient being seen today for abdominal pain.    Patient states has not noticed much fetal movement since early this morning. Reports only feeling three movements all day. Started having intermittent mild to moderate pain around 1pm over entire abdomen that lasts 40-58 seconds. States uterus gets tight but also has sharp pain the the center of abdomen that radiates all around. Pain was constant and severe from 1:30 until 2:15 but denies any pain now. Denies any bleeding or leaking fluid.        Her prenatal care is complicated by (and status) :    Patient Active Problem List   Diagnosis    History of ectopic pregnancy    Rh negative, antepartum    Delivery of pregnancy by  section    Prenatal care, subsequent pregnancy    Obesity in pregnancy, antepartum    Short interval between pregnancies affecting pregnancy, antepartum    Anxiety    Echogenic focus of heart of fetus affecting antepartum care of mother    Two vessel cord    Morbid obesity with BMI of 45.0-49.9, adult    Polyhydramnios, antepartum    Acute vaginitis    Pregnancy         Ultrasound Today: No.  Non Stress Test: Yes minutes 20  non-stress test: NST: Reactive  indication:  Decreased fetal movement, r/o contractions      ROS -   Patient Reports :  abdominal pain/contractions, decreased fetal movement   Patient Denies: Loss of Fluid, Vaginal Spotting, Vision Changes, Headaches, Nausea , Vomiting , and Epigastric pain  Fetal Movement : decreased  All other systems reviewed and are negative.       The additional following portions of the patient's history were reviewed and updated as appropriate: allergies, current medications, past family history, past medical history, past social history, past surgical history, and problem list.    I have reviewed and agree with the HPI, ROS, and historical information as entered above. Shala Victoria,  "APRN      /80   Wt (!) 154 kg (338 lb 12.8 oz)   LMP 2022 (Approximate)   BMI 48.61 kg/mý       EXAM:     Prenatal Vitals  BP: 124/80  Weight: (!) 154 kg (338 lb 12.8 oz)   Fetal Heart Rate: NST                Assessment and Plan    Problem List Items Addressed This Visit          Gravid and     Delivery of pregnancy by  section    Overview     2022-out right  for history of shoulder dystocia. Baby girl \"Lakshmi\" weighing 8 pounds 6 ounces    Repeat scheduled on 8/15/2023 at 7:30 AM-orders in         Two vessel cord - Primary    Overview     We will get growth ultrasounds at 32 and 37 weeks.         Polyhydramnios, antepartum    Overview     ESTRELLA of 21 at 27 weeks.  ESTRELLA 15.8 at 33 weeks.            Mental Health    Anxiety    Overview     Patient to start Zoloft at 12 weeks.         Relevant Medications    sertraline (Zoloft) 50 MG tablet       Pregnancy at 38w6d  Fetal status reassuring.   NST reactive today.  Labor precautions reviewed.  Increase PO fluids. Patient had not drank a lot of water today.  RTC for worsening symptoms  Signs of labor reviewed such as contractions five minutes apart getting closer together and stronger, decreased fetal movement, vaginal bleeding, or leaking of fluid. Reviewed Pre-eclampsia signs/symptoms-Headache not relieved by tylenol or rest, chest pain, shortness of breath, right upper quadrant pain, blurry vision-go to labor and delivery if office is closed or call office if open, patient verbalized understanding.   Encouraged the importance of +fm with 2 vessel cord, come in immediately if decreased again. VU.   LANDY tomorrow.    Shala Victoria, APRN  2023    "

## 2023-08-14 NOTE — TELEPHONE ENCOUNTER
Returned patient's call. 38w 6d. States has not noticed much fetal movement since early this morning. Started having intermittent mild to moderate pain around 1pm over entire abdomen that lasts 40-58 seconds. States uterus gets tight but also has sharp pain the the center of abdomen that radiates all around. Pain was constant and severe from 1:30 until 2:15 but is less now. Denies any bleeding or leaking fluid.

## 2023-08-15 ENCOUNTER — PRE-ADMISSION TESTING (OUTPATIENT)
Dept: PREADMISSION TESTING | Facility: HOSPITAL | Age: 28
End: 2023-08-15
Payer: COMMERCIAL

## 2023-08-15 VITALS — BODY MASS INDEX: 41.02 KG/M2 | HEIGHT: 71 IN | WEIGHT: 293 LBS

## 2023-08-15 DIAGNOSIS — O99.210 OBESITY IN PREGNANCY, ANTEPARTUM: ICD-10-CM

## 2023-08-15 LAB
ABO GROUP BLD: NORMAL
BLD GP AB SCN SERPL QL: NEGATIVE
DEPRECATED RDW RBC AUTO: 44.9 FL (ref 37–54)
ERYTHROCYTE [DISTWIDTH] IN BLOOD BY AUTOMATED COUNT: 14.6 % (ref 12.3–15.4)
HCT VFR BLD AUTO: 35.7 % (ref 34–46.6)
HGB BLD-MCNC: 11.7 G/DL (ref 12–15.9)
MCH RBC QN AUTO: 27.7 PG (ref 26.6–33)
MCHC RBC AUTO-ENTMCNC: 32.8 G/DL (ref 31.5–35.7)
MCV RBC AUTO: 84.6 FL (ref 79–97)
PLATELET # BLD AUTO: 203 10*3/MM3 (ref 140–450)
PMV BLD AUTO: 9.8 FL (ref 6–12)
RBC # BLD AUTO: 4.22 10*6/MM3 (ref 3.77–5.28)
RH BLD: NEGATIVE
T&S EXPIRATION DATE: NORMAL
WBC NRBC COR # BLD: 6.3 10*3/MM3 (ref 3.4–10.8)

## 2023-08-15 PROCEDURE — 85027 COMPLETE CBC AUTOMATED: CPT

## 2023-08-15 PROCEDURE — 36415 COLL VENOUS BLD VENIPUNCTURE: CPT

## 2023-08-15 PROCEDURE — 86901 BLOOD TYPING SEROLOGIC RH(D): CPT

## 2023-08-15 PROCEDURE — 86850 RBC ANTIBODY SCREEN: CPT

## 2023-08-15 PROCEDURE — 86900 BLOOD TYPING SEROLOGIC ABO: CPT

## 2023-08-15 NOTE — DISCHARGE INSTRUCTIONS
What to know before your arrive:    -Do not eat, drink or chew gum beginning 8 hours before your scheduled arrival time to the hospital.  Except please drink 20 ounces of Gatorade and complete two hours before your given arrival time to the hospital.  If you drink too close to surgery time, your sugery may  be delayed or cancelled.  Please complete as instructed.     -Do not shave any part of your body including abdomen or pelvic are for two days before your procedure.  -If you are taking a scheduled medication (insulin, blood pressure medicine,antibiotics) please consult with your physician whether to take on the day of surgery.  -Remove all jewelry including rings, wedding bands, and piercing before coming to the hospital.  -Leave important valuables at home.  -Do not wear dark fingernail polish.  -Please take two Tylenol 500 mg tablets the evening before surgery.  -Bring the following with you to the hospital:    -Picture ID and insurance, Medicare or Medicaid cards    -Co-pay/deductible required by insurance (Cash, Check, Credit Card)    -Copy of living will or power  document (if applicable)    -CPAP mask and tubing, not machine (if applicable)    -Skin prep instructions sheet    What to know the day of procedure:    -Park in the [facility], take elevator for first floor, exit to the right and  proceed through the doors to outside, follow the covered sidewalk to the entrance of the [place], follow the hallway and signs [facility], enter the [place] to your right BEFORE entering the 17[address] lobby.  Take the elevators to the 3rd floor ([address] [place]).  -Leave unnecessary items in your vehicle, including your suitcase.  Your support  person or a family member can get it for you after your procedure.   -Check in at the reception desk in the lobby of the 3rd floor ([address] [place]).   -One person may accompany you to the pre-op/recovery area.  Please have  other family members wait in the  waiting room.   -An anesthesiologist will meet with your prior to your procedure.   -After anesthesia has been initiated, one person may accompany you in the  operating room.   -No video cameras are permitted in the operating room; only still cameras,  Please.      What to expect while you are in recovery:     -One person may stay with you while you are in recovery.   -If the baby is stable, he/she may visit to initiate breastfeeding & [facility].      CHLORHEXIDINE GLUCONATE WIPES AND INSTRUCTIONS GIVEN TO PATIENT

## 2023-08-18 ENCOUNTER — HOSPITAL ENCOUNTER (INPATIENT)
Facility: HOSPITAL | Age: 28
LOS: 3 days | Discharge: HOME | End: 2023-08-21
Attending: OBSTETRICS & GYNECOLOGY | Admitting: OBSTETRICS & GYNECOLOGY
Payer: COMMERCIAL

## 2023-08-18 ENCOUNTER — ANESTHESIA (OUTPATIENT)
Dept: LABOR AND DELIVERY | Facility: HOSPITAL | Age: 28
End: 2023-08-18
Payer: COMMERCIAL

## 2023-08-18 ENCOUNTER — ANESTHESIA EVENT (OUTPATIENT)
Dept: LABOR AND DELIVERY | Facility: HOSPITAL | Age: 28
End: 2023-08-18
Payer: COMMERCIAL

## 2023-08-18 DIAGNOSIS — O99.210 OBESITY IN PREGNANCY, ANTEPARTUM: ICD-10-CM

## 2023-08-18 LAB
ABO GROUP BLD: NORMAL
AMPHET+METHAMPHET UR QL: NEGATIVE
AMPHETAMINES UR QL: NEGATIVE
BARBITURATES UR QL SCN: NEGATIVE
BENZODIAZ UR QL SCN: NEGATIVE
BUPRENORPHINE SERPL-MCNC: NEGATIVE NG/ML
CANNABINOIDS SERPL QL: NEGATIVE
COCAINE UR QL: NEGATIVE
FETAL BLEED: NEGATIVE
METHADONE UR QL SCN: NEGATIVE
NUMBER OF DOSES: NORMAL
OPIATES UR QL: NEGATIVE
OXYCODONE UR QL SCN: NEGATIVE
PCP UR QL SCN: NEGATIVE
PROPOXYPH UR QL: NEGATIVE
RH BLD: NEGATIVE
TRICYCLICS UR QL SCN: NEGATIVE

## 2023-08-18 PROCEDURE — 85461 HEMOGLOBIN FETAL: CPT | Performed by: OBSTETRICS & GYNECOLOGY

## 2023-08-18 PROCEDURE — 25010000002 CEFAZOLIN PER 500 MG: Performed by: OBSTETRICS & GYNECOLOGY

## 2023-08-18 PROCEDURE — 59515 CESAREAN DELIVERY: CPT | Performed by: OBSTETRICS & GYNECOLOGY

## 2023-08-18 PROCEDURE — C1765 ADHESION BARRIER: HCPCS | Performed by: OBSTETRICS & GYNECOLOGY

## 2023-08-18 PROCEDURE — 86901 BLOOD TYPING SEROLOGIC RH(D): CPT | Performed by: OBSTETRICS & GYNECOLOGY

## 2023-08-18 PROCEDURE — 25010000002 ENOXAPARIN PER 10 MG: Performed by: OBSTETRICS & GYNECOLOGY

## 2023-08-18 PROCEDURE — 25010000002 ONDANSETRON PER 1 MG: Performed by: NURSE ANESTHETIST, CERTIFIED REGISTERED

## 2023-08-18 PROCEDURE — 25010000002 FENTANYL CITRATE (PF) 50 MCG/ML SOLUTION: Performed by: NURSE ANESTHETIST, CERTIFIED REGISTERED

## 2023-08-18 PROCEDURE — 25010000002 MIDAZOLAM PER 1 MG: Performed by: NURSE ANESTHETIST, CERTIFIED REGISTERED

## 2023-08-18 PROCEDURE — 80306 DRUG TEST PRSMV INSTRMNT: CPT | Performed by: OBSTETRICS & GYNECOLOGY

## 2023-08-18 PROCEDURE — 59025 FETAL NON-STRESS TEST: CPT

## 2023-08-18 PROCEDURE — 0 MORPHINE PER 10 MG: Performed by: NURSE ANESTHETIST, CERTIFIED REGISTERED

## 2023-08-18 PROCEDURE — 25010000002 RHO D IMMUNE GLOBULIN 1500 UNIT/2ML SOLUTION PREFILLED SYRINGE: Performed by: OBSTETRICS & GYNECOLOGY

## 2023-08-18 PROCEDURE — 86900 BLOOD TYPING SEROLOGIC ABO: CPT | Performed by: OBSTETRICS & GYNECOLOGY

## 2023-08-18 PROCEDURE — 25010000002 OXYTOCIN PER 10 UNITS: Performed by: NURSE ANESTHETIST, CERTIFIED REGISTERED

## 2023-08-18 PROCEDURE — 25010000002 KETOROLAC TROMETHAMINE PER 15 MG: Performed by: OBSTETRICS & GYNECOLOGY

## 2023-08-18 DEVICE — ABSORBABLE ADHESION BARRIER
Type: IMPLANTABLE DEVICE | Site: UTERUS | Status: FUNCTIONAL
Brand: GYNECARE INTERCEED

## 2023-08-18 RX ORDER — ACETAMINOPHEN 325 MG/1
650 TABLET ORAL EVERY 6 HOURS
Status: DISCONTINUED | OUTPATIENT
Start: 2023-08-19 | End: 2023-08-21 | Stop reason: HOSPADM

## 2023-08-18 RX ORDER — ALUMINA, MAGNESIA, AND SIMETHICONE 2400; 2400; 240 MG/30ML; MG/30ML; MG/30ML
15 SUSPENSION ORAL EVERY 4 HOURS PRN
Status: DISCONTINUED | OUTPATIENT
Start: 2023-08-18 | End: 2023-08-21 | Stop reason: HOSPADM

## 2023-08-18 RX ORDER — SODIUM CHLORIDE, SODIUM LACTATE, POTASSIUM CHLORIDE, CALCIUM CHLORIDE 600; 310; 30; 20 MG/100ML; MG/100ML; MG/100ML; MG/100ML
125 INJECTION, SOLUTION INTRAVENOUS CONTINUOUS
Status: DISCONTINUED | OUTPATIENT
Start: 2023-08-18 | End: 2023-08-19

## 2023-08-18 RX ORDER — DOCUSATE SODIUM 100 MG/1
100 CAPSULE, LIQUID FILLED ORAL 2 TIMES DAILY PRN
Status: DISCONTINUED | OUTPATIENT
Start: 2023-08-18 | End: 2023-08-21 | Stop reason: HOSPADM

## 2023-08-18 RX ORDER — ONDANSETRON 2 MG/ML
INJECTION INTRAMUSCULAR; INTRAVENOUS AS NEEDED
Status: DISCONTINUED | OUTPATIENT
Start: 2023-08-18 | End: 2023-08-18 | Stop reason: SURG

## 2023-08-18 RX ORDER — ENOXAPARIN SODIUM 100 MG/ML
40 INJECTION SUBCUTANEOUS DAILY
Status: DISCONTINUED | OUTPATIENT
Start: 2023-08-18 | End: 2023-08-21 | Stop reason: HOSPADM

## 2023-08-18 RX ORDER — OXYTOCIN 10 [USP'U]/ML
INJECTION, SOLUTION INTRAMUSCULAR; INTRAVENOUS AS NEEDED
Status: DISCONTINUED | OUTPATIENT
Start: 2023-08-18 | End: 2023-08-18 | Stop reason: SURG

## 2023-08-18 RX ORDER — CALCIUM CARBONATE 500 MG/1
1 TABLET, CHEWABLE ORAL EVERY 4 HOURS PRN
Status: DISCONTINUED | OUTPATIENT
Start: 2023-08-18 | End: 2023-08-21 | Stop reason: HOSPADM

## 2023-08-18 RX ORDER — KETOROLAC TROMETHAMINE 15 MG/ML
15 INJECTION, SOLUTION INTRAMUSCULAR; INTRAVENOUS EVERY 6 HOURS
Status: COMPLETED | OUTPATIENT
Start: 2023-08-18 | End: 2023-08-19

## 2023-08-18 RX ORDER — DIPHENHYDRAMINE HYDROCHLORIDE 50 MG/ML
25 INJECTION INTRAMUSCULAR; INTRAVENOUS EVERY 4 HOURS PRN
Status: DISCONTINUED | OUTPATIENT
Start: 2023-08-18 | End: 2023-08-21 | Stop reason: HOSPADM

## 2023-08-18 RX ORDER — METHYLERGONOVINE MALEATE 0.2 MG/ML
200 INJECTION INTRAVENOUS ONCE AS NEEDED
Status: DISCONTINUED | OUTPATIENT
Start: 2023-08-18 | End: 2023-08-18 | Stop reason: HOSPADM

## 2023-08-18 RX ORDER — ACETAMINOPHEN 500 MG
1000 TABLET ORAL EVERY 6 HOURS
Status: COMPLETED | OUTPATIENT
Start: 2023-08-18 | End: 2023-08-19

## 2023-08-18 RX ORDER — FENTANYL CITRATE 50 UG/ML
50 INJECTION, SOLUTION INTRAMUSCULAR; INTRAVENOUS
Status: DISCONTINUED | OUTPATIENT
Start: 2023-08-18 | End: 2023-08-18 | Stop reason: HOSPADM

## 2023-08-18 RX ORDER — NALOXONE HCL 0.4 MG/ML
0.4 VIAL (ML) INJECTION
Status: ACTIVE | OUTPATIENT
Start: 2023-08-18 | End: 2023-08-19

## 2023-08-18 RX ORDER — MISOPROSTOL 200 UG/1
800 TABLET ORAL AS NEEDED
Status: DISCONTINUED | OUTPATIENT
Start: 2023-08-18 | End: 2023-08-18 | Stop reason: HOSPADM

## 2023-08-18 RX ORDER — PROMETHAZINE HYDROCHLORIDE 25 MG/1
25 TABLET ORAL EVERY 6 HOURS PRN
Status: DISCONTINUED | OUTPATIENT
Start: 2023-08-18 | End: 2023-08-21 | Stop reason: HOSPADM

## 2023-08-18 RX ORDER — SIMETHICONE 80 MG
80 TABLET,CHEWABLE ORAL 4 TIMES DAILY PRN
Status: DISCONTINUED | OUTPATIENT
Start: 2023-08-18 | End: 2023-08-21 | Stop reason: HOSPADM

## 2023-08-18 RX ORDER — SODIUM CHLORIDE 0.9 % (FLUSH) 0.9 %
10 SYRINGE (ML) INJECTION EVERY 12 HOURS SCHEDULED
Status: DISCONTINUED | OUTPATIENT
Start: 2023-08-18 | End: 2023-08-20

## 2023-08-18 RX ORDER — FENTANYL CITRATE 50 UG/ML
INJECTION, SOLUTION INTRAMUSCULAR; INTRAVENOUS AS NEEDED
Status: DISCONTINUED | OUTPATIENT
Start: 2023-08-18 | End: 2023-08-18 | Stop reason: SURG

## 2023-08-18 RX ORDER — PROMETHAZINE HYDROCHLORIDE 12.5 MG/1
12.5 SUPPOSITORY RECTAL EVERY 6 HOURS PRN
Status: DISCONTINUED | OUTPATIENT
Start: 2023-08-18 | End: 2023-08-21 | Stop reason: HOSPADM

## 2023-08-18 RX ORDER — IBUPROFEN 600 MG/1
600 TABLET ORAL EVERY 6 HOURS
Status: DISCONTINUED | OUTPATIENT
Start: 2023-08-19 | End: 2023-08-21 | Stop reason: HOSPADM

## 2023-08-18 RX ORDER — CITRIC ACID/SODIUM CITRATE 334-500MG
30 SOLUTION, ORAL ORAL ONCE
Status: COMPLETED | OUTPATIENT
Start: 2023-08-18 | End: 2023-08-18

## 2023-08-18 RX ORDER — ACETAMINOPHEN 500 MG
1000 TABLET ORAL ONCE
Status: COMPLETED | OUTPATIENT
Start: 2023-08-18 | End: 2023-08-18

## 2023-08-18 RX ORDER — CARBOPROST TROMETHAMINE 250 UG/ML
250 INJECTION, SOLUTION INTRAMUSCULAR AS NEEDED
Status: DISCONTINUED | OUTPATIENT
Start: 2023-08-18 | End: 2023-08-21 | Stop reason: HOSPADM

## 2023-08-18 RX ORDER — MIDAZOLAM HYDROCHLORIDE 1 MG/ML
INJECTION INTRAMUSCULAR; INTRAVENOUS AS NEEDED
Status: DISCONTINUED | OUTPATIENT
Start: 2023-08-18 | End: 2023-08-18 | Stop reason: SURG

## 2023-08-18 RX ORDER — CEFAZOLIN SODIUM IN 0.9 % NACL 3 G/100 ML
3 INTRAVENOUS SOLUTION, PIGGYBACK (ML) INTRAVENOUS ONCE
Status: COMPLETED | OUTPATIENT
Start: 2023-08-18 | End: 2023-08-18

## 2023-08-18 RX ORDER — OXYTOCIN/0.9 % SODIUM CHLORIDE 30/500 ML
650 PLASTIC BAG, INJECTION (ML) INTRAVENOUS ONCE
Status: DISCONTINUED | OUTPATIENT
Start: 2023-08-18 | End: 2023-08-18 | Stop reason: HOSPADM

## 2023-08-18 RX ORDER — ONDANSETRON 4 MG/1
4 TABLET, FILM COATED ORAL EVERY 8 HOURS PRN
Status: DISCONTINUED | OUTPATIENT
Start: 2023-08-18 | End: 2023-08-21 | Stop reason: HOSPADM

## 2023-08-18 RX ORDER — MISOPROSTOL 200 UG/1
600 TABLET ORAL AS NEEDED
Status: DISCONTINUED | OUTPATIENT
Start: 2023-08-18 | End: 2023-08-21 | Stop reason: HOSPADM

## 2023-08-18 RX ORDER — PRENATAL VIT/IRON FUM/FOLIC AC 27MG-0.8MG
1 TABLET ORAL DAILY
Status: DISCONTINUED | OUTPATIENT
Start: 2023-08-18 | End: 2023-08-21 | Stop reason: HOSPADM

## 2023-08-18 RX ORDER — OXYCODONE HYDROCHLORIDE 5 MG/1
5 TABLET ORAL EVERY 4 HOURS PRN
Status: DISCONTINUED | OUTPATIENT
Start: 2023-08-18 | End: 2023-08-21 | Stop reason: HOSPADM

## 2023-08-18 RX ORDER — OXYTOCIN/0.9 % SODIUM CHLORIDE 30/500 ML
85 PLASTIC BAG, INJECTION (ML) INTRAVENOUS ONCE
Status: DISCONTINUED | OUTPATIENT
Start: 2023-08-18 | End: 2023-08-18 | Stop reason: HOSPADM

## 2023-08-18 RX ORDER — SODIUM CHLORIDE 9 MG/ML
40 INJECTION, SOLUTION INTRAVENOUS AS NEEDED
Status: DISCONTINUED | OUTPATIENT
Start: 2023-08-18 | End: 2023-08-20

## 2023-08-18 RX ORDER — SODIUM CHLORIDE 0.9 % (FLUSH) 0.9 %
1-10 SYRINGE (ML) INJECTION AS NEEDED
Status: DISCONTINUED | OUTPATIENT
Start: 2023-08-18 | End: 2023-08-20

## 2023-08-18 RX ORDER — DIPHENHYDRAMINE HCL 25 MG
25 CAPSULE ORAL EVERY 4 HOURS PRN
Status: DISCONTINUED | OUTPATIENT
Start: 2023-08-18 | End: 2023-08-21 | Stop reason: HOSPADM

## 2023-08-18 RX ORDER — FAMOTIDINE 10 MG/ML
INJECTION, SOLUTION INTRAVENOUS AS NEEDED
Status: DISCONTINUED | OUTPATIENT
Start: 2023-08-18 | End: 2023-08-18 | Stop reason: SURG

## 2023-08-18 RX ORDER — BUPIVACAINE HYDROCHLORIDE 7.5 MG/ML
INJECTION, SOLUTION INTRASPINAL AS NEEDED
Status: DISCONTINUED | OUTPATIENT
Start: 2023-08-18 | End: 2023-08-18 | Stop reason: SURG

## 2023-08-18 RX ORDER — CARBOPROST TROMETHAMINE 250 UG/ML
250 INJECTION, SOLUTION INTRAMUSCULAR AS NEEDED
Status: DISCONTINUED | OUTPATIENT
Start: 2023-08-18 | End: 2023-08-18 | Stop reason: HOSPADM

## 2023-08-18 RX ORDER — GLYCOPYRROLATE 0.2 MG/ML
INJECTION INTRAMUSCULAR; INTRAVENOUS AS NEEDED
Status: DISCONTINUED | OUTPATIENT
Start: 2023-08-18 | End: 2023-08-18 | Stop reason: SURG

## 2023-08-18 RX ORDER — OXYTOCIN/0.9 % SODIUM CHLORIDE 30/500 ML
PLASTIC BAG, INJECTION (ML) INTRAVENOUS AS NEEDED
Status: DISCONTINUED | OUTPATIENT
Start: 2023-08-18 | End: 2023-08-18 | Stop reason: SURG

## 2023-08-18 RX ORDER — EPHEDRINE SULFATE 50 MG/ML
INJECTION, SOLUTION INTRAVENOUS AS NEEDED
Status: DISCONTINUED | OUTPATIENT
Start: 2023-08-18 | End: 2023-08-18 | Stop reason: SURG

## 2023-08-18 RX ORDER — ONDANSETRON 2 MG/ML
4 INJECTION INTRAMUSCULAR; INTRAVENOUS ONCE AS NEEDED
Status: DISCONTINUED | OUTPATIENT
Start: 2023-08-18 | End: 2023-08-18 | Stop reason: HOSPADM

## 2023-08-18 RX ORDER — METHYLERGONOVINE MALEATE 0.2 MG/ML
200 INJECTION INTRAVENOUS AS NEEDED
Status: DISCONTINUED | OUTPATIENT
Start: 2023-08-18 | End: 2023-08-21 | Stop reason: HOSPADM

## 2023-08-18 RX ORDER — KETOROLAC TROMETHAMINE 30 MG/ML
30 INJECTION, SOLUTION INTRAMUSCULAR; INTRAVENOUS ONCE
Status: COMPLETED | OUTPATIENT
Start: 2023-08-18 | End: 2023-08-18

## 2023-08-18 RX ORDER — EPHEDRINE SULFATE 5 MG/ML
INJECTION INTRAVENOUS AS NEEDED
Status: DISCONTINUED | OUTPATIENT
Start: 2023-08-18 | End: 2023-08-18 | Stop reason: SURG

## 2023-08-18 RX ORDER — MORPHINE SULFATE 0.5 MG/ML
INJECTION, SOLUTION EPIDURAL; INTRATHECAL; INTRAVENOUS AS NEEDED
Status: DISCONTINUED | OUTPATIENT
Start: 2023-08-18 | End: 2023-08-18 | Stop reason: SURG

## 2023-08-18 RX ORDER — HYDROCORTISONE 25 MG/G
1 CREAM TOPICAL AS NEEDED
Status: DISCONTINUED | OUTPATIENT
Start: 2023-08-18 | End: 2023-08-21 | Stop reason: HOSPADM

## 2023-08-18 RX ORDER — OXYCODONE HYDROCHLORIDE 10 MG/1
10 TABLET ORAL EVERY 4 HOURS PRN
Status: DISCONTINUED | OUTPATIENT
Start: 2023-08-18 | End: 2023-08-21 | Stop reason: HOSPADM

## 2023-08-18 RX ADMIN — OXYCODONE HYDROCHLORIDE 5 MG: 5 TABLET ORAL at 11:36

## 2023-08-18 RX ADMIN — EPHEDRINE SULFATE 7.5 MG: 5 INJECTION INTRAVENOUS at 08:00

## 2023-08-18 RX ADMIN — OXYCODONE HYDROCHLORIDE 10 MG: 10 TABLET ORAL at 21:21

## 2023-08-18 RX ADMIN — SODIUM CHLORIDE, POTASSIUM CHLORIDE, SODIUM LACTATE AND CALCIUM CHLORIDE 999 ML/HR: 600; 310; 30; 20 INJECTION, SOLUTION INTRAVENOUS at 07:12

## 2023-08-18 RX ADMIN — KETOROLAC TROMETHAMINE 30 MG: 30 INJECTION, SOLUTION INTRAMUSCULAR; INTRAVENOUS at 09:47

## 2023-08-18 RX ADMIN — ACETAMINOPHEN 1000 MG: 500 TABLET ORAL at 14:39

## 2023-08-18 RX ADMIN — ACETAMINOPHEN 1000 MG: 500 TABLET ORAL at 07:23

## 2023-08-18 RX ADMIN — KETOROLAC TROMETHAMINE 15 MG: 15 INJECTION, SOLUTION INTRAMUSCULAR; INTRAVENOUS at 16:25

## 2023-08-18 RX ADMIN — BUPIVACAINE HYDROCHLORIDE IN DEXTROSE 1.8 ML: 7.5 INJECTION, SOLUTION SUBARACHNOID at 07:51

## 2023-08-18 RX ADMIN — OXYCODONE HYDROCHLORIDE 5 MG: 5 TABLET ORAL at 17:03

## 2023-08-18 RX ADMIN — EPHEDRINE SULFATE 25 MG: 50 INJECTION INTRAVENOUS at 08:00

## 2023-08-18 RX ADMIN — GLYCOPYRROLATE 0.2 MG: 0.4 INJECTION INTRAMUSCULAR; INTRAVENOUS at 08:01

## 2023-08-18 RX ADMIN — SODIUM CHLORIDE, POTASSIUM CHLORIDE, SODIUM LACTATE AND CALCIUM CHLORIDE: 600; 310; 30; 20 INJECTION, SOLUTION INTRAVENOUS at 08:02

## 2023-08-18 RX ADMIN — MIDAZOLAM 1 MG: 1 INJECTION INTRAMUSCULAR; INTRAVENOUS at 07:41

## 2023-08-18 RX ADMIN — Medication 500 ML: at 08:16

## 2023-08-18 RX ADMIN — OXYTOCIN 5 UNITS: 10 INJECTION INTRAVENOUS at 08:15

## 2023-08-18 RX ADMIN — SODIUM CITRATE AND CITRIC ACID MONOHYDRATE 30 ML: 500; 334 SOLUTION ORAL at 07:37

## 2023-08-18 RX ADMIN — HUMAN RHO(D) IMMUNE GLOBULIN 1500 UNITS: 1500 SOLUTION INTRAMUSCULAR; INTRAVENOUS at 23:33

## 2023-08-18 RX ADMIN — FAMOTIDINE 20 MG: 10 INJECTION INTRAVENOUS at 07:42

## 2023-08-18 RX ADMIN — MIDAZOLAM 2 MG: 1 INJECTION INTRAMUSCULAR; INTRAVENOUS at 08:26

## 2023-08-18 RX ADMIN — SODIUM CHLORIDE, POTASSIUM CHLORIDE, SODIUM LACTATE AND CALCIUM CHLORIDE 1000 ML: 600; 310; 30; 20 INJECTION, SOLUTION INTRAVENOUS at 06:30

## 2023-08-18 RX ADMIN — ONDANSETRON 4 MG: 2 INJECTION INTRAMUSCULAR; INTRAVENOUS at 07:42

## 2023-08-18 RX ADMIN — CEFAZOLIN 3 G: 10 INJECTION, POWDER, FOR SOLUTION INTRAVENOUS at 07:30

## 2023-08-18 RX ADMIN — MORPHINE SULFATE 0.1 MG: 0.5 INJECTION, SOLUTION EPIDURAL; INTRATHECAL; INTRAVENOUS at 07:51

## 2023-08-18 RX ADMIN — ENOXAPARIN SODIUM 40 MG: 100 INJECTION SUBCUTANEOUS at 16:25

## 2023-08-18 RX ADMIN — Medication 500 ML: at 08:47

## 2023-08-18 RX ADMIN — ACETAMINOPHEN 1000 MG: 500 TABLET ORAL at 20:48

## 2023-08-18 RX ADMIN — FENTANYL CITRATE 20 MCG: 50 INJECTION, SOLUTION INTRAMUSCULAR; INTRAVENOUS at 07:51

## 2023-08-18 RX ADMIN — KETOROLAC TROMETHAMINE 15 MG: 15 INJECTION, SOLUTION INTRAMUSCULAR; INTRAVENOUS at 23:33

## 2023-08-18 NOTE — LACTATION NOTE
08/18/23 1400   Maternal Information   Date of Referral 08/18/23   Person Making Referral lactation consultant   Maternal Reason for Referral breastfeeding currently  (Mom has a history of oral restrictions in her children and low milk supply or drying up early.  Mom plans to N/P/S infant during stay.  Encourage following up with the lactation clinic after discharge.)   Infant Reason for Referral other (see comments)  (Infant latched well but broke seal often in CC and cradle on left breast.  Mom obtained 2.5 mls with post feed pumping on the hospital pump.  Breastfeeding education provided, information given.)   Maternal Assessment   Breast Size Issue none   Breast Shape Bilateral:;round   Breast Density Bilateral:;soft   Nipples Bilateral:;everted   Left Nipple Symptoms intact;nontender   Right Nipple Symptoms intact;nontender   Maternal Infant Feeding   Maternal Emotional State receptive;relaxed   Infant Positioning cradle;cross-cradle   Pain with Feeding no   Comfort Measures Before/During Feeding infant position adjusted;maternal position adjusted   Nipple Shape After Feeding, Left Breast round;symmetrical;appropriately projected   Latch Assistance minimal assistance   Support Person Involvement actively supporting mother;verbally supports mother   Milk Expression/Equipment   Breast Pump Type double electric, personal   Breast Pump Flange Type hard   Breast Pump Flange Size 27 mm;24 mm   Equipment for Home Use breast pump ordered through insurance

## 2023-08-18 NOTE — PAYOR COMM NOTE
"Sabra Isaac (27 y.o. Female)     [employer] ID# [id number]    DELIVERY INFORMATION:  C SECTION 8/18/23 @ 08:13  WT 3795 GMS  MALE  APGARS 8/9    From:Joann Jackson LPN, Utilization Review  Phone #[phone]  Fax #[phone]        Date of Birth   1995    Social Security Number       Address   [address] [address]    Home Phone   [phone]    MRN   [id number]       Jehovah's witness   None    Marital Status                               Admission Date   8/18/23    Admission Type   Elective    Admitting Provider   Adwoa Lawrence MD    Attending Provider   Adwoa Lawrence MD    Department, Room/Bed   [facility] LABOR DELIVERY, [facility]7/7       Discharge Date       Discharge Disposition       Discharge Destination                                 Attending Provider: Adwoa Lawrence MD    Allergies: No Known Allergies    Isolation: None   Infection: None   Code Status: Prior    Ht: 177.8 cm (70\")   Wt: 154 kg (339 lb 8.1 oz)    Admission Cmt: None   Principal Problem: None                  Active Insurance as of 8/18/2023       Primary Coverage       Payor Plan Insurance Group Employer/Plan Group    [employer] [place] [employer] [place]ICA[place]        Payor Plan Address Payor Plan Phone Number Payor Plan Fax Number Effective Dates    [address] [address] [phone]  7/3/2023 - None Entered    [facility] [id number]         Subscriber Name Subscriber Birth Date Member ID       SABRA ISAAC 1995 [id number]                     Emergency Contacts        (Rel.) Home Phone Work Phone Mobile Phone    BerryWale (Spouse) [phone] -- [phone]              Insurance Information                  [facility] MEDICAID Phone: [phone]    Subscriber: Berry Sabra Aquino Subscriber#: [id number]    Group#: -- Precert#: --             History & Physical        Adwoa Lawrence MD at 08/18/23 0736          History and " Physical:    Subjective    Chief Complaint   Patient presents with    Scheduled        Sabra Smart is a 27 y.o. year old  with an Estimated Date of Delivery: 23 currently at 39w3d presenting with  repeat  section .    Prenatal care has been with Adwoa Lawrence MD.  It has been significant for  echogenic focus on fetus, history of shoulder dystocia in first pregnancy, history of  section and second pregnancy and desiring a repeat, obesity. .      Review of Systems   Constitutional: Negative.    HENT: Negative.     Respiratory: Negative.     Cardiovascular: Negative.    Gastrointestinal: Negative.    Genitourinary: Negative.    Musculoskeletal: Negative.    Skin: Negative.    Allergic/Immunologic: Negative.    Neurological: Negative.    Hematological: Negative.    Psychiatric/Behavioral: Negative.           Past Medical History:   Diagnosis Date    Anemia     Anxiety and depression     Ectopic pregnancy     History of COVID-19 2022    Morbid obesity with BMI of 40.0-44.9, adult      (spontaneous vaginal delivery)      Past Surgical History:   Procedure Laterality Date     SECTION N/A 2022    Procedure:  SECTION PRIMARY history of shoulder dystocia;  Surgeon: Adwoa Lawrence MD;  Location: [facility] LABOR DELIVERY;  Service: Obstetrics/Gynecology;  Laterality: N/A;    EAR TUBES      KNEE ARTHROSCOPY Right 2020    MOUTH SURGERY      frenulum clipped as a child    TONSILLECTOMY      WISDOM TOOTH EXTRACTION       Family History   Problem Relation Age of Onset    No Known Problems Father     Thyroid disease Mother     No Known Problems Brother     No Known Problems Sister     No Known Problems Son     No Known Problems Daughter     No Known Problems Paternal Grandfather     No Known Problems Maternal Grandmother     No Known Problems Maternal Grandfather     No Known Problems Maternal Aunt     No Known Problems Maternal Uncle     No  "Known Problems Paternal Aunt     No Known Problems Paternal Uncle     Hypertension Other     Ovarian cancer Other      Social History     Tobacco Use    Smoking status: Never    Smokeless tobacco: Never   Vaping Use    Vaping Use: Never used   Substance Use Topics    Alcohol use: No    Drug use: No     Medications Prior to Admission   Medication Sig Dispense Refill Last Dose    Prenatal Vit-Fe Fumarate-FA (PRENATAL 27-) 27-1 MG tablet tablet Take 1 tablet by mouth Daily.   Past Week    sertraline (Zoloft) 50 MG tablet Take 1 tablet by mouth Daily. 90 tablet 4 Past Month     Allergies:  Patient has no known allergies.  OB History    Para Term  AB Living   4 2 2 0 1 2   SAB IAB Ectopic Molar Multiple Live Births   0 0 1 0 0 2      # Outcome Date GA Lbr Carl/2nd Weight Sex Delivery Anes PTL Lv   4 Current            3 Term 22 39w0d  3799 g (8 lb 6 oz) F CS-LTranv   CARLY   2 Ectopic 20           1 Term 19 39w2d 11:05 / 02:50 3655 g (8 lb 0.9 oz) F Vag-Spont EPI N CARLY      Complications: Shoulder Dystocia             Objective    /87 (BP Location: Left arm, Patient Position: Sitting)   Pulse 89   Temp 98.6 øF (37 øC) (Oral)   Resp 16   Ht 177.8 cm (70\")   Wt (!) 154 kg (339 lb 8.1 oz)   LMP 2022 (Approximate)   SpO2 96%   Breastfeeding No   BMI 48.71 kg/mý     Physical Exam    General:  No acute distress   Lung: Clear to auscultation bilaterally, no wheezing, clear at bases   Heart: Regular rate and rhythm, no murmurs    Abdomen: Gravid, nontender   Extremities: 2+ DTR's bilaterally, no edema   FHT's: reactive    Cervix: Deferred   [place]: Contraction are occasional           Lab Review   External Prenatal Results       Pregnancy Outside Results - Transcribed From Office Records - See Scanned Records For Details       Test Value Date Time    ABO  A  08/15/23 1256    Rh  Negative  08/15/23 1256    Antibody Screen  Negative  08/15/23 1256       Negative  23 1056       " Positive  01/10/23 1228       See Final Results  01/10/23 1228    Varicella IgG       Rubella  1.89 index 01/10/23 1228    Hgb  11.7 g/dL 08/15/23 1256       11.5 g/dL 05/23/23 1056       13.0 g/dL 01/10/23 1228       13.1 g/dL 12/21/22 1009       13.1 g/dL 12/21/22 1009    Hct  35.7 % 08/15/23 1256       36.1 % 05/23/23 1056       40.3 % 01/10/23 1228       41.1 % 12/21/22 1009       41.1 % 12/21/22 1009    Glucose Fasting GTT ^ 72 mg/dL 11/20/18     Glucose Tolerance Test 1 hour ^ 135  11/13/18     Glucose Tolerance Test 3 hour ^ 96  11/20/18     Gonorrhea (discrete)  Negative  01/10/23 1228    Chlamydia (discrete)  Negative  01/10/23 1228    RPR  Non Reactive  01/10/23 1228    VDRL       Syphilis Antibody       HBsAg  Negative  01/10/23 1228    Herpes Simplex Virus PCR       Herpes Simplex VIrus Culture       HIV  Non Reactive  01/10/23 1228    Hep C RNA Quant PCR       Hep C Antibody  <0.1 s/co ratio 01/10/23 1228    AFP  38.9 ng/mL 03/07/23 1212    Group B Strep  No Group B Streptococcus isolated  07/26/23 1914    GBS Susceptibility to Clindamycin       GBS Susceptibility to Erythromycin       Fetal Fibronectin       Genetic Testing, Maternal Blood                 Drug Screening       Test Value Date Time    Urine Drug Screen       Amphetamine Screen  Negative  02/07/22 0617    Barbiturate Screen  Negative  02/07/22 0617    Benzodiazepine Screen  Negative  02/07/22 0617    Methadone Screen  Negative  02/07/22 0617    Phencyclidine Screen  Negative  02/07/22 0617    Opiates Screen  Negative  02/07/22 0617    THC Screen  Negative  02/07/22 0617    Cocaine Screen       Propoxyphene Screen  Negative  02/07/22 0617    Buprenorphine Screen  Negative  02/07/22 0617    Methamphetamine Screen       Oxycodone Screen  Negative  02/07/22 0617    Tricyclic Antidepressants Screen  Negative  02/07/22 0617              Legend    ^: Historical                                Lab Results   Component Value Date    ALKPHOS 75  2022    ALKPHOS 75 2022    ALT 28 2022    ALT 28 2022    AST 24 2022    AST 24 2022    CREATININE 0.65 2022    CREATININE 0.65 2022    BILITOT 0.4 2022    BILITOT 0.4 2022     2019    URICACID 4.9 2019     Lab Results   Component Value Date    WBC 6.30 08/15/2023    HGB 11.7 (L) 08/15/2023    HCT 35.7 08/15/2023    MCV 84.6 08/15/2023     08/15/2023        Results for orders placed in visit on 23    US Ob Follow Up Transabdominal Approach    Narrative  PAT NAME: LUZ ISAAC  MED REC#: [id number]  BIRTH DA: [id number]  PAT GEND: F  ACCOUNT#: [id number]  PAT TYPE: O  EXAM LUCHO: [id number]  REF PHYS DEMETRIA SHEIKH  ACCESSION [id number]      Indication  ========    Obesity; 2vc; EFW, BPP      Comparison Studies  =================    The findings of this study are compared to the prior ultrasound study dated 23      History  ======    General History  Height 177 cm  Height (ft) 5 ft  Height (in) 10 in      Method  =======    [place] E6, Transabdominal ultrasound examination. View: Adequate view      Pregnancy  =========    Dudley pregnancy. Number of fetuses: 1      Dating  ======    Cycle: LMP date not known  Method of dating: based on stated KEIRA  GA by prior assessment 37 w + 3 d  KEIRA by prior assessment: 2023  Ultrasound examination on: 2023  GA by U/S based upon: AC, BPD, Femur, HC  GA by U/S 37 w + 6 d  KEIRA by U/S: 2023  Previous dating: based on stated KEIRA, selected on 2023  Agreed KEIRA of previous datin2023  Assigned: based on stated KEIRA, selected on 2023  Assigned GA 37 w + 3 d  Assigned KEIRA: 2023  Pregnancy length 280 d      General Evaluation  ================    Cardiac activity present.  bpm.  Fetal movements visualized.  Presentation cephalic.  Placenta Placental site: anterior.  Umbilical cord Cord vessels: 2 vessel cord.  Amniotic fluid Amount of AF: normal. MVP  7.8 cm. ESTRELLA 18.6 cm. Q1 7.8 cm, Q2 5.7 cm, Q3 0.0 cm, Q4 5.2 cm.      Biophysical Profile  ===============    2: Fetal breathing movements  2: Gross body movements  2: Fetal tone  2: Amniotic fluid volume  8/8 Biophysical profile score      Fetal Biometry  ============    Standard  BPD 92.0 mm  37w 3d        67%        Hadlock    .1 mm  39w 0d        64%        Hadlock    .2 mm  38w 2d        86%        Hadlock    Femur 71.6 mm  36w 5d        31%        Hadlock    HC / AC 0.99    EFW 3,340 g  66%        Quinn    EFW (lb) 7 lb  EFW (oz) 6 oz  EFW by: Hadlock (BPD-HC-AC-FL)  Other: An ultrasound for fetal weight has a margin of error of up to twenty percent.  Extended   3.4 mm  Extremities / Bony Struc  FL / BPD 0.78    FL / HC 0.21    FL / AC 0.21    Other Structures   bpm      Fetal Anatomy  ============    Lateral ventricles: Appears normal  Cavum septi pellucidi: Appears normal  Lips: Appear normal  Profile: Appears normal  Nose: Appears normal  4-chamber view: Appears normal  RVOT view: Appears normal  Heart / Thorax  3-vessel view: Appears normal  Stomach: Appears normal  Kidneys: Appears normal  Bladder: Appears normal  Cervical spine: Appears normal  Thoracic spine: Appears normal  Lumbar spine: Appears normal  Sacral spine: Appears normal  Gender: male  Wants to know gender: yes      Impression  ==========    Male fetus in cephalic presentation with fetal heart rate of 129. Anterior placenta and two-vessel cord noted. Normal fluid volume with an ESTRELLA of 18.6. BPP 8 out of 8.  Estimated fetal weight 7 pounds 6 ounces which is 66 percentile.      Recommendation  ===============    follow up .Return for follow-up as clinically indicated.        Sonographer: Daria Gandhi RN, RDMS  Physician: Adwoa Lawrence MD, FACOG    Electronically signed by: Adwoa Lawrence MD, FACOG at: 2023/08/14 19:55              Patient Active Problem List    Diagnosis Date Noted    Pregnancy 07/26/2023    Acute  "vaginitis 2023    Morbid obesity with BMI of 45.0-49.9, adult 2023    Polyhydramnios, antepartum 2023     Note Last Updated: 2023     ESTRELLA of 21 at 27 weeks.  ESTRELLA 15.8 at 33 weeks.      Echogenic focus of heart of fetus affecting antepartum care of mother 2023     Note Last Updated: 2023     Genetic testing normal      Two vessel cord 2023     Note Last Updated: 2023     We will get growth ultrasounds at 32 and 37 weeks.      Anxiety 2023     Note Last Updated: 2023     Patient to start Zoloft at 12 weeks.      Prenatal care, subsequent pregnancy 01/10/2023    Obesity in pregnancy, antepartum 01/10/2023     Note Last Updated: 2023     hgb a1c at NOB   early glucola 12 weeks-100  Baby asa 12-36 weeks  U/s at 32/37 weeks for growth      Short interval between pregnancies affecting pregnancy, antepartum 01/10/2023    Delivery of pregnancy by  section 2022     Note Last Updated: 2023-out right  for history of shoulder dystocia. Baby girl \"Lakshmi\" weighing 8 pounds 6 ounces    Repeat scheduled on 8/15/2023 at 7:30 AM-orders in      Rh negative, antepartum 2021     Note Last Updated: 2023     Rhogam 23      History of ectopic pregnancy 2020     Note Last Updated: 2020     Status post methotrexate administration for ectopic on 2020.  Blood type A-          Assessment & Plan    ASSESSMENT  IUP at 39w3d  scheduled  section   3. GBBSnegative    PLAN  Admit to labor and delivery   2.   Sequential compression devices, proceed with  section.         Adwoa Lawrence MD  2023@    Electronically signed by Adwoa Lawrence MD at 23 0739       Facility-Administered Medications as of 2023   Medication Dose Route Frequency Provider Last Rate Last Admin    [COMPLETED] acetaminophen (TYLENOL) tablet 1,000 mg  1,000 mg Oral Once Adwoa Lawrence MD   1,000 mg at " 08/18/23 0723    carboprost (HEMABATE) injection 250 mcg  250 mcg Intramuscular PRN Adwoa Lawrence MD        [COMPLETED] ceFAZolin in Sodium Chloride (ANCEF) IVPB solution 3 g  3 g Intravenous Once Adwoa Lawrence  mL/hr at 08/18/23 0730 3 g at 08/18/23 0730    fentaNYL citrate (PF) (SUBLIMAZE) injection 50 mcg  50 mcg Intravenous Q5 Min PRN Romina Pena CRNA        ketorolac (TORADOL) injection 30 mg  30 mg Intravenous Once Adwoa Lawrence MD        [COMPLETED] lactated ringers bolus 1,000 mL  1,000 mL Intravenous Once Adwoa Lawrence MD 2,000 mL/hr at 08/18/23 0630 1,000 mL at 08/18/23 0630    lactated ringers infusion  125 mL/hr Intravenous Continuous Adwoa Lawrence  mL/hr at 08/18/23 0740 New Bag at 08/18/23 0802    methylergonovine (METHERGINE) injection 200 mcg  200 mcg Intramuscular Once PRN Adwoa Lawrence MD        miSOPROStol (CYTOTEC) tablet 800 mcg  800 mcg Rectal PRN Adwoa Lawrence MD        ondansetron (ZOFRAN) injection 4 mg  4 mg Intravenous Once PRN Romina Pena CRNA        oxytocin (PITOCIN) 30 units in 0.9% sodium chloride 500 mL (premix)  650 mL/hr Intravenous Once Adwoa Lawrence MD        Followed by    oxytocin (PITOCIN) 30 units in 0.9% sodium chloride 500 mL (premix)  85 mL/hr Intravenous Once Adwoa Lawrence MD        [COMPLETED] Sod Citrate-Citric Acid (BICITRA) solution 30 mL  30 mL Oral Once Adwoa Lawrence MD   30 mL at 08/18/23 0737     Orders (last 24 hrs)        Start     Ordered    08/18/23 0930  oxytocin (PITOCIN) 30 units in 0.9% sodium chloride 500 mL (premix)  Once        See Hyperspace for full Linked Orders Report.    08/18/23 0843    08/18/23 0930  oxytocin (PITOCIN) 30 units in 0.9% sodium chloride 500 mL (premix)  Once        See Hyperspace for full Linked Orders Report.    08/18/23 0843    08/18/23 0930  ketorolac (TORADOL) injection 30 mg  Once         08/18/23 0843    08/18/23 0902   Transfer Patient  Once         08/18/23 0903    08/18/23 0844  IV Site Care  Continuous         08/18/23 0843    08/18/23 0844  Oxygen Therapy- Nasal Cannula; 2 LPM  Continuous         08/18/23 0843    08/18/23 0844  Respirations  Continuous        Comments: If respiratory rate is less than 10/min, notify the Anesthesiologist    08/18/23 0843    08/18/23 0844  If respiratory is less than 8/min, see Narcan order below. Notify Anesthesiologist STAT.  Continuous         08/18/23 0843    08/18/23 0844  Blood Pressure and Pulse every 4 hours  Continuous         08/18/23 0843    08/18/23 0844  Activity and removal of morales catheter, per Obstetrician, on routine post-operative orders  Continuous         08/18/23 0843    08/18/23 0844  Notify Anesthesiologist for any questions/problems  Continuous         08/18/23 0843    08/18/23 0844  Notify Anesthesia for temp over 101.4F  Continuous         08/18/23 0843    08/18/23 0844  Ambu Bag at Bedside  Continuous         08/18/23 0843    08/18/23 0844  Notify Provider (Specified)  Until Discontinued         08/18/23 0843    08/18/23 0844  Vital Signs Per Hospital Policy  Per Hospital Policy         08/18/23 0843    08/18/23 0844  Strict Bed Rest  Until Discontinued         08/18/23 0843    08/18/23 0844  Fundal & Lochia Check  Per Order Details        Comments: Every 15 Minutes x4, Then Every 30 Minutes x2, Then Every Shift    08/18/23 0843    08/18/23 0844  Diet: Regular/House Diet; Texture: Regular Texture (IDDSI 7); Fluid Consistency: Thin (IDDSI 0)  Diet Effective Now         08/18/23 0843    08/18/23 0843  Fundal & Lochia Check  Every Shift       08/18/23 0843    08/18/23 0843  methylergonovine (METHERGINE) injection 200 mcg  Once As Needed         08/18/23 0843    08/18/23 0843  carboprost (HEMABATE) injection 250 mcg  As Needed         08/18/23 0843    08/18/23 0843  miSOPROStol (CYTOTEC) tablet 800 mcg  As Needed         08/18/23 0843    08/18/23 0843  ondansetron  (ZOFRAN) injection 4 mg  Once As Needed         23 0843    23 0843  fentaNYL citrate (PF) (SUBLIMAZE) injection 50 mcg  Every 5 Minutes PRN         23 0843    23 07  lactated ringers bolus 1,000 mL  Once         23 0611    23 0700  lactated ringers infusion  Continuous         23 0611    23 0700  Sod Citrate-Citric Acid (BICITRA) solution 30 mL  Once         23 0611    23 07  acetaminophen (TYLENOL) tablet 1,000 mg  Once         23 0611    23 0700  ceFAZolin in Sodium Chloride (ANCEF) IVPB solution 3 g  Once         23 0611    23 0612  Admit To Obstetrics Inpatient  Once         23 0611    23 0612  Vital Signs Per Hospital Policy  Per Hospital Policy         23 0611    23 0612  Continuous Fetal Monitoring With NST on Admission and Prior to Initiation of Oxytocin.  Per Order Details        Comments: Continuous Fetal Monitoring With NST on Admission    23 0611    23 0612  External Uterine Contraction Monitoring  Per Hospital Policy         23 0611    23 0612  Notify Provider (Specified)  Until Discontinued         23 0611    23 0612  Notify Provider of Tachysystole (Per Hospital Algorithm)  Until Discontinued         23 0611    23 0612  Notify Provider if Membranes Ruptured, Bleeding Greater Than 1 Pad Per Hour, Fetal Heart Tone Abnormality or Severe Pain  Until Discontinued         23 0611    23 0612  Initiate Group Beta Strep (GBS) Prophylaxis Protocol, If Criteria Met  Continuous        Comments: NO TREATMENT RECOMMENDED IF: 1) Maternal GBS Status Known Negative 2) Scheduled  Birth With Intact Membranes, Not in Labor 3) Maternal GBS Status Unknown, No Risk Factors  TREAT WITH ANTIBIOTICS IF:  1) Maternal GBS Status Known Positive 2) Maternal GBS Status Unknown With Risk Factors: a)  Previous Infant Affected By GBS Infection b) GBS Urinary  Tract Infection (UTI) or Bacteriuria During Pregnancy c) Unexplained Maternal Fever (100.4F (38C) or Greater) During Labor d)  Prolonged Rupture of Membranes (18 or More Hours) e) Gestational Age Less Than 37 Weeks    08/18/23 0611    08/18/23 0612  Insert Indwelling Urinary Catheter  Once        See Hyperspace for full Linked Orders Report.    08/18/23 0611    08/18/23 0612  Assess Need for Indwelling Urinary Catheter - Follow Removal Protocol  Continuous        Comments: Indwelling Urinary Catheter Removal Criteria  Discontinue Indwelling Urinary Catheter Unless One of the Following is Present:  Urinary Retention or Obstruction  Chronic Urinary Catheter Use  End of Life  Critical Illness with Strict I/O   Tract or Abdominal Surgery  Stage 3/4 Sacral / Perineal Wound  Required Activity Restriction: Trauma  Required Activity Restriction: Spine Surgery  If Patient is Being Followed by Urology Contact Them PRIOR to Removal  Do Not Remove Indwelling Urinary Catheter Order is Present with a CLINICAL REASON to Maintain the Catheter. Provider is Required to Include a Clinical Reason to Maintain a Urinary Catheter    Patient Admitted With Indwelling Urinary Catheter (Not Placed at [facility] Facility)  Assess for Continued Need & Document Medical Necessity  If Infection is Suspected, Contact the Provider       See Hyperspace for full Linked Orders Report.    08/18/23 0611    08/18/23 0612  Abdominal Prep With Clippers  Once         08/18/23 0611    08/18/23 0612  Chlorhexadine Skin Prep Unless Otherwise Indicated  Once         08/18/23 0611    08/18/23 0612  SCD (Sequential Compression Devices)  Once         08/18/23 0611    08/18/23 0612  POC Glucose Once  Once         08/18/23 0611    08/18/23 0612  Document Gatorade Consumption Prior to Admission (Yes or No)  Once         08/18/23 0611    08/18/23 0612  NPO Diet NPO Type: Ice Chips  Diet Effective Now,   Status:  Canceled         08/18/23 0611    08/18/23 0612  Inpatient  Anesthesiology Consult  Once        Specialty:  Anesthesiology  Provider:  (Not yet assigned)    08/18/23 0611    08/18/23 0612  Urine Drug Screen - Urine, Clean Catch  Once         08/18/23 0611    08/18/23 0611  Urinary Catheter Care  Every Shift      See Hyperspace for full Linked Orders Report.    08/18/23 0611    Unscheduled  Blood Gas, Arterial -With Co-Ox Panel: Yes  As Needed       08/18/23 0843    Signed and Held  diphenhydrAMINE (BENADRYL) capsule 25 mg  Every 4 Hours PRN        See Hyperspace for full Linked Orders Report.    Signed and Held    Signed and Held  diphenhydrAMINE (BENADRYL) injection 25 mg  Every 4 Hours PRN        See Hyperspace for full Linked Orders Report.    Signed and Held    Signed and Held  diphenhydrAMINE (BENADRYL) injection 25 mg  Every 4 Hours PRN        See Hyperspace for full Linked Orders Report.    Signed and Held    Signed and Held  naloxone (NARCAN) injection 0.4 mg  Every 1 Hour PRN         Signed and Held    Signed and Held  sertraline (ZOLOFT) tablet 50 mg  Daily         Signed and Held    Signed and Held  Code Status and Medical Interventions:  Continuous         Signed and Held    Signed and Held  Vital Signs Per hospital policy  Per Hospital Policy         Signed and Held    Signed and Held  Notify Physician  Until Discontinued         Signed and Held    Signed and Held  Up As Tolerated  Until Discontinued         Signed and Held    Signed and Held  Ambulate Patient  2 Times Daily      Comments: After anesthesia wears off.    Signed and Held    Signed and Held  Patient May Shower  Once        Comments: After anesthesia wears off and with assistance    Signed and Held    Signed and Held  Advance Diet As Tolerated -Regular  Until Discontinued         Signed and Held    Signed and Held  I/O  Every Shift       Signed and Held    Signed and Held  Fundal and Lochia Check  Per Hospital Policy        Comments: Q 30 min x 2, Q 1 hr x 4, Q 4 hrs x 24 hrs, then Q shift.     "Signed and Held    Signed and Held  Continue Indwelling Urinary Catheter Already in Place  Once         Signed and Held    Signed and Held  Discontinue Indwelling Urinary Catheter in AM  Once         Signed and Held    Signed and Held  Bladder Scan if Patient Unable to Void 4-6 Hours After Catheter Removal  As Needed         Signed and Held    Signed and Held  Straight Cath Every 4-6 Hours As Needed If Patient is Unable to Void After 4-6 Hours, Bladder Scan Volume is Greater Than 350-500mL & Patient Has Symptoms of Bladder Discomfort / Distention  As Needed         Signed and Held    Signed and Held  Notify Provider if Bladder Distention Continues  Until Discontinued         Signed and Held    Signed and Held  Schedule / Prompt Voiding For Patients With Urinary Incontinence  As Needed         Signed and Held    Signed and Held  Urinary Catheter Care  Every Shift       Signed and Held    Signed and Held  Abdominal Wound Care  As Needed        Comments: Postop day 1. Remove dressing and leave incision open to air.    Signed and Held    Signed and Held  Turn Cough Deep Breathe  Once         Signed and Held    Signed and Held  Incentive spirometry RT  Every Hour       Signed and Held    Signed and Held  Encourage early intake of PO fluids  Continuous         Signed and Held    Signed and Held  Ambulate Patient 3-5 times per day (with or without Pham)  Every Shift       Signed and Held    Signed and Held  Chewing Gum  As Needed         Signed and Held    Signed and Held  Apply Abdominal Binding Until Discontinued  Until Discontinued         Signed and Held    Signed and Held  \"If patient tolerates food and liquids after completion of second bag of Pitocin, saline lock IV and discontinue IV fluid infusions.  Once         Signed and Held    Signed and Held  Warm compress  As Needed         Signed and Held    Signed and Held  Breast pump to bed  Once         Signed and Held    Signed and Held  Apply ace wrap, tight bra, or " binder  As Needed         Signed and Held    Signed and Held  Apply ice packs  As Needed         Signed and Held    Signed and Held  If indicated -- Please administer RH Immunoglobulin based on results of cord blood evaluation and fetal screen lab tests, pharmacy to dispense  Per Order Details        Comments: See Process Instructions For Reference Range Details.    Signed and Held    Signed and Held  CBC & Differential  Timed         Signed and Held    Signed and Held  Insert Peripheral IV  Once         Signed and Held    Signed and Held  Saline Lock & Maintain IV Access  Continuous         Signed and Held    Signed and Held  sodium chloride 0.9 % flush 10 mL  Every 12 Hours Scheduled         Signed and Held    Signed and Held  sodium chloride 0.9 % flush 1-10 mL  As Needed         Signed and Held    Signed and Held  sodium chloride 0.9 % infusion 40 mL  As Needed         Signed and Held    Signed and Held  sodium chloride 0.9 % bolus 1,000 mL  Once         Signed and Held    Signed and Held  acetaminophen (TYLENOL) tablet 1,000 mg  Every 6 Hours        See Hyperspace for full Linked Orders Report.    Signed and Held    Signed and Held  acetaminophen (TYLENOL) tablet 650 mg  Every 6 Hours        See Hyperspace for full Linked Orders Report.    Signed and Held    Signed and Held  ketorolac (TORADOL) injection 15 mg  Every 6 Hours        See Hyperspace for full Linked Orders Report.    Signed and Held    Signed and Held  ibuprofen (ADVIL,MOTRIN) tablet 600 mg  Every 6 Hours        See Hyperspace for full Linked Orders Report.    Signed and Held    Signed and Held  oxyCODONE (ROXICODONE) immediate release tablet 5 mg  Every 4 Hours PRN        See Hyperspace for full Linked Orders Report.    Signed and Held    Signed and Held  oxyCODONE (ROXICODONE) immediate release tablet 10 mg  Every 4 Hours PRN        See Hyperspace for full Linked Orders Report.    Signed and Held    Signed and Held  docusate sodium (COLACE)  capsule 100 mg  2 Times Daily PRN         Signed and Held    Signed and Held  simethicone (MYLICON) chewable tablet 80 mg  4 Times Daily PRN         Signed and Held    Signed and Held  ondansetron (ZOFRAN) tablet 4 mg  Every 8 Hours PRN         Signed and Held    Signed and Held  promethazine (PHENERGAN) tablet 25 mg  Every 6 Hours PRN        See Hyperspace for full Linked Orders Report.    Signed and Held    Signed and Held  promethazine (PHENERGAN) suppository 12.5 mg  Every 6 Hours PRN        See Hyperspace for full Linked Orders Report.    Signed and Held    Signed and Held  lanolin topical 1 application   Every 1 Hour PRN         Signed and Held    Signed and Held  carboprost (HEMABATE) injection 250 mcg  As Needed         Signed and Held    Signed and Held  miSOPROStol (CYTOTEC) tablet 600 mcg  As Needed         Signed and Held    Signed and Held  methylergonovine (METHERGINE) injection 200 mcg  As Needed         Signed and Held    Signed and Held  Hydrocortisone (Perianal) (ANUSOL-HC) 2.5 % rectal cream 1 application   As Needed         Signed and Held    Signed and Held  prenatal vitamin tablet 1 tablet  Daily         Signed and Held    Signed and Held  aluminum-magnesium hydroxide-simethicone (MAALOX MAX) 400-400-40 MG/5ML suspension 15 mL  Every 4 Hours PRN        See Hyperspace for full Linked Orders Report.    Signed and Held    Signed and Held  calcium carbonate (TUMS) chewable tablet 500 mg (200 mg elemental)  Every 4 Hours PRN        See Hyperspace for full Linked Orders Report.    Signed and Held    Signed and Held  Place Sequential Compression Device  Once         Signed and Held    Signed and Held  Maintain Sequential Compression Device  Continuous         Signed and Held    Signed and Held  Enoxaparin Sodium (LOVENOX) syringe 40 mg  Daily         Signed and Held                     Operative/Procedure Notes (last 24 hours)        Adwoa Lawrence MD at 08/18/23 0904          Please see op  note    Electronically signed by Adwoa Lawrence MD at 23 0904       Adwoa Lawrence MD at 23 0804          [facility]   Section Operative Note    Pre-Operative Dx:   1.  IUP at 39w3d  weeks     2. Prior         Postoperative dx:    1.  Same     Procedure: Procedure(s):   SECTION REPEAT   Surgeon: Adwoa Lawrence MD    Assistant: Surgeon(s):  Adwoa Lawrence MD        Anesthesia: Spinal    EBL:   mls.  240  mls.         IV Fluids: 1700 mls.   UOP: 50 mls.       Antibiotics: cefazolin (Ancef)     Infant:            Gender: male  infant    Weight: 3795 g (8 lb 5.9 oz)     Apgars: 8  @ 1 minute /     9  @ 5 minutes    Fetal presentation: cephalic   Amniotic fluid: Clear      Complications:   None      Disposition:   Mother to Mother Baby/Postpartum  in stable condition currently.   Baby to NBN  in stable condition currently.       Procedure:   The patient was taken to the operating room where spinal anesthesia was placed, and she was placed in supine position with a leftward tilt. Sequential compression devices on bilateral lower extremities and a Pham catheter placed in her bladder. After being prepped and draped in a normal sterile fashion, a Pfannenstiel skin incision was made with a scalpel through her prior incision and taken down to the level of the fascia using the Bovie. The fascia was incised in the midline and extended laterally. The superior edge of the fascia was grasped with Kocher clamps, elevated and the rectus muscle dissected off. In a similar fashion, the inferior edge of the fascia was grasped with Kocher clamps, elevated and the rectus muscles dissected off. The rectus muscles were bisected in the midline. The peritoneum was identified, grasped and entered into sharply using Metzenbaum scissors. This incision was extended superiorly and inferiorly with good visualization of the bladder. Bladder blade was placed. A bladder flap was  created. A low transverse uterine incision was made with a scalpel and extended laterally. Amniotomy with clear fluid occurred at the time of hysterotomy. The head was brought to the uterine incision, and using fundal pressure, the head delivered without complication. Nose and mouth were bulb suctioned.  Shoulders and body were to follow. Cord was milked x4, clamped x2 and cut. Infant was handed to the awaiting pediatric team.  Cord blood was obtained. The placenta was manually extracted. The uterus was exteriorized and cleared of all clot and debris and the hysterotomy site was closed with a 1-0 chromic in a running locked fashion starting at the left angle and moving towards the right. Copious irrigation behind the uterus ensued. The uterus was returned to the abdominal cavity. Paracolic gutters were cleared of all clot and debris. The hysterotomy site was noted to be hemostatic as well as the bladder flap and Interceed was placed over this. The peritoneum was reapproximated using a 2-0 chromic in a running fashion starting superiorly and moving inferiorly. Irrigation over the rectus muscles then occurred with Bovie cauterization of any bleeding sites. The fascia was reapproximated using a looped 0 PDS in a running fashion starting at the left angle and moving towards the right.  The subcutaneous fat layer was hemostatic and closed in an interrupted fashion with 2-0 Plain.  The skin was reapproximated with a 4-0 vicryl on a Abraham needle. All sponge, lap, and needle counts were correct x2. The patient was taken to the recovery room in stable condition.             Adwoa Lawrence MD  8/18/2023  09:04 EDT        Electronically signed by Adwoa Lawrence MD at 08/18/23 0905       Physician Progress Notes (last 24 hours)  Notes from 08/17/23 0919 through 08/18/23 0919   No notes of this type exist for this encounter.

## 2023-08-18 NOTE — ANESTHESIA PREPROCEDURE EVALUATION
Anesthesia Evaluation     Patient summary reviewed and Nursing notes reviewed   NPO Solid Status: > 8 hours  NPO Liquid Status: > 8 hours           Airway   Dental      Pulmonary - negative pulmonary ROS   Cardiovascular - negative cardio ROS        Neuro/Psych  (+) psychiatric history  GI/Hepatic/Renal/Endo    (+) obesity, morbid obesity    Musculoskeletal (-) negative ROS    Abdominal    Substance History - negative use     OB/GYN    (+) Pregnant        Other                      Anesthesia Plan    ASA 3     spinal and ITN       Anesthetic plan, risks, benefits, and alternatives have been provided, discussed and informed consent has been obtained with: patient.    CODE STATUS:

## 2023-08-18 NOTE — PLAN OF CARE
Problem: Bleeding ( Delivery)  Goal: Bleeding is Controlled  Outcome: Met     Problem: Change in Fetal Wellbeing ( Delivery)  Goal: Stable Fetal Wellbeing  Outcome: Met     Problem: Infection ( Delivery)  Goal: Absence of Infection Signs and Symptoms  Outcome: Met     Problem: Respiratory Compromise ( Delivery)  Goal: Effective Oxygenation and Ventilation  Outcome: Met   Goal Outcome Evaluation:                        
Goal Outcome Evaluation:                        
Goal Outcome Evaluation:  Plan of Care Reviewed With: spouse, patient        Progress: improving            
Fair

## 2023-08-18 NOTE — H&P
History and Physical:    Subjective     Chief Complaint   Patient presents with    Scheduled        Sabra Smart is a 27 y.o. year old  with an Estimated Date of Delivery: 23 currently at 39w3d presenting with  repeat  section .    Prenatal care has been with Adwoa Lawrence MD.  It has been significant for  echogenic focus on fetus, history of shoulder dystocia in first pregnancy, history of  section and second pregnancy and desiring a repeat, obesity. .      Review of Systems   Constitutional: Negative.    HENT: Negative.     Respiratory: Negative.     Cardiovascular: Negative.    Gastrointestinal: Negative.    Genitourinary: Negative.    Musculoskeletal: Negative.    Skin: Negative.    Allergic/Immunologic: Negative.    Neurological: Negative.    Hematological: Negative.    Psychiatric/Behavioral: Negative.           Past Medical History:   Diagnosis Date    Anemia     Anxiety and depression     Ectopic pregnancy     History of COVID-19 2022    Morbid obesity with BMI of 40.0-44.9, adult      (spontaneous vaginal delivery)      Past Surgical History:   Procedure Laterality Date     SECTION N/A 2022    Procedure:  SECTION PRIMARY history of shoulder dystocia;  Surgeon: Adwoa Lawrence MD;  Location: [facility] LABOR DELIVERY;  Service: Obstetrics/Gynecology;  Laterality: N/A;    EAR TUBES      KNEE ARTHROSCOPY Right 2020    MOUTH SURGERY      frenulum clipped as a child    TONSILLECTOMY      WISDOM TOOTH EXTRACTION       Family History   Problem Relation Age of Onset    No Known Problems Father     Thyroid disease Mother     No Known Problems Brother     No Known Problems Sister     No Known Problems Son     No Known Problems Daughter     No Known Problems Paternal Grandfather     No Known Problems Maternal Grandmother     No Known Problems Maternal Grandfather     No Known Problems Maternal Aunt     No Known Problems Maternal  "Uncle     No Known Problems Paternal Aunt     No Known Problems Paternal Uncle     Hypertension Other     Ovarian cancer Other      Social History     Tobacco Use    Smoking status: Never    Smokeless tobacco: Never   Vaping Use    Vaping Use: Never used   Substance Use Topics    Alcohol use: No    Drug use: No     Medications Prior to Admission   Medication Sig Dispense Refill Last Dose    Prenatal Vit-Fe Fumarate-FA (PRENATAL 27-) 27-1 MG tablet tablet Take 1 tablet by mouth Daily.   Past Week    sertraline (Zoloft) 50 MG tablet Take 1 tablet by mouth Daily. 90 tablet 4 Past Month     Allergies:  Patient has no known allergies.  OB History    Para Term  AB Living   4 2 2 0 1 2   SAB IAB Ectopic Molar Multiple Live Births   0 0 1 0 0 2      # Outcome Date GA Lbr Carl/2nd Weight Sex Delivery Anes PTL Lv   4 Current            3 Term 22 39w0d  3799 g (8 lb 6 oz) F CS-LTranv   CARLY   2 Ectopic 20           1 Term 19 39w2d 11:05 / 02:50 3655 g (8 lb 0.9 oz) F Vag-Spont EPI N CARLY      Complications: Shoulder Dystocia             Objective     /87 (BP Location: Left arm, Patient Position: Sitting)   Pulse 89   Temp 98.6 øF (37 øC) (Oral)   Resp 16   Ht 177.8 cm (70\")   Wt (!) 154 kg (339 lb 8.1 oz)   LMP 2022 (Approximate)   SpO2 96%   Breastfeeding No   BMI 48.71 kg/mý     Physical Exam    General:  No acute distress   Lung: Clear to auscultation bilaterally, no wheezing, clear at bases   Heart: Regular rate and rhythm, no murmurs    Abdomen: Gravid, nontender   Extremities: 2+ DTR's bilaterally, no edema   FHT's: reactive    Cervix: Deferred   [place]: Contraction are occasional           Lab Review   External Prenatal Results       Pregnancy Outside Results - Transcribed From Office Records - See Scanned Records For Details       Test Value Date Time    ABO  A  08/15/23 1256    Rh  Negative  08/15/23 1256    Antibody Screen  Negative  08/15/23 1256       Negative  " 05/23/23 1056       Positive  01/10/23 1228       See Final Results  01/10/23 1228    Varicella IgG       Rubella  1.89 index 01/10/23 1228    Hgb  11.7 g/dL 08/15/23 1256       11.5 g/dL 05/23/23 1056       13.0 g/dL 01/10/23 1228       13.1 g/dL 12/21/22 1009       13.1 g/dL 12/21/22 1009    Hct  35.7 % 08/15/23 1256       36.1 % 05/23/23 1056       40.3 % 01/10/23 1228       41.1 % 12/21/22 1009       41.1 % 12/21/22 1009    Glucose Fasting GTT ^ 72 mg/dL 11/20/18     Glucose Tolerance Test 1 hour ^ 135  11/13/18     Glucose Tolerance Test 3 hour ^ 96  11/20/18     Gonorrhea (discrete)  Negative  01/10/23 1228    Chlamydia (discrete)  Negative  01/10/23 1228    RPR  Non Reactive  01/10/23 1228    VDRL       Syphilis Antibody       HBsAg  Negative  01/10/23 1228    Herpes Simplex Virus PCR       Herpes Simplex VIrus Culture       HIV  Non Reactive  01/10/23 1228    Hep C RNA Quant PCR       Hep C Antibody  <0.1 s/co ratio 01/10/23 1228    AFP  38.9 ng/mL 03/07/23 1212    Group B Strep  No Group B Streptococcus isolated  07/26/23 1914    GBS Susceptibility to Clindamycin       GBS Susceptibility to Erythromycin       Fetal Fibronectin       Genetic Testing, Maternal Blood                 Drug Screening       Test Value Date Time    Urine Drug Screen       Amphetamine Screen  Negative  02/07/22 0617    Barbiturate Screen  Negative  02/07/22 0617    Benzodiazepine Screen  Negative  02/07/22 0617    Methadone Screen  Negative  02/07/22 0617    Phencyclidine Screen  Negative  02/07/22 0617    Opiates Screen  Negative  02/07/22 0617    THC Screen  Negative  02/07/22 0617    Cocaine Screen       Propoxyphene Screen  Negative  02/07/22 0617    Buprenorphine Screen  Negative  02/07/22 0617    Methamphetamine Screen       Oxycodone Screen  Negative  02/07/22 0617    Tricyclic Antidepressants Screen  Negative  02/07/22 0617              Legend    ^: Historical                                Lab Results   Component Value Date     ALKPHOS 75 2022    ALKPHOS 75 2022    ALT 28 2022    ALT 28 2022    AST 24 2022    AST 24 2022    CREATININE 0.65 2022    CREATININE 0.65 2022    BILITOT 0.4 2022    BILITOT 0.4 2022     2019    URICACID 4.9 2019     Lab Results   Component Value Date    WBC 6.30 08/15/2023    HGB 11.7 (L) 08/15/2023    HCT 35.7 08/15/2023    MCV 84.6 08/15/2023     08/15/2023        Results for orders placed in visit on 23    US Ob Follow Up Transabdominal Approach    Narrative  PAT NAME: LUZ ISAAC  MED REC#: [id number]  BIRTH [birth year]: [id number]  PAT GEND: F  ACCOUNT#: [id number]  PAT TYPE: O  EXAM LUCHO: [id number]  REF PHYS DEMETRIA SHEIKH  ACCESSION [id number]      Indication  ========    Obesity; 2vc; EFW, BPP      Comparison Studies  =================    The findings of this study are compared to the prior ultrasound study dated 23      History  ======    General History  Height 177 cm  Height (ft) 5 ft  Height (in) 10 in      Method  =======    [place] E6, Transabdominal ultrasound examination. View: Adequate view      Pregnancy  =========    Dudley pregnancy. Number of fetuses: 1      Dating  ======    Cycle: LMP date not known  Method of dating: based on stated KEIRA  GA by prior assessment 37 w + 3 d  KEIRA by prior assessment: 2023  Ultrasound examination on: 2023  GA by U/S based upon: AC, BPD, Femur, HC  GA by U/S 37 w + 6 d  KEIRA by U/S: 2023  Previous dating: based on stated KEIRA, selected on 2023  Agreed KEIRA of previous datin2023  Assigned: based on stated KEIRA, selected on 2023  Assigned GA 37 w + 3 d  Assigned KEIRA: 2023  Pregnancy length 280 d      General Evaluation  ================    Cardiac activity present.  bpm.  Fetal movements visualized.  Presentation cephalic.  Placenta Placental site: anterior.  Umbilical cord Cord vessels: 2 vessel cord.  Amniotic fluid Amount of AF:  normal. MVP 7.8 cm. ESTRELLA 18.6 cm. Q1 7.8 cm, Q2 5.7 cm, Q3 0.0 cm, Q4 5.2 cm.      Biophysical Profile  ===============    2: Fetal breathing movements  2: Gross body movements  2: Fetal tone  2: Amniotic fluid volume  8/8 Biophysical profile score      Fetal Biometry  ============    Standard  BPD 92.0 mm  37w 3d        67%        Hadlock    .1 mm  39w 0d        64%        Hadlock    .2 mm  38w 2d        86%        Hadlock    Femur 71.6 mm  36w 5d        31%        Hadlock    HC / AC 0.99    EFW 3,340 g  66%        Quinn    EFW (lb) 7 lb  EFW (oz) 6 oz  EFW by: Hadlock (BPD-HC-AC-FL)  Other: An ultrasound for fetal weight has a margin of error of up to twenty percent.  Extended   3.4 mm  Extremities / Bony Struc  FL / BPD 0.78    FL / HC 0.21    FL / AC 0.21    Other Structures   bpm      Fetal Anatomy  ============    Lateral ventricles: Appears normal  Cavum septi pellucidi: Appears normal  Lips: Appear normal  Profile: Appears normal  Nose: Appears normal  4-chamber view: Appears normal  RVOT view: Appears normal  Heart / Thorax  3-vessel view: Appears normal  Stomach: Appears normal  Kidneys: Appears normal  Bladder: Appears normal  Cervical spine: Appears normal  Thoracic spine: Appears normal  Lumbar spine: Appears normal  Sacral spine: Appears normal  Gender: male  Wants to know gender: yes      Impression  ==========    Male fetus in cephalic presentation with fetal heart rate of 129. Anterior placenta and two-vessel cord noted. Normal fluid volume with an ESTRELLA of 18.6. BPP 8 out of 8.  Estimated fetal weight 7 pounds 6 ounces which is 66 percentile.      Recommendation  ===============    follow up .Return for follow-up as clinically indicated.        Sonographer: Daria Gandhi RN, RDMS  Physician: Adwoa Lawrence MD, FACOG    Electronically signed by: Adwoa Lawrence MD, FACOG at: 2023/08/14 19:55              Patient Active Problem List    Diagnosis Date Noted    Pregnancy 07/26/2023     "Acute vaginitis 2023    Morbid obesity with BMI of 45.0-49.9, adult 2023    Polyhydramnios, antepartum 2023     Note Last Updated: 2023     ESTRELLA of 21 at 27 weeks.  ESTRELLA 15.8 at 33 weeks.      Echogenic focus of heart of fetus affecting antepartum care of mother 2023     Note Last Updated: 2023     Genetic testing normal      Two vessel cord 2023     Note Last Updated: 2023     We will get growth ultrasounds at 32 and 37 weeks.      Anxiety 2023     Note Last Updated: 2023     Patient to start Zoloft at 12 weeks.      Prenatal care, subsequent pregnancy 01/10/2023    Obesity in pregnancy, antepartum 01/10/2023     Note Last Updated: 2023     hgb a1c at NOB   early glucola 12 weeks-100  Baby asa 12-36 weeks  U/s at 32/37 weeks for growth      Short interval between pregnancies affecting pregnancy, antepartum 01/10/2023    Delivery of pregnancy by  section 2022     Note Last Updated: 2023-out right  for history of shoulder dystocia. Baby girl \"Lakshmi\" weighing 8 pounds 6 ounces    Repeat scheduled on 8/15/2023 at 7:30 AM-orders in      Rh negative, antepartum 2021     Note Last Updated: 2023     Rhogam 23      History of ectopic pregnancy 2020     Note Last Updated: 2020     Status post methotrexate administration for ectopic on 2020.  Blood type A-          Assessment & Plan     ASSESSMENT  IUP at 39w3d  scheduled  section   3. GBBSnegative    PLAN  Admit to labor and delivery   2.   Sequential compression devices, proceed with  section.         Adwoa Lawrence MD  2023@  "

## 2023-08-18 NOTE — ANESTHESIA POSTPROCEDURE EVALUATION
Patient: Sabra Smart    Procedure Summary       Date: 23 Room / Location: [facility] LABOR DELIVERY   [facility]IV[facility]    Anesthesia Start: 740 Anesthesia Stop: 900    Procedure:  SECTION REPEAT (Abdomen) Diagnosis:     Surgeons: Adwoa Lawrence MD Provider: Babar Keating MD    Anesthesia Type: spinal, ITN ASA Status: 3            Anesthesia Type: spinal, ITN    Vitals  Vitals Value Taken Time   /48 23 0851   Temp 97.8    Pulse 79 23 0900   Resp 20    SpO2 99 % 23   Vitals shown include unvalidated device data.        Post Anesthesia Care and Evaluation    Patient location during evaluation: bedside  Patient participation: complete - patient participated  Level of consciousness: awake and alert  Pain score: 0  Pain management: adequate    Airway patency: patent  Anesthetic complications: No anesthetic complications    Cardiovascular status: acceptable  Respiratory status: acceptable  Hydration status: acceptable

## 2023-08-18 NOTE — OP NOTE
[facility]   Section Operative Note    Pre-Operative Dx:   1.  IUP at 39w3d  weeks     2. Prior         Postoperative dx:    1.  Same     Procedure: Procedure(s):   SECTION REPEAT   Surgeon: Adwoa Lawrence MD    Assistant: Surgeon(s):  Adwoa Lawrence MD        Anesthesia: Spinal    EBL:   mls.  240  mls.         IV Fluids: 1700 mls.   UOP: 50 mls.       Antibiotics: cefazolin (Ancef)     Infant:            Gender: male  infant    Weight: 3795 g (8 lb 5.9 oz)     Apgars: 8  @ 1 minute /     9  @ 5 minutes    Fetal presentation: cephalic   Amniotic fluid: Clear      Complications:   None      Disposition:   Mother to Mother Baby/Postpartum  in stable condition currently.   Baby to NBN  in stable condition currently.       Procedure:   The patient was taken to the operating room where spinal anesthesia was placed, and she was placed in supine position with a leftward tilt. Sequential compression devices on bilateral lower extremities and a Pham catheter placed in her bladder. After being prepped and draped in a normal sterile fashion, a Pfannenstiel skin incision was made with a scalpel through her prior incision and taken down to the level of the fascia using the Bovie. The fascia was incised in the midline and extended laterally. The superior edge of the fascia was grasped with Kocher clamps, elevated and the rectus muscle dissected off. In a similar fashion, the inferior edge of the fascia was grasped with Kocher clamps, elevated and the rectus muscles dissected off. The rectus muscles were bisected in the midline. The peritoneum was identified, grasped and entered into sharply using Metzenbaum scissors. This incision was extended superiorly and inferiorly with good visualization of the bladder. Bladder blade was placed. A bladder flap was created. A low transverse uterine incision was made with a scalpel and extended laterally. Amniotomy with clear fluid occurred at the  time of hysterotomy. The head was brought to the uterine incision, and using fundal pressure, the head delivered without complication. Nose and mouth were bulb suctioned.  Shoulders and body were to follow. Cord was milked x4, clamped x2 and cut. Infant was handed to the awaiting pediatric team.  Cord blood was obtained. The placenta was manually extracted. The uterus was exteriorized and cleared of all clot and debris and the hysterotomy site was closed with a 1-0 chromic in a running locked fashion starting at the left angle and moving towards the right. Copious irrigation behind the uterus ensued. The uterus was returned to the abdominal cavity. Paracolic gutters were cleared of all clot and debris. The hysterotomy site was noted to be hemostatic as well as the bladder flap and Interceed was placed over this. The peritoneum was reapproximated using a 2-0 chromic in a running fashion starting superiorly and moving inferiorly. Irrigation over the rectus muscles then occurred with Bovie cauterization of any bleeding sites. The fascia was reapproximated using a looped 0 PDS in a running fashion starting at the left angle and moving towards the right.  The subcutaneous fat layer was hemostatic and closed in an interrupted fashion with 2-0 Plain.  The skin was reapproximated with a 4-0 vicryl on a Abraham needle. All sponge, lap, and needle counts were correct x2. The patient was taken to the recovery room in stable condition.             Adwoa Lawrence MD  8/18/2023  09:04 EDT

## 2023-08-18 NOTE — ANESTHESIA PROCEDURE NOTES
Spinal Block      Patient reassessed immediately prior to procedure    Indication:procedure for pain  Performed By  CRNA/CAA: Romina Pena CRNA  Preanesthetic Checklist  Completed: patient identified, IV checked, risks and benefits discussed, surgical consent, monitors and equipment checked, pre-op evaluation and timeout performed  Spinal Block Prep:  Patient Position:sitting  Sterile Tech:cap, gloves, mask and sterile barriers  Prep:Betadine  Patient Monitoring:blood pressure monitoring, continuous pulse oximetry and EKG    Spinal Block Procedure  Approach:midline  Guidance:palpation technique  Location:L3-L4  Needle Type:Huong  Needle Gauge:25 G  Placement of Spinal needle event:cerebrospinal fluid aspirated  Paresthesia: no  Fluid Appearance:clear     Post Assessment  Patient Tolerance:patient tolerated the procedure well with no apparent complications  Complications no

## 2023-08-19 LAB
BASOPHILS # BLD AUTO: 0.02 10*3/MM3 (ref 0–0.2)
BASOPHILS NFR BLD AUTO: 0.4 % (ref 0–1.5)
DEPRECATED RDW RBC AUTO: 46.4 FL (ref 37–54)
EOSINOPHIL # BLD AUTO: 0.06 10*3/MM3 (ref 0–0.4)
EOSINOPHIL NFR BLD AUTO: 1.1 % (ref 0.3–6.2)
ERYTHROCYTE [DISTWIDTH] IN BLOOD BY AUTOMATED COUNT: 14.7 % (ref 12.3–15.4)
HCT VFR BLD AUTO: 32.7 % (ref 34–46.6)
HGB BLD-MCNC: 10.5 G/DL (ref 12–15.9)
IMM GRANULOCYTES # BLD AUTO: 0.02 10*3/MM3 (ref 0–0.05)
IMM GRANULOCYTES NFR BLD AUTO: 0.4 % (ref 0–0.5)
LYMPHOCYTES # BLD AUTO: 0.85 10*3/MM3 (ref 0.7–3.1)
LYMPHOCYTES NFR BLD AUTO: 16.1 % (ref 19.6–45.3)
MCH RBC QN AUTO: 27.7 PG (ref 26.6–33)
MCHC RBC AUTO-ENTMCNC: 32.1 G/DL (ref 31.5–35.7)
MCV RBC AUTO: 86.3 FL (ref 79–97)
MONOCYTES # BLD AUTO: 0.37 10*3/MM3 (ref 0.1–0.9)
MONOCYTES NFR BLD AUTO: 7 % (ref 5–12)
NEUTROPHILS NFR BLD AUTO: 3.96 10*3/MM3 (ref 1.7–7)
NEUTROPHILS NFR BLD AUTO: 75 % (ref 42.7–76)
NRBC BLD AUTO-RTO: 0 /100 WBC (ref 0–0.2)
PLATELET # BLD AUTO: 152 10*3/MM3 (ref 140–450)
PMV BLD AUTO: 9.8 FL (ref 6–12)
RBC # BLD AUTO: 3.79 10*6/MM3 (ref 3.77–5.28)
WBC NRBC COR # BLD: 5.28 10*3/MM3 (ref 3.4–10.8)

## 2023-08-19 PROCEDURE — 25010000002 KETOROLAC TROMETHAMINE PER 15 MG: Performed by: OBSTETRICS & GYNECOLOGY

## 2023-08-19 PROCEDURE — 25010000002 ENOXAPARIN PER 10 MG: Performed by: OBSTETRICS & GYNECOLOGY

## 2023-08-19 PROCEDURE — 85025 COMPLETE CBC W/AUTO DIFF WBC: CPT | Performed by: OBSTETRICS & GYNECOLOGY

## 2023-08-19 RX ADMIN — DOCUSATE SODIUM 100 MG: 100 CAPSULE, LIQUID FILLED ORAL at 08:17

## 2023-08-19 RX ADMIN — KETOROLAC TROMETHAMINE 15 MG: 15 INJECTION, SOLUTION INTRAMUSCULAR; INTRAVENOUS at 10:41

## 2023-08-19 RX ADMIN — IBUPROFEN 600 MG: 600 TABLET ORAL at 23:29

## 2023-08-19 RX ADMIN — KETOROLAC TROMETHAMINE 15 MG: 15 INJECTION, SOLUTION INTRAMUSCULAR; INTRAVENOUS at 04:51

## 2023-08-19 RX ADMIN — OXYCODONE HYDROCHLORIDE 10 MG: 10 TABLET ORAL at 08:17

## 2023-08-19 RX ADMIN — OXYCODONE HYDROCHLORIDE 10 MG: 10 TABLET ORAL at 12:32

## 2023-08-19 RX ADMIN — ENOXAPARIN SODIUM 40 MG: 100 INJECTION SUBCUTANEOUS at 18:31

## 2023-08-19 RX ADMIN — ACETAMINOPHEN 1000 MG: 500 TABLET ORAL at 08:07

## 2023-08-19 RX ADMIN — ACETAMINOPHEN 650 MG: 325 TABLET ORAL at 14:57

## 2023-08-19 RX ADMIN — PRENATAL VITAMINS-IRON FUMARATE 27 MG IRON-FOLIC ACID 0.8 MG TABLET 1 TABLET: at 08:07

## 2023-08-19 RX ADMIN — SIMETHICONE 80 MG: 80 TABLET, CHEWABLE ORAL at 20:04

## 2023-08-19 RX ADMIN — OXYCODONE HYDROCHLORIDE 10 MG: 10 TABLET ORAL at 23:29

## 2023-08-19 RX ADMIN — OXYCODONE HYDROCHLORIDE 10 MG: 10 TABLET ORAL at 18:41

## 2023-08-19 RX ADMIN — SERTRALINE 50 MG: 50 TABLET, FILM COATED ORAL at 08:07

## 2023-08-19 RX ADMIN — DOCUSATE SODIUM 100 MG: 100 CAPSULE, LIQUID FILLED ORAL at 20:04

## 2023-08-19 RX ADMIN — ACETAMINOPHEN 1000 MG: 500 TABLET ORAL at 02:29

## 2023-08-19 RX ADMIN — ACETAMINOPHEN 650 MG: 325 TABLET ORAL at 20:03

## 2023-08-19 RX ADMIN — IBUPROFEN 600 MG: 600 TABLET ORAL at 16:36

## 2023-08-19 NOTE — LACTATION NOTE
"   08/19/23 1745   Maternal Information   Date of Referral 08/19/23   Person Making Referral lactation consultant   Maternal Reason for Referral breastfeeding currently   Infant Reason for Referral tight frenulum   Maternal Assessment   Breast Shape Bilateral:;round   Breast Density Bilateral:;soft   Nipples Bilateral:;everted   Left Nipple Symptoms intact;nontender   Right Nipple Symptoms intact;nontender   Maternal Infant Feeding   Maternal Emotional State receptive;relaxed   Infant Positioning cross-cradle  (left)   Pain with Feeding no   Comfort Measures Before/During Feeding infant position adjusted   Nipple Shape After Feeding, Left Breast round;symmetrical;appropriately projected   Latch Assistance minimal assistance   Support Person Involvement actively supporting mother;invited to assist with feeding   Breast Pumping   Breast Pumping Interventions other (see comments)  (encouraged to pump for short or missed feedings)     Courtesy follow up visit. MOB concerned about possible tongue and lip ties. 1yo daughter has tongue, lip, and cheek tie revision scheduled. Baby's frenulum is tight, but seems to be able to move tongue over gum line and suck well. Upper lip frenulum is thick, but lip flanges out easily. Patient asked for list of providers that perform revisions; list provided. Patient asking to see SLP before discharge, asked nurse to place consult.    Assisted MOB to place baby in cross cradle on left side. Minor adjustments made to baby's position to achieve \"belly to belly, nipple to nose\" for deep latch. Baby latched on well, MOB reported latch was comfortable throughout 25 minute feeding. Audible swallows and deep jaw excursions noted. Nipple round, not reddened, after baby came off breast. Baby content, asleep, skin to skin with MOB. Encouraged to call as needs arise.   "

## 2023-08-19 NOTE — ANESTHESIA POSTPROCEDURE EVALUATION
Patient: Sabra Smart    Procedure Summary       Date: 23 Room / Location: [facility] LABOR DELIVERY   [facility] LABOR DELIVERY    Anesthesia Start: 740 Anesthesia Stop: 900    Procedure:  SECTION REPEAT (Abdomen) Diagnosis:     Surgeons: Adwoa Lawrence MD Provider: Babar Keating MD    Anesthesia Type: spinal, ITN ASA Status: 3            Anesthesia Type: spinal, ITN    Vitals  Vitals Value Taken Time   /62 23 1610   Temp 98.8 øF (37.1 øC) 23 1610   Pulse 83 23 1610   Resp 18 23 1610   SpO2 100 % 23 0953           Post Anesthesia Care and Evaluation    Patient location during evaluation: bedside  Patient participation: complete - patient participated  Level of consciousness: awake  Pain score: 4  Pain management: satisfactory to patient    Airway patency: patent  Anesthetic complications: No anesthetic complications  PONV Status: none  Cardiovascular status: acceptable and hemodynamically stable  Respiratory status: acceptable  Hydration status: acceptable  Post Neuraxial Block status: Motor and sensory function returned to baseline and No signs or symptoms of PDPH

## 2023-08-20 PROCEDURE — 25010000002 ENOXAPARIN PER 10 MG: Performed by: OBSTETRICS & GYNECOLOGY

## 2023-08-20 RX ADMIN — SIMETHICONE 80 MG: 80 TABLET, CHEWABLE ORAL at 20:21

## 2023-08-20 RX ADMIN — OXYCODONE HYDROCHLORIDE 10 MG: 10 TABLET ORAL at 03:40

## 2023-08-20 RX ADMIN — SERTRALINE 50 MG: 50 TABLET, FILM COATED ORAL at 08:50

## 2023-08-20 RX ADMIN — OXYCODONE HYDROCHLORIDE 5 MG: 5 TABLET ORAL at 21:14

## 2023-08-20 RX ADMIN — ACETAMINOPHEN 650 MG: 325 TABLET ORAL at 02:34

## 2023-08-20 RX ADMIN — IBUPROFEN 600 MG: 600 TABLET ORAL at 17:32

## 2023-08-20 RX ADMIN — PRENATAL VITAMINS-IRON FUMARATE 27 MG IRON-FOLIC ACID 0.8 MG TABLET 1 TABLET: at 08:50

## 2023-08-20 RX ADMIN — DOCUSATE SODIUM 100 MG: 100 CAPSULE, LIQUID FILLED ORAL at 20:21

## 2023-08-20 RX ADMIN — IBUPROFEN 600 MG: 600 TABLET ORAL at 10:31

## 2023-08-20 RX ADMIN — ACETAMINOPHEN 650 MG: 325 TABLET ORAL at 13:39

## 2023-08-20 RX ADMIN — ACETAMINOPHEN 650 MG: 325 TABLET ORAL at 08:50

## 2023-08-20 RX ADMIN — ENOXAPARIN SODIUM 40 MG: 100 INJECTION SUBCUTANEOUS at 17:32

## 2023-08-20 RX ADMIN — Medication 1 APPLICATION: at 20:21

## 2023-08-20 RX ADMIN — ACETAMINOPHEN 650 MG: 325 TABLET ORAL at 20:21

## 2023-08-20 RX ADMIN — IBUPROFEN 600 MG: 600 TABLET ORAL at 23:33

## 2023-08-20 RX ADMIN — IBUPROFEN 600 MG: 600 TABLET ORAL at 05:28

## 2023-08-20 RX ADMIN — ALUMINUM HYDROXIDE, MAGNESIUM HYDROXIDE, AND DIMETHICONE 15 ML: 400; 400; 40 SUSPENSION ORAL at 00:34

## 2023-08-20 NOTE — PROGRESS NOTES
Postpartum Progress Note    Patient name: Sabra Smart  YOB: 1995   MRN: [id number]  Referring Provider: Adwoa Lawrence MD  Admission Date: 2023  Date of Service: 2023    ID: 27 y.o.     Diagnosis:   S/p  delivery 2 Days Post-Op     Rh negative, antepartum    Obesity in pregnancy, antepartum    Pregnancy    Delivery of pregnancy by  section       Subjective:      No complaints.  Moderate lochia.  Ambulating, voiding, tolerating diet.  Pain well controlled.  The patient is currently breastfeeding.   This baby is a male    Objective:      Vital signs:  Vital Signs Range for the last 24 hours  Temperature: Temp:  [98 øF (36.7 øC)-98.8 øF (37.1 øC)] 98 øF (36.7 øC)   Temp Source: Temp src: Oral   BP: BP: (105-132)/(56-65) 105/58   Pulse: Heart Rate:  [68-85] 68   Respirations: Resp:  [16-18] 16   Weight: (!) 154 kg (339 lb 8.1 oz)     General: Alert & oriented x4, in no apparent distress  Abdomen: soft, nontender  Uterus: firm, nontender  Incision: clean, dry, intact, dressing clean, sutures  Extremities: nontender; no edema      Labs:  Lab Results   Component Value Date    WBC 5.28 2023    HGB 10.5 (L) 2023    HCT 32.7 (L) 2023    MCV 86.3 2023     2023     Results from last 7 days   Lab Units 23  1756   ABO TYPING  A   RH TYPING  Negative     External Prenatal Results       Pregnancy Outside Results - Transcribed From Office Records - See Scanned Records For Details       Test Value Date Time    ABO  A  23 1756    Rh  Negative  23 1756    Antibody Screen  Negative  08/15/23 1256       Negative  23 1056       Positive  01/10/23 1228       See Final Results  01/10/23 1228    Varicella IgG       Rubella  1.89 index 01/10/23 1228    Hgb  10.5 g/dL 23 0814       11.7 g/dL 08/15/23 1256       11.5 g/dL 23 1056       13.0 g/dL 01/10/23 1228       13.1 g/dL 22 1009       13.1 g/dL  22 1009    Hct  32.7 % 23 0814       35.7 % 08/15/23 1256       36.1 % 23 1056       40.3 % 01/10/23 1228       41.1 % 22 1009       41.1 % 22 1009    Glucose Fasting GTT ^ 72 mg/dL 18     Glucose Tolerance Test 1 hour ^ 135  18     Glucose Tolerance Test 3 hour ^ 96  18     Gonorrhea (discrete)  Negative  01/10/23 1228    Chlamydia (discrete)  Negative  01/10/23 1228    RPR  Non Reactive  01/10/23 1228    VDRL       Syphilis Antibody       HBsAg  Negative  01/10/23 1228    Herpes Simplex Virus PCR       Herpes Simplex VIrus Culture       HIV  Non Reactive  01/10/23 1228    Hep C RNA Quant PCR       Hep C Antibody  <0.1 s/co ratio 01/10/23 1228    AFP  38.9 ng/mL 23 1212    Group B Strep  No Group B Streptococcus isolated  23 1914    GBS Susceptibility to Clindamycin       GBS Susceptibility to Erythromycin       Fetal Fibronectin       Genetic Testing, Maternal Blood                 Drug Screening       Test Value Date Time    Urine Drug Screen       Amphetamine Screen  Negative  23 0950    Barbiturate Screen  Negative  23 0950    Benzodiazepine Screen  Negative  23 0950    Methadone Screen  Negative  23 0950    Phencyclidine Screen  Negative  23 0950    Opiates Screen  Negative  23 0950    THC Screen  Negative  23 0950    Cocaine Screen       Propoxyphene Screen  Negative  23 0950    Buprenorphine Screen  Negative  23 0950    Methamphetamine Screen       Oxycodone Screen  Negative  23 0950    Tricyclic Antidepressants Screen  Negative  23 0950              Legend    ^: Historical                            Assessment/Plan:      2 Days Post-Op s/p Procedure(s):   SECTION REPEAT  1. S/p  delivery: Continue postoperative care.  Doing well.  2. Infant feeding: Supportive care.  The patient is currently breastfeeding.  3. Anxiety/Depression: Continue zoloft  4. Dvt prophylaxis:  Lovenox  5. Infant male desires circumcision.   6. RH negative-rhogam given 8/18  7. Plan for discharge tomorrow.

## 2023-08-20 NOTE — LACTATION NOTE
08/20/23 1715   Maternal Information   Date of Referral 08/20/23   Person Making Referral lactation consultant   Maternal Reason for Referral breastfeeding currently   Maternal Assessment   Breast Shape Bilateral:;round   Breast Density Bilateral:;soft   Nipples Bilateral:;everted   Left Nipple Symptoms intact;nontender   Right Nipple Symptoms intact;nontender   Maternal Infant Feeding   Maternal Emotional State receptive;relaxed   Infant Positioning cross-cradle  (right.  Mother stated this is the hard side to breastfeed on.  Encouraged her to move infant more to be infront of the nipple, to make the latch more comfortable.)   Signs of Milk Transfer deep jaw excursions noted;audible swallow   Pain with Feeding no   Comfort Measures Before/During Feeding infant position adjusted;maternal position adjusted;suction broken using finger   Nipple Shape After Feeding, Right round;symmetrical   Latch Assistance verbal guidance offered   Support Person Involvement other (see comments)  (not present at bedside)   Milk Expression/Equipment   Breast Pump Type double electric, hospital grade   Breast Pumping   Breast Pumping Interventions post-feed pumping encouraged  (encouraged mother to pump after every feeding and feed expressed breast milk to infant.)     Courtesy revisit with breastfeeding mother.  Adjusted infant positon and mother stated that latch felt better.  Due to infant's weight loss of greater than 8%  encouraged mother to pump after every feed and supplement with expressed breastmilk, until her milk comes in.  All concerns/questions answered at this time.

## 2023-08-21 VITALS
SYSTOLIC BLOOD PRESSURE: 124 MMHG | OXYGEN SATURATION: 100 % | HEIGHT: 70 IN | RESPIRATION RATE: 16 BRPM | WEIGHT: 293 LBS | BODY MASS INDEX: 41.95 KG/M2 | DIASTOLIC BLOOD PRESSURE: 72 MMHG | TEMPERATURE: 98.9 F | HEART RATE: 77 BPM

## 2023-08-21 PROBLEM — Z34.90 PREGNANCY: Status: RESOLVED | Noted: 2023-07-26 | Resolved: 2023-08-21

## 2023-08-21 PROCEDURE — 0503F POSTPARTUM CARE VISIT: CPT | Performed by: ADVANCED PRACTICE MIDWIFE

## 2023-08-21 RX ORDER — PSEUDOEPHEDRINE HCL 30 MG
100 TABLET ORAL 2 TIMES DAILY PRN
Qty: 30 CAPSULE | Refills: 0 | Status: SHIPPED | OUTPATIENT
Start: 2023-08-21

## 2023-08-21 RX ORDER — IBUPROFEN 600 MG/1
600 TABLET ORAL EVERY 6 HOURS
Qty: 90 TABLET | Refills: 0 | Status: SHIPPED | OUTPATIENT
Start: 2023-08-21

## 2023-08-21 RX ORDER — OXYCODONE HYDROCHLORIDE 5 MG/1
5 TABLET ORAL EVERY 6 HOURS PRN
Qty: 12 TABLET | Refills: 0 | Status: SHIPPED | OUTPATIENT
Start: 2023-08-21 | End: 2023-08-25

## 2023-08-21 RX ADMIN — ACETAMINOPHEN 650 MG: 325 TABLET ORAL at 02:01

## 2023-08-21 RX ADMIN — ACETAMINOPHEN 650 MG: 325 TABLET ORAL at 08:26

## 2023-08-21 RX ADMIN — SERTRALINE 50 MG: 50 TABLET, FILM COATED ORAL at 08:26

## 2023-08-21 RX ADMIN — IBUPROFEN 600 MG: 600 TABLET ORAL at 05:07

## 2023-08-21 RX ADMIN — IBUPROFEN 600 MG: 600 TABLET ORAL at 10:32

## 2023-08-21 RX ADMIN — PRENATAL VITAMINS-IRON FUMARATE 27 MG IRON-FOLIC ACID 0.8 MG TABLET 1 TABLET: at 08:26

## 2023-08-21 NOTE — DISCHARGE SUMMARY
Discharge Summary    Date of Admission: 2023  Date of Discharge:  2023      Patient: Sabra Smart      MR#:[id number]    Primary Surgeon/OB: Adwoa Lawrence MD    Discharge Surgeon/OB:chente    Presenting Problem/History of Present Illness  Pregnancy [Z34.90]       Rh negative, antepartum    Obesity in pregnancy, antepartum    Delivery of pregnancy by  section         Discharge Diagnosis:  section at 39w3d    Procedures:  , Low Transverse     2023    8:13 AM        Rh Immune globulin given: yes        Discharge Date: 2023; Discharge Time: 10:24 EDT    Early Discharge:  NO    Hospital Course  Patient is a 27 y.o. female  at 39w3d status post  section with uneventful postoperative recovery.  Patient was advanced to regular diet on postoperative day#1.  On discharge, ambulating, tolerating a regular diet without any difficulties and her incision is dry, clean and intact.     Infant:   male  fetus 3795 g (8 lb 5.9 oz)  with Apgar scores of 8 , 9  at five minutes.    Condition on Discharge:  Stable    Vital Signs  Temp:  [97.9 øF (36.6 øC)-98.9 øF (37.2 øC)] 98.9 øF (37.2 øC)  Heart Rate:  [73-77] 77  Resp:  [16-20] 16  BP: (124-132)/(61-72) 124/72    Lab Results   Component Value Date    WBC 5.28 2023    HGB 10.5 (L) 2023    HCT 32.7 (L) 2023    MCV 86.3 2023     2023       Discharge Disposition  Home or Self Care    Discharge Medications     Discharge Medications        New Medications        Instructions Start Date   docusate sodium 100 MG capsule   100 mg, Oral, 2 Times Daily PRN      ibuprofen 600 MG tablet  Commonly known as: ADVIL,MOTRIN   600 mg, Oral, Every 6 Hours      oxyCODONE 5 MG immediate release tablet  Commonly known as: ROXICODONE   5 mg, Oral, Every 6 Hours PRN             Continue These Medications        Instructions Start Date   Prenatal 27-1 27-1 MG tablet tablet   1 tablet, Oral, Daily       sertraline 50 MG tablet  Commonly known as: Zoloft   50 mg, Oral, Daily               Discharge Diet: Regular    Activity at Discharge:   Activity Instructions       Bathing Restrictions      Type of Restriction: Bathing    Bathing Restrictions: No Tub Bath    Driving Restrictions      Type of Restriction: Driving    Driving Restrictions: No Driving Until Next Appointment    Lifting Restrictions      Type of Restriction: Lifting    Lifting Restrictions: Lifting Restriction (Indicate Limit)    Weight Limit (Pounds): 10    Length of Lifting Restriction: 6 weeks    Sexual Activity Restrictions      Type of Restriction: Sex    Explain Sexual Activity Restrictions: No sex until after postpartum appt    Work Restrictions      Type of Restriction: Work    May Return to Work: Other    Return to Work Instructions: May return to work in 8 weeks            Follow-up Appointments  Future Appointments   Date Time Provider Department Center   8/29/2023  4:45 PM Nae Wilkerson APRN [facility]  [facility]     Additional Instructions for the Follow-ups that You Need to Schedule       Call MD With Problems / Concerns   As directed      Call with foul smelling discharge, fever of >100.4, signs of postpartum depression, saturating a pad in <1 hour, blood clots larger than a golf ball.    Order Comments: Call with foul smelling discharge, fever of >100.4, signs of postpartum depression, saturating a pad in <1 hour, blood clots larger than a golf ball.                 BILL Roque  08/21/23  10:23 EDT  [facility]

## 2023-08-21 NOTE — LACTATION NOTE
08/21/23 1030   Maternal Information   Date of Referral 08/21/23   Person Making Referral lactation consultant  (follow up prior to discharge)   Maternal Reason for Referral other (see comments)  (mother reports she is breastfeeding well, but feels like breasts are not being emptied with breastfeeding so she is pumping also;  tender, requested nipple shield just in case for home use; pumped and provided 45ml EBM 1.5hr prior to LC visit;)   Infant Reason for Referral weight gain inadequate  (encouraged pumping and paced bottle feeding for weight loss)   Maternal Assessment   Breast Size Issue none   Breast Shape Bilateral:;round   Breast Density Bilateral:;soft   Nipples Bilateral:;short;protractile   Left Nipple Symptoms redness   Right Nipple Symptoms redness   Maternal Infant Feeding   Maternal Emotional State independent;receptive;relaxed   Comfort Measures Before/During Feeding other (see comments)  (medium nipple shield with proper placemend education provided)   Additional Documentation Breastfeeding Supplementation (Group)   Breastfeeding Supplementation   Method of Supplementation paced bottle   Nipple Used For Supplementation preemie  (at bedside)   Milk Expression/Equipment   Breast Pump Type double electric, hospital grade;double electric, personal   Breast Pumping   Breast Pumping Interventions post-feed pumping encouraged  (for short/missed feedings, if supplementation is required, or if breastfeeding becomes too painful, to encourage breastmilk production)   Lactation Referrals   Lactation Referrals outpatient lactation program     All questions answered at this time. PRN Lactation Consultant/Clinic contact encouraged.

## 2023-08-23 ENCOUNTER — HOSPITAL ENCOUNTER (OUTPATIENT)
Facility: HOSPITAL | Age: 28
Setting detail: OBSERVATION
Discharge: HOME | End: 2023-08-24
Attending: OBSTETRICS & GYNECOLOGY | Admitting: OBSTETRICS & GYNECOLOGY
Payer: COMMERCIAL

## 2023-08-23 ENCOUNTER — TELEPHONE (OUTPATIENT)
Dept: OBSTETRICS AND GYNECOLOGY | Facility: CLINIC | Age: 28
End: 2023-08-23
Payer: COMMERCIAL

## 2023-08-23 ENCOUNTER — POSTPARTUM VISIT (OUTPATIENT)
Dept: OBSTETRICS AND GYNECOLOGY | Facility: CLINIC | Age: 28
End: 2023-08-23
Payer: COMMERCIAL

## 2023-08-23 VITALS
DIASTOLIC BLOOD PRESSURE: 100 MMHG | SYSTOLIC BLOOD PRESSURE: 148 MMHG | HEIGHT: 70 IN | BODY MASS INDEX: 41.95 KG/M2 | WEIGHT: 293 LBS

## 2023-08-23 VITALS — DIASTOLIC BLOOD PRESSURE: 75 MMHG | SYSTOLIC BLOOD PRESSURE: 124 MMHG

## 2023-08-23 LAB
ALP SERPL-CCNC: 108 U/L (ref 39–117)
ALT SERPL W P-5'-P-CCNC: 19 U/L (ref 1–33)
AST SERPL-CCNC: 14 U/L (ref 1–32)
BILIRUB SERPL-MCNC: 0.2 MG/DL (ref 0–1.2)
CREAT SERPL-MCNC: 0.72 MG/DL (ref 0.57–1)
LDH SERPL-CCNC: 209 U/L (ref 135–214)
URATE SERPL-MCNC: 5.2 MG/DL (ref 2.4–5.7)

## 2023-08-23 PROCEDURE — G0463 HOSPITAL OUTPT CLINIC VISIT: HCPCS

## 2023-08-23 PROCEDURE — 82247 BILIRUBIN TOTAL: CPT | Performed by: OBSTETRICS & GYNECOLOGY

## 2023-08-23 PROCEDURE — 82565 ASSAY OF CREATININE: CPT | Performed by: OBSTETRICS & GYNECOLOGY

## 2023-08-23 PROCEDURE — 84550 ASSAY OF BLOOD/URIC ACID: CPT | Performed by: OBSTETRICS & GYNECOLOGY

## 2023-08-23 PROCEDURE — 25010000002 KETOROLAC TROMETHAMINE PER 15 MG: Performed by: OBSTETRICS & GYNECOLOGY

## 2023-08-23 PROCEDURE — 36415 COLL VENOUS BLD VENIPUNCTURE: CPT | Performed by: OBSTETRICS & GYNECOLOGY

## 2023-08-23 PROCEDURE — 96374 THER/PROPH/DIAG INJ IV PUSH: CPT

## 2023-08-23 PROCEDURE — 84075 ASSAY ALKALINE PHOSPHATASE: CPT | Performed by: OBSTETRICS & GYNECOLOGY

## 2023-08-23 PROCEDURE — 84460 ALANINE AMINO (ALT) (SGPT): CPT | Performed by: OBSTETRICS & GYNECOLOGY

## 2023-08-23 PROCEDURE — 83615 LACTATE (LD) (LDH) ENZYME: CPT | Performed by: OBSTETRICS & GYNECOLOGY

## 2023-08-23 PROCEDURE — 84450 TRANSFERASE (AST) (SGOT): CPT | Performed by: OBSTETRICS & GYNECOLOGY

## 2023-08-23 RX ORDER — KETOROLAC TROMETHAMINE 30 MG/ML
60 INJECTION, SOLUTION INTRAMUSCULAR; INTRAVENOUS ONCE
Status: COMPLETED | OUTPATIENT
Start: 2023-08-23 | End: 2023-08-23

## 2023-08-23 RX ORDER — IBUPROFEN 800 MG/1
800 TABLET ORAL ONCE
Status: DISCONTINUED | OUTPATIENT
Start: 2023-08-23 | End: 2023-08-23

## 2023-08-23 RX ADMIN — KETOROLAC TROMETHAMINE 60 MG: 30 INJECTION, SOLUTION INTRAMUSCULAR; INTRAVENOUS at 20:22

## 2023-08-23 NOTE — PROGRESS NOTES
"      Chief Complaint   Patient presents with    Postpartum Care       Postpartum problems visit       Sabra Smart is a 27 y.o.  who is s/p , Low Transverse    Information for the patient's :  Jarrett Smart [[id number]]   2023   male   Jarrett Smart   3795 g (8 lb 5.9 oz)   Gestational Age: 39w3d    Baby Discharged: Discharged with Mom  Delivering Physician: Adwoa Lawrence MD    She presents today for blood pressure check.    The patient complains of  headache that started yesterday morning. She reports increasing fluids and taking ibuprofen with temporary relief. Headache does worsen when lying flat. At home her her BP was 140/97 and she does report dizziness.  She denies swelling or epigastric pain.    Recheck 142/88    Patient describes bleeding as light.  Patient is breast and bottle feeding. She denies bowel or bladder issues.        The additional following portions of the patient's history were reviewed and updated as appropriate: allergies, current medications, past family history, past medical history, past social history, past surgical history, and problem list.      Review of Systems   Constitutional: Negative.    Respiratory: Negative.     Cardiovascular: Negative.    Gastrointestinal: Negative.    Genitourinary:  Positive for vaginal bleeding.   Neurological:  Positive for light-headedness and headache.   Psychiatric/Behavioral:  The patient is nervous/anxious.      I have reviewed and agree with the HPI, ROS, and historical information as entered above. Joy Mistry, APRN      Objective   /100   Ht 177.8 cm (70\")   Wt (!) 151 kg (332 lb 9.6 oz)   LMP 2022 (Approximate)   Breastfeeding Yes   BMI 47.72 kg/mý     Physical Exam  Vitals and nursing note reviewed.   Constitutional:       Appearance: Normal appearance.   Cardiovascular:      Rate and Rhythm: Normal rate and regular rhythm.   Pulmonary:      Effort: Pulmonary effort is normal.      " Breath sounds: Normal breath sounds.   Musculoskeletal:         General: Normal range of motion.      Right lower le+ Pitting Edema present.      Left lower le+ Pitting Edema present.   Neurological:      Mental Status: She is alert and oriented to person, place, and time.      Deep Tendon Reflexes: Reflexes abnormal.      Reflex Scores:       Patellar reflexes are 1+ on the right side and 3+ on the left side.           Assessment and Plan    Problem List Items Addressed This Visit    None  Visit Diagnoses       Hypertension in pregnancy, transient, postpartum    -  Primary    Relevant Orders    Preeclampsia Panel            5 days S/p , with hypertension. Discussed with Conrad Donato, will check PEP labs today and have pt check her BP twice daily at home and as needed if symptomatic. Try tylenol for h/a instead. F/u for BP check Friday.  Preeclampsia precautions reviewed.   Continued pelvic rest.   Return in about 2 days (around 2023) for BP check .    Joy Mistry, BILL  2023

## 2023-08-23 NOTE — TELEPHONE ENCOUNTER
GABRIELA pt     Pt 5 day PP. R C/S 8/18/23 via [facility]  See [employer] message encounter.     SW pt. She reports HA that started yesterday morning. She reports increasing fluids and taking ibuprofen with temporary relief. HA does worsen when lying flat. At home her her /97 and she does report dizziness. Appt made today with Joy Mistry for BP check, Pt VU.

## 2023-08-24 PROBLEM — R51.9 HEADACHE IN PREGNANCY, DELIVERED WITH POSTPARTUM COMPLICATION: Status: ACTIVE | Noted: 2023-08-24

## 2023-08-24 PROBLEM — O26.899 HEADACHE IN PREGNANCY, DELIVERED WITH POSTPARTUM COMPLICATION: Status: ACTIVE | Noted: 2023-08-24

## 2023-08-24 LAB
ALBUMIN/CREAT UR: 6 MG/G CREAT (ref 0–29)
ALT SERPL-CCNC: 22 IU/L (ref 0–32)
AST SERPL-CCNC: 19 IU/L (ref 0–40)
BASOPHILS # BLD AUTO: 0 X10E3/UL (ref 0–0.2)
BASOPHILS NFR BLD AUTO: 0 %
BUN SERPL-MCNC: 13 MG/DL (ref 6–20)
CREAT SERPL-MCNC: 0.83 MG/DL (ref 0.57–1)
CREAT UR-MCNC: 88.3 MG/DL
EGFRCR SERPLBLD CKD-EPI 2021: 100 ML/MIN/1.73
EOSINOPHIL # BLD AUTO: 0.2 X10E3/UL (ref 0–0.4)
EOSINOPHIL NFR BLD AUTO: 3 %
ERYTHROCYTE [DISTWIDTH] IN BLOOD BY AUTOMATED COUNT: 14.4 % (ref 11.7–15.4)
HCT VFR BLD AUTO: 31.6 % (ref 34–46.6)
HGB BLD-MCNC: 10.3 G/DL (ref 11.1–15.9)
IMM GRANULOCYTES # BLD AUTO: 0.1 X10E3/UL (ref 0–0.1)
IMM GRANULOCYTES NFR BLD AUTO: 1 %
LDH SERPL L TO P-CCNC: 242 IU/L (ref 119–226)
LYMPHOCYTES # BLD AUTO: 1.1 X10E3/UL (ref 0.7–3.1)
LYMPHOCYTES NFR BLD AUTO: 17 %
MCH RBC QN AUTO: 27.6 PG (ref 26.6–33)
MCHC RBC AUTO-ENTMCNC: 32.6 G/DL (ref 31.5–35.7)
MCV RBC AUTO: 85 FL (ref 79–97)
MICROALBUMIN UR-MCNC: 5.4 UG/ML
MONOCYTES # BLD AUTO: 0.5 X10E3/UL (ref 0.1–0.9)
MONOCYTES NFR BLD AUTO: 8 %
NEUTROPHILS # BLD AUTO: 4.7 X10E3/UL (ref 1.4–7)
NEUTROPHILS NFR BLD AUTO: 71 %
PLATELET # BLD AUTO: 280 X10E3/UL (ref 150–450)
RBC # BLD AUTO: 3.73 X10E6/UL (ref 3.77–5.28)
URATE SERPL-MCNC: 5.3 MG/DL (ref 2.6–6.2)
WBC # BLD AUTO: 6.6 X10E3/UL (ref 3.4–10.8)

## 2023-08-24 NOTE — H&P
Sabra Smart  1995  [id number]  [id number]    CC: headache  HPI:  Patient is 27 y.o. female   POD #5 presents with c/o headache, onset yesterday.  Pt woke up with HA again this am.  Pt took BP and they were in the 140's over 90's range.  Pt denies vision changes, N, or V.  Pt is breast feeding and bleeding is scant.  Pt with no hx HTN during or before pregnancy.  HA is occipital, throbbing, and currently rates 2/10.    PMH:  Current meds: PNV, motrin, Tyl  Illnesses: anxiety/depression  Surgeries:  X 2, frenulectomy, ear tubes X 2, knee arthroscopy, oral surg  Allergies: NKDA    Past OB History:       OB History    Para Term  AB Living   4 3 3 0 1 3   SAB IAB Ectopic Molar Multiple Live Births   0 0 1 0 0 3      # Outcome Date GA Lbr Carl/2nd Weight Sex Delivery Anes PTL Lv   4 Term 23 39w3d  3795 g (8 lb 5.9 oz) M CS-LTranv Spinal N CARLY      Name: ADRIEN SMARTWILLIAMJUAN      Apgar1: 8  Apgar5: 9   3 Term 22 39w0d  3799 g (8 lb 6 oz) F CS-LTranv   CARLY   2 Ectopic 20           1 Term 19 39w2d 11:05 / 02:50 3655 g (8 lb 0.9 oz) F Vag-Spont EPI N CARLY      Complications: Shoulder Dystocia      Name: CASTILLONNEKA      Apgar1: 5  Apgar5: 9            SH: tob neg , EtOH neg, drugs neg      General ROS: HA, edema.   All other systems reviewed and are negative.      Physical Examination: General appearance - alert, well appearing, and in no distress  Vital signs - /75   LMP 2022 (Approximate)   HEENT: normocephalic, atraumatic,oropharynx clear, appearance of ears and nose normal  Neck - supple, no significant adenopathy, no thyromegaly  Lymphatics - no palpable lymphadenopathy in the neck or groin, no hepatosplenomegaly  Chest - clear to auscultation, no wheezes, rales or rhonchi, respiratory effort non-labored  Heart - normal rate, regular rhythm, no murmurs, rubs, clicks or gallops, no JVD, trace lower extremity edema  Abdomen - soft, nontender,  nondistended, no masses, no hepatosplenomegaly  no rebound tenderness noted, bowel sounds normal, inc D and I  Extremities - trace pedal edema noted, no calf tend  Skin -warm and dry, normal coloration and turgor, no rashes, no suspicious skin lesions noted      Labs:  Results from last 7 days   Lab Units 23  0814   WBC 10*3/mm3 5.28   HEMOGLOBIN g/dL 10.5*   HEMATOCRIT % 32.7*   PLATELETS 10*3/mm3 152     Results from last 7 days   Lab Units 23   CREATININE mg/dL 0.72   BILIRUBIN mg/dL 0.2   ALK PHOS U/L 108   ALT (SGPT) U/L 19   AST (SGOT) U/L 14       Assessment 1)headache- BP's in mild range here.  Pt rates HA 2/10 currently, labs normal   2)POD #5 from     Plan 1)observe   2)Toradol IM   3)serial BP's   4)home if HA better and BP's stable    Bijan Arteaga MD  2023  03:10 EDT

## 2023-08-25 ENCOUNTER — POSTPARTUM VISIT (OUTPATIENT)
Dept: OBSTETRICS AND GYNECOLOGY | Facility: CLINIC | Age: 28
End: 2023-08-25
Payer: COMMERCIAL

## 2023-08-25 ENCOUNTER — APPOINTMENT (OUTPATIENT)
Dept: CT IMAGING | Facility: HOSPITAL | Age: 28
End: 2023-08-25
Payer: COMMERCIAL

## 2023-08-25 ENCOUNTER — HOSPITAL ENCOUNTER (OUTPATIENT)
Facility: HOSPITAL | Age: 28
Setting detail: OBSERVATION
Discharge: HOME | End: 2023-08-25
Attending: OBSTETRICS & GYNECOLOGY | Admitting: OBSTETRICS & GYNECOLOGY
Payer: COMMERCIAL

## 2023-08-25 ENCOUNTER — HOSPITAL ENCOUNTER (EMERGENCY)
Facility: HOSPITAL | Age: 28
Discharge: STILL ADMITTED OR ADMINISTRATIVE | End: 2023-08-25
Payer: COMMERCIAL

## 2023-08-25 VITALS
HEIGHT: 70 IN | BODY MASS INDEX: 41.95 KG/M2 | SYSTOLIC BLOOD PRESSURE: 142 MMHG | DIASTOLIC BLOOD PRESSURE: 90 MMHG | WEIGHT: 293 LBS

## 2023-08-25 VITALS
DIASTOLIC BLOOD PRESSURE: 74 MMHG | OXYGEN SATURATION: 97 % | RESPIRATION RATE: 18 BRPM | HEART RATE: 63 BPM | TEMPERATURE: 98.9 F | SYSTOLIC BLOOD PRESSURE: 142 MMHG

## 2023-08-25 DIAGNOSIS — R06.02 SHORTNESS OF BREATH: ICD-10-CM

## 2023-08-25 DIAGNOSIS — R07.89 OTHER CHEST PAIN: ICD-10-CM

## 2023-08-25 PROBLEM — R07.9 CHEST PAIN: Status: ACTIVE | Noted: 2023-08-25

## 2023-08-25 LAB
ALP SERPL-CCNC: 131 U/L (ref 39–117)
ALT SERPL W P-5'-P-CCNC: 23 U/L (ref 1–33)
AST SERPL-CCNC: 16 U/L (ref 1–32)
BILIRUB SERPL-MCNC: 0.3 MG/DL (ref 0–1.2)
CREAT SERPL-MCNC: 0.78 MG/DL (ref 0.57–1)
LDH SERPL-CCNC: 378 U/L (ref 135–214)
TROPONIN T SERPL HS-MCNC: 7 NG/L
URATE SERPL-MCNC: 5.9 MG/DL (ref 2.4–5.7)

## 2023-08-25 PROCEDURE — 84460 ALANINE AMINO (ALT) (SGPT): CPT | Performed by: OBSTETRICS & GYNECOLOGY

## 2023-08-25 PROCEDURE — 36415 COLL VENOUS BLD VENIPUNCTURE: CPT | Performed by: OBSTETRICS & GYNECOLOGY

## 2023-08-25 PROCEDURE — 82565 ASSAY OF CREATININE: CPT | Performed by: OBSTETRICS & GYNECOLOGY

## 2023-08-25 PROCEDURE — 84450 TRANSFERASE (AST) (SGOT): CPT | Performed by: OBSTETRICS & GYNECOLOGY

## 2023-08-25 PROCEDURE — 25510000001 IOPAMIDOL PER 1 ML: Performed by: OBSTETRICS & GYNECOLOGY

## 2023-08-25 PROCEDURE — 84550 ASSAY OF BLOOD/URIC ACID: CPT | Performed by: OBSTETRICS & GYNECOLOGY

## 2023-08-25 PROCEDURE — 83615 LACTATE (LD) (LDH) ENZYME: CPT | Performed by: OBSTETRICS & GYNECOLOGY

## 2023-08-25 PROCEDURE — 84484 ASSAY OF TROPONIN QUANT: CPT | Performed by: OBSTETRICS & GYNECOLOGY

## 2023-08-25 PROCEDURE — 71275 CT ANGIOGRAPHY CHEST: CPT

## 2023-08-25 PROCEDURE — G0463 HOSPITAL OUTPT CLINIC VISIT: HCPCS

## 2023-08-25 PROCEDURE — 82247 BILIRUBIN TOTAL: CPT | Performed by: OBSTETRICS & GYNECOLOGY

## 2023-08-25 PROCEDURE — 93005 ELECTROCARDIOGRAM TRACING: CPT | Performed by: OBSTETRICS & GYNECOLOGY

## 2023-08-25 PROCEDURE — 84075 ASSAY ALKALINE PHOSPHATASE: CPT | Performed by: OBSTETRICS & GYNECOLOGY

## 2023-08-25 PROCEDURE — G0378 HOSPITAL OBSERVATION PER HR: HCPCS

## 2023-08-25 RX ORDER — FUROSEMIDE 20 MG/1
20 TABLET ORAL ONCE
Status: COMPLETED | OUTPATIENT
Start: 2023-08-25 | End: 2023-08-25

## 2023-08-25 RX ORDER — SODIUM CHLORIDE 0.9 % (FLUSH) 0.9 %
10 SYRINGE (ML) INJECTION AS NEEDED
Status: DISCONTINUED | OUTPATIENT
Start: 2023-08-25 | End: 2023-08-25 | Stop reason: HOSPADM

## 2023-08-25 RX ORDER — SODIUM CHLORIDE 0.9 % (FLUSH) 0.9 %
10 SYRINGE (ML) INJECTION EVERY 12 HOURS SCHEDULED
Status: DISCONTINUED | OUTPATIENT
Start: 2023-08-25 | End: 2023-08-25 | Stop reason: HOSPADM

## 2023-08-25 RX ADMIN — FUROSEMIDE 20 MG: 20 TABLET ORAL at 17:33

## 2023-08-25 RX ADMIN — IOPAMIDOL 80 ML: 755 INJECTION, SOLUTION INTRAVENOUS at 16:57

## 2023-08-25 NOTE — PROGRESS NOTES
Chief Complaint   Patient presents with    Postpartum Care     1 week       Postpartum Visit         Sabra Smart is a 28 y.o.  who presents today for a 7 day(s) postpartum check.        , Low Transverse    Information for the patient's :  Babar Smart [[id number]]   2023   male   Babar Smart   3795 g (8 lb 5.9 oz)   Gestational Age: 39w3d    Baby Discharged with Mom  Delivering MD: Adwoa Lawrence MD.    Pregnancy complications: no known issues during pregnancy. Elevated BP post delivery.   Patient is here today for blood pressure check. She was seen at L&D on 2023 with c/o HA and elevated BP (140's/90's @ home). She states she was given Toradol that improved HA(s), they monitored BP and she was discharged. Patient states she has not had a HA since then but her BP has still been elevated at home and she just does not feel right. She feels as if something is wrong and is afraid to go to sleep due to fear of not waking up. She has been having intermittent SOA and mid sternal chest pressure and pain between both shoulder blades.       Patient reports her incision is clean, dry, intact. Patient describes vaginal bleeding as absent.  She is breast feeding. Patient denies bowel or bladder issues.    She desires contraceptive methods: Abstinence for contraception.    Patient denies postpartum depression. Postpartum Depression Screening Questionnaire: 13. Currently on Zoloft for anxiety. Denies depression.      The additional following portions of the patient's history were reviewed and updated as appropriate: allergies, current medications, past family history, past medical history, past social history, past surgical history, and problem list.      Review of Systems   Respiratory:  Positive for shortness of breath.    Cardiovascular:  Positive for chest pain.   Gastrointestinal:  Negative for abdominal distention and abdominal pain.   Genitourinary:  Positive for  "vaginal bleeding. Negative for breast discharge, breast lump, breast pain, pelvic pain, pelvic pressure, urgency, urinary incontinence, vaginal discharge and vaginal pain.   Psychiatric/Behavioral:  Negative for suicidal ideas and depressed mood. The patient is nervous/anxious.    All other systems reviewed and are negative.    I have reviewed and agree with the HPI, ROS, and historical information as entered above. Kemi Matias, APRN      Objective   /90 Comment (BP Location): repeat 138/96  Ht 177.8 cm (70\")   Wt (!) 147 kg (325 lb)   LMP 2022 (Approximate)   Breastfeeding Yes   BMI 46.63 kg/mý     Physical Exam  Vitals and nursing note reviewed.   Constitutional:       General: She is not in acute distress.     Appearance: Normal appearance. She is obese. She is not ill-appearing or toxic-appearing.   HENT:      Head: Normocephalic and atraumatic.   Cardiovascular:      Rate and Rhythm: Normal rate and regular rhythm.      Pulses: Normal pulses.      Heart sounds: Normal heart sounds.   Pulmonary:      Effort: Pulmonary effort is normal.      Breath sounds: Normal breath sounds.   Abdominal:      Palpations: Abdomen is soft. There is no mass.      Tenderness: There is abdominal tenderness.      Hernia: No hernia is present.      Comments: LTCS incision well approximated; no drainage, bleeding, or redness.    Neurological:      Mental Status: She is alert.        Assessment and Plan    Problem List Items Addressed This Visit          Cardiac and Vasculature    Chest pain       Pulmonary and Pneumonias    Shortness of breath     Other Visit Diagnoses       Postpartum follow-up    -  Primary            S/p , 1 week(s) postpartum. Blood pressure check.   BP elevated (142/90). C/o chest pressure and SOA. Constant feeling of doom.   Physical assessment unremarkable.   Consulted w/ JTA (Oncall).     Plan:   Patient sent to ED for further evaluation. Transported via wheelchair by staff. "   Continued pelvic rest with a return to driving and light physical activity.  Baby doing well.  Signs/sx anxiety. On Zoloft. Denies depression, SI, HI. Will assess further and intervene after workup of CP & SOB.   Contraception: contraceptive methods: Abstinence  Return in about 1 week (around 9/1/2023) for Post partum, [place].    Kemi Matias, APRN  08/25/2023

## 2023-08-25 NOTE — H&P
[facility]  Obstetric History and Physical    Referring Provider: Adwoa Lawrence MD      Cc: Chest pain    Subjective     Patient is a 27 y.o. female  currently postop day #5 after a repeat  who presents from primary OB's office with complaint of chest pain.  Patient went a repeat  section 5 days ago without incident.  Patient is seen today for follow-up blood pressure.  Headache reported normal.  However patient complained of chest pressure/pain 6 out of 10 over the past several days that radiates to her back and up to her neck.  Patient denies fever, cough, and recent trauma. Today's blood pressure in office normal.  Patient seen in labor and delivery yesterday with a headache that resolved after Toradol.     .    The following portions of the patients history were reviewed and updated as appropriate: current medications, allergies, past medical history, past surgical history, past family history, past social history, and problem list .       Prenatal Information:   Maternal Prenatal Labs  Blood Type No results found for: ABO   Rh Status No results found for: RH   Antibody Screen No results found for: ABSCRN   Gonnorhea No results found for: GCCX   Chlamydia No results found for: CLAMYDCU   RPR No results found for: RPR   Syphilis Antibody No results found for: SYPHILIS   Rubella No results found for: RUBELLAIGGIN   Hepatitis B Surface Antigen No results found for: HEPBSAG   HIV-1 Antibody No results found for: LABHIV1   Hepatitis C Antibody No results found for: HEPCAB   Rapid Urin Drug Screen No results found for: AMPMETHU, BARBITSCNUR, LABBENZSCN, LABMETHSCN, LABOPIASCN, THCURSCR, COCAINEUR, AMPHETSCREEN, PROPOXSCN, BUPRENORSCNU, METAMPSCNUR, OXYCODONESCN, TRICYCLICSCN   Group B Strep Culture No results found for: GBSANTIGEN           External Prenatal Results       Pregnancy Outside Results - Transcribed From Office Records - See Scanned Records For Details       Test Value  Date Time    ABO  A  08/18/23 1756    Rh  Negative  08/18/23 1756    Antibody Screen  Negative  08/15/23 1256       Negative  05/23/23 1056       Positive  01/10/23 1228       See Final Results  01/10/23 1228    Varicella IgG       Rubella  1.89 index 01/10/23 1228    Hgb  10.3 g/dL 08/23/23 1410       10.5 g/dL 08/19/23 0814       11.7 g/dL 08/15/23 1256       11.5 g/dL 05/23/23 1056       13.0 g/dL 01/10/23 1228       13.1 g/dL 12/21/22 1009       13.1 g/dL 12/21/22 1009    Hct  31.6 % 08/23/23 1410       32.7 % 08/19/23 0814       35.7 % 08/15/23 1256       36.1 % 05/23/23 1056       40.3 % 01/10/23 1228       41.1 % 12/21/22 1009       41.1 % 12/21/22 1009    Glucose Fasting GTT ^ 72 mg/dL 11/20/18     Glucose Tolerance Test 1 hour ^ 135  11/13/18     Glucose Tolerance Test 3 hour ^ 96  11/20/18     Gonorrhea (discrete)  Negative  01/10/23 1228    Chlamydia (discrete)  Negative  01/10/23 1228    RPR  Non Reactive  01/10/23 1228    VDRL       Syphilis Antibody       HBsAg  Negative  01/10/23 1228    Herpes Simplex Virus PCR       Herpes Simplex VIrus Culture       HIV  Non Reactive  01/10/23 1228    Hep C RNA Quant PCR       Hep C Antibody  <0.1 s/co ratio 01/10/23 1228    AFP  38.9 ng/mL 03/07/23 1212    Group B Strep  No Group B Streptococcus isolated  07/26/23 1914    GBS Susceptibility to Clindamycin       GBS Susceptibility to Erythromycin       Fetal Fibronectin       Genetic Testing, Maternal Blood                 Drug Screening       Test Value Date Time    Urine Drug Screen       Amphetamine Screen  Negative  08/18/23 0950    Barbiturate Screen  Negative  08/18/23 0950    Benzodiazepine Screen  Negative  08/18/23 0950    Methadone Screen  Negative  08/18/23 0950    Phencyclidine Screen  Negative  08/18/23 0950    Opiates Screen  Negative  08/18/23 0950    THC Screen  Negative  08/18/23 0950    Cocaine Screen       Propoxyphene Screen  Negative  08/18/23 0950    Buprenorphine Screen  Negative  08/18/23  0950    Methamphetamine Screen       Oxycodone Screen  Negative  23 0950    Tricyclic Antidepressants Screen  Negative  23 0950              Legend    ^: Historical                              Past OB History:       OB History    Para Term  AB Living   4 3 3 0 1 3   SAB IAB Ectopic Molar Multiple Live Births   0 0 1 0 0 3      # Outcome Date GA Lbr Carl/2nd Weight Sex Delivery Anes PTL Lv   4 Term 23 39w3d  3795 g (8 lb 5.9 oz) M CS-LTranv Spinal N CARLY      Name: DALJIT ISAACJULES      Apgar1: 8  Apgar5: 9   3 Term 22 39w0d  3799 g (8 lb 6 oz) F CS-LTranv   CARLY   2 Ectopic 20           1 Term 19 39w2d 11:05 :50 3655 g (8 lb 0.9 oz) F Vag-Spont EPI N CARLY      Complications: Shoulder Dystocia      Name: NNEKA CASTILLO      Apgar1: 5  Apgar5: 9       Past Medical History: Past Medical History:   Diagnosis Date    Anemia     Anxiety and depression     Ectopic pregnancy     History of COVID-19 2022    Morbid obesity with BMI of 40.0-44.9, adult      (spontaneous vaginal delivery)       Past Surgical History Past Surgical History:   Procedure Laterality Date     SECTION N/A 2022    Procedure:  SECTION PRIMARY history of shoulder dystocia;  Surgeon: Adwoa Lawrence MD;  Location: [facility] LABOR DELIVERY;  Service: Obstetrics/Gynecology;  Laterality: N/A;     SECTION N/A 2023    Procedure:  SECTION REPEAT;  Surgeon: Adwoa Lawrence MD;  Location: [facility] LABOR DELIVERY;  Service: Obstetrics/Gynecology;  Laterality: N/A;    EAR TUBES      KNEE ARTHROSCOPY Right 2020    MOUTH SURGERY      frenulum clipped as a child    TONSILLECTOMY      WISDOM TOOTH EXTRACTION        Family History: Family History   Problem Relation Age of Onset    No Known Problems Father     Thyroid disease Mother     No Known Problems Brother     No Known Problems Sister     No Known Problems Son     No Known Problems Daughter     No  Known Problems Paternal Grandfather     No Known Problems Maternal Grandmother     No Known Problems Maternal Grandfather     No Known Problems Maternal Aunt     No Known Problems Maternal Uncle     No Known Problems Paternal Aunt     No Known Problems Paternal Uncle     Hypertension Other     Ovarian cancer Other       Social History:  reports that she has never smoked. She has never used smokeless tobacco.   reports no history of alcohol use.   reports no history of drug use.            ROS     All other systems have been reviewed and are neg  Objective       Vital Signs Range for the last 24 hours  Temperature: Temp:  [98.9 øF (37.2 øC)] 98.9 øF (37.2 øC)   Temp Source: Temp src: Oral   BP: BP: (141-143)/(74-90) 142/74   Pulse: Heart Rate:  [59-68] 63   Respirations: Resp:  [18] 18   SPO2: SpO2:  [95 %-97 %] 97 %   O2 Amount (l/min):     O2 Devices Device (Oxygen Therapy): room air   Weight: Weight:  [147 kg (325 lb)] 147 kg (325 lb)     Physical Examination:   General:   alert, appears stated age, and cooperative   Skin:   normal   HEENT:     Lungs:   clear to auscultation bilaterally   Heart:   regular rate and rhythm, S1, S2 normal, no murmur, click, rub or gallop   Gastrointestinal:     Lower Extremities: 1-2+ edema bilateral lower extremity   :    Musculoskeletal:     Neuro: No focal deficits noted DTR 2+4 no clonus         Laboratory Results:   Lab Results (last 24 hours)       Procedure Component Value Units Date/Time    High Sensitivity Troponin T [[id number]] Collected: 23    Specimen: Blood Updated: 23          Radiology Review:   Imaging Results (Last 24 Hours)       ** No results found for the last 24 hours. **          Other Studies:    Assessment & Plan       Chest pain        Assessment:  1. POD# 5 after a repeat  section.  Patient seen in the office today for follow-up blood pressure check.  Her blood pressure is normal however patient complained of chest  pressure/pain.  Patient appears to be nontoxic however with her risk factors we will proceed with a CT angiogram  2.  Obesity with BMI 46.63  3.  History of anxiety/depression   4.      Plan:  1.  Labs, CT angiogram, p.o. Lasix and reevaluate.  2. Plan of care has been reviewed with patient.  3.  Risks, benefits of treatment plan have been discussed.  4.  All questions have been answered.  5      Silvano Rodriguez,   8/25/2023  16:26 EDT

## 2023-08-25 NOTE — PROGRESS NOTES
Laborist    Chart review patient seen and examined.  Patient denies any current chest discomfort.  Troponin negative  Preeclampsia panel stable  CT angiogram revealed no evidence of pulmonary embolism.  Only significant finding was a small nodule right upper lobe inflammation/infection.  Blood pressure 140 over 70s  Heart rate 60s  Pulse oximeter 98% on room air    IMP 1.  Postop day #5 after a repeat  section               Normal postoperative exam          2.  Chest discomfort with no evidence of acute cardiopulmonary disease          3.  Mild hypertension.   I believe antihypertensive medication is not warranted at this time.          4.  Lower extremity edema nonpathologic, p.o. Lasix given          5.  Incidental finding on CT angiogram (small right upper lobe nodule inflammation/infection)              -Recommend to follow-up with PCP interval evaluation.  PLAN          1.  Discharged home, continue routine postoperative care.  Follow-up with Dr. Lawrence next week for postpartum visit.  Return for reevaluation if symptoms worsen or any other concerns.  All questions answered.  Patient agreed with plan.

## 2023-08-28 LAB
QT INTERVAL: 408 MS
QTC INTERVAL: 403 MS

## 2023-08-29 ENCOUNTER — POSTPARTUM VISIT (OUTPATIENT)
Dept: OBSTETRICS AND GYNECOLOGY | Facility: CLINIC | Age: 28
End: 2023-08-29
Payer: COMMERCIAL

## 2023-08-29 VITALS
HEIGHT: 70 IN | WEIGHT: 293 LBS | BODY MASS INDEX: 41.95 KG/M2 | SYSTOLIC BLOOD PRESSURE: 118 MMHG | DIASTOLIC BLOOD PRESSURE: 70 MMHG

## 2023-08-29 NOTE — PROGRESS NOTES
"      Chief Complaint   Patient presents with    Postpartum Care       Postpartum Visit         Sabra Smart is a 28 y.o.  who presents today for a 2 week(s) postpartum check.        , Low Transverse    Information for the patient's :  Babar Smart [[id number]]   2023   male   Babar Smart   3795 g (8 lb 5.9 oz)   Gestational Age: 39w3d    Baby Discharged with Mom  Delivering MD: Adwoa Lawrence MD.    Pregnancy complications:  postpartum hypertension.    Patient here today for blood pressure check and 10 day postpartum follow up.   Patient seen in ED and L&D on 23.  She was having chest and upper back discomfort.  All testing was wnl.  She has since improved her posture while breastfeeding and feels much better.     Patient reports her incision is clean, dry, intact. Patient describes vaginal bleeding as light.  She is breast feeding. Patient denies bowel or bladder issues.      Patient reports postpartum depression and anxiety. Postpartum Depression Screening Questionnaire: Was completed and she scored a 10.  She stopped Zoloft for a few months while pregnant and then restarted it postpartum.  She denies SI/HI and feels the medication is helping.      The additional following portions of the patient's history were reviewed and updated as appropriate: allergies, current medications, past family history, past medical history, past social history, past surgical history, and problem list.      Review of Systems    I have reviewed and agree with the HPI, ROS, and historical information as entered above. Nae Wilkerson, APRN      Objective   /70   Ht 177.8 cm (70\")   Wt (!) 140 kg (309 lb 9.6 oz)   LMP 2022 (Approximate)   Breastfeeding Yes   BMI 44.42 kg/mý     Physical Exam  Vitals and nursing note reviewed.   Constitutional:       General: She is not in acute distress.     Appearance: Normal appearance. She is not ill-appearing.   Pulmonary:      " Effort: Pulmonary effort is normal. No respiratory distress.   Abdominal:      General: There is no distension.      Palpations: Abdomen is soft. There is no mass.      Tenderness: There is no guarding or rebound.      Hernia: No hernia is present.      Comments: Incision well approximated without redness, drainage.   Skin:     General: Skin is warm and dry.   Neurological:      Mental Status: She is alert and oriented to person, place, and time.   Psychiatric:         Mood and Affect: Mood normal.         Behavior: Behavior normal.            Assessment and Plan    Problem List Items Addressed This Visit    None  Visit Diagnoses       Postpartum follow-up    -  Primary            S/p , 10 day(s) postpartum.  Doing well.    Continued pelvic rest with a return to driving and light physical activity.  Baby doing well.  Breastfeeding going well.  Contraception: contraceptive methods: Vasectomy --  has consult scheduled  Return in about 4 weeks (around 2023) for pp.    Nae Wilkerson, APRN  2023

## 2023-08-31 PROBLEM — R06.02 SHORTNESS OF BREATH: Status: ACTIVE | Noted: 2023-08-31

## 2023-09-05 DIAGNOSIS — F41.9 ANXIETY: ICD-10-CM

## 2023-09-26 ENCOUNTER — POSTPARTUM VISIT (OUTPATIENT)
Dept: OBSTETRICS AND GYNECOLOGY | Facility: CLINIC | Age: 28
End: 2023-09-26
Payer: COMMERCIAL

## 2023-09-26 VITALS
HEIGHT: 70 IN | BODY MASS INDEX: 41.95 KG/M2 | WEIGHT: 293 LBS | SYSTOLIC BLOOD PRESSURE: 122 MMHG | DIASTOLIC BLOOD PRESSURE: 68 MMHG

## 2023-09-26 DIAGNOSIS — F41.9 ANXIETY: ICD-10-CM

## 2023-09-26 DIAGNOSIS — E66.01 MORBID OBESITY WITH BMI OF 45.0-49.9, ADULT: ICD-10-CM

## 2023-09-26 PROBLEM — Z34.80 PRENATAL CARE, SUBSEQUENT PREGNANCY: Status: RESOLVED | Noted: 2023-01-10 | Resolved: 2023-09-26

## 2023-09-26 PROBLEM — O40.9XX0 POLYHYDRAMNIOS, ANTEPARTUM: Status: RESOLVED | Noted: 2023-05-23 | Resolved: 2023-09-26

## 2023-09-26 PROBLEM — O35.BXX0 ECHOGENIC FOCUS OF HEART OF FETUS AFFECTING ANTEPARTUM CARE OF MOTHER: Status: RESOLVED | Noted: 2023-05-02 | Resolved: 2023-09-26

## 2023-09-26 PROBLEM — Q27.0 TWO VESSEL CORD: Status: RESOLVED | Noted: 2023-05-02 | Resolved: 2023-09-26

## 2023-09-26 PROBLEM — R51.9 HEADACHE IN PREGNANCY, DELIVERED WITH POSTPARTUM COMPLICATION: Status: RESOLVED | Noted: 2023-08-24 | Resolved: 2023-09-26

## 2023-09-26 PROBLEM — R07.9 CHEST PAIN: Status: RESOLVED | Noted: 2023-08-25 | Resolved: 2023-09-26

## 2023-09-26 PROBLEM — Z67.91 RH NEGATIVE, ANTEPARTUM: Status: RESOLVED | Noted: 2021-06-29 | Resolved: 2023-09-26

## 2023-09-26 PROBLEM — O09.899 SHORT INTERVAL BETWEEN PREGNANCIES AFFECTING PREGNANCY, ANTEPARTUM: Status: RESOLVED | Noted: 2023-01-10 | Resolved: 2023-09-26

## 2023-09-26 PROBLEM — N76.0 ACUTE VAGINITIS: Status: RESOLVED | Noted: 2023-07-05 | Resolved: 2023-09-26

## 2023-09-26 PROBLEM — R06.02 SHORTNESS OF BREATH: Status: RESOLVED | Noted: 2023-08-31 | Resolved: 2023-09-26

## 2023-09-26 PROBLEM — O26.899 HEADACHE IN PREGNANCY, DELIVERED WITH POSTPARTUM COMPLICATION: Status: RESOLVED | Noted: 2023-08-24 | Resolved: 2023-09-26

## 2023-09-26 PROBLEM — O99.210 OBESITY IN PREGNANCY, ANTEPARTUM: Status: RESOLVED | Noted: 2023-01-10 | Resolved: 2023-09-26

## 2023-09-26 PROBLEM — O26.899 RH NEGATIVE, ANTEPARTUM: Status: RESOLVED | Noted: 2021-06-29 | Resolved: 2023-09-26

## 2023-09-26 NOTE — PROGRESS NOTES
"        Chief Complaint   Patient presents with    Postpartum Care       Postpartum Visit         Sabra Smart is a 28 y.o.  who presents today for a 6 week(s) postpartum check.     C/S: repeat     , Low Transverse    Information for the patient's :  Babar Smart [[id number]]   2023   male   Babar Smart   3795 g (8 lb 5.9 oz)   Gestational Age: 39w3d        Baby Discharged: Discharged with Mom  Delivering Physician: Adwoa Lawrence MD    Pt was last seen by DANIELLE Wilkerson on  following a visit to labor kaur and ER after SOB, elevated BP and chest pain. In labor kaur on  and had tests done:   \"Troponin negative  Preeclampsia panel stable  CT angiogram revealed no evidence of pulmonary embolism.  Only significant finding was a small nodule right upper lobe inflammation/infection.  Blood pressure 140 over 70s  Heart rate 60s  Pulse oximeter 98% on room air\"    Pt currently denies SOA, chest pain, headaches. Pt states she has not taken her BP at home since she has been feeling better.     At the time of delivery were you diagnosed with any of the following: None. The incision is healing well. Patient describes vaginal bleeding as none but reports leaking clear discharge. Patient is breast feeding.  She desires contraceptive methods: IUD  for contraception.  Patient denies bowel or bladder issues.      Patient denies postpartum depression. Pt currently zoloft for anxiety and reports she is doing well. Postpartum Depression Screening Questionnaire: 7, no treatment indicated.      Last Pap : 3/24/22. Results: negative. HPV: not done.   Last Completed Pap Smear            PAP SMEAR (Every 3 Years) Next due on 3/24/2025      2022  SCANNED - PAP SMEAR    2020  Done - neg                      The additional following portions of the patient's history were reviewed and updated as appropriate: allergies, current medications, past family history, past medical history, " "past social history, past surgical history, and problem list.    Review of Systems   Constitutional: Negative.    HENT: Negative.     Eyes: Negative.    Respiratory: Negative.     Cardiovascular: Negative.    Gastrointestinal: Negative.    Endocrine: Negative.    Genitourinary: Negative.    Musculoskeletal: Negative.    Skin:  Positive for wound.   Allergic/Immunologic: Negative.    Neurological: Negative.    Hematological: Negative.    Psychiatric/Behavioral:  The patient is nervous/anxious.    All other systems reviewed and are negative.     I have reviewed and agree with the HPI, ROS, and historical information as entered above. Adwoa Lawrence MD      /68   Ht 177.8 cm (70\")   Wt (!) 139 kg (307 lb)   LMP 2022 (Approximate)   Breastfeeding Yes   BMI 44.05 kg/m²     Physical Exam  Vitals and nursing note reviewed. Exam conducted with a chaperone present.   Constitutional:       Appearance: She is well-developed.   HENT:      Head: Normocephalic and atraumatic.   Pulmonary:      Effort: Pulmonary effort is normal.   Abdominal:      General: A surgical scar is present.      Palpations: Abdomen is soft. Abdomen is not rigid.      Comments: Clean, Dry, and Intact.  No erythema.    Genitourinary:     Vagina: No lesions or prolapsed vaginal walls.      Cervix: No lesion or erythema.      Uterus: Enlarged. Not tender and no uterine prolapse.       Adnexa:         Right: No mass, tenderness or fullness.          Left: No mass, tenderness or fullness.     Musculoskeletal:      Cervical back: Normal range of motion.   Neurological:      Mental Status: She is alert and oriented to person, place, and time.   Psychiatric:         Mood and Affect: Mood normal.         Behavior: Behavior normal.           Assessment and Plan    Problem List Items Addressed This Visit          Endocrine and Metabolic    Morbid obesity with BMI of 45.0-49.9, adult       Gravid and     Delivery of pregnancy by " " section - Primary    Overview     2022-out right  for history of shoulder dystocia. Baby girl \"Lakshmi\" weighing 8 pounds 6 ounces    2020 3 repeat  section              Mental Health    Anxiety    Overview     Patient to start Zoloft at 12 weeks.         Relevant Medications    sertraline (Zoloft) 50 MG tablet       S/p , 6 week(s) postpartum.  Doing well.    Return to normal physical activity.  No pelvic restrictions.   Discussed obesity.  Patient asking about Wegovy.  We discussed that she is breast-feeding and is not indicated at this time.  We discussed healthy lifestyle and exercise.  We will have patient come back in 4 weeks for Mirena.  Baby doing well.  No si/sx of postpartum depression  Contraception: contraceptive methods: IUD.    Return in about 4 weeks (around 10/24/2023) for IUD insertion.     Adwoa Lawrence MD  2023   "

## 2023-10-30 ENCOUNTER — OFFICE VISIT (OUTPATIENT)
Dept: OBSTETRICS AND GYNECOLOGY | Facility: CLINIC | Age: 28
End: 2023-10-30
Payer: COMMERCIAL

## 2023-10-30 VITALS
DIASTOLIC BLOOD PRESSURE: 72 MMHG | BODY MASS INDEX: 41.95 KG/M2 | SYSTOLIC BLOOD PRESSURE: 118 MMHG | HEIGHT: 70 IN | WEIGHT: 293 LBS

## 2023-10-30 DIAGNOSIS — Z97.5 IUD (INTRAUTERINE DEVICE) IN PLACE: ICD-10-CM

## 2023-10-30 DIAGNOSIS — Z30.430 ENCOUNTER FOR IUD INSERTION: Primary | ICD-10-CM

## 2023-10-30 DIAGNOSIS — E66.01 MORBID OBESITY WITH BMI OF 45.0-49.9, ADULT: ICD-10-CM

## 2023-10-30 LAB
B-HCG UR QL: NEGATIVE
EXPIRATION DATE: NORMAL
INTERNAL NEGATIVE CONTROL: NORMAL
INTERNAL POSITIVE CONTROL: NORMAL
Lab: NORMAL

## 2023-10-30 NOTE — PROGRESS NOTES
"     Gynecologic Procedure Note        Procedure: IUD Insertion     Insertion of IUD    Date/Time: 10/30/2023 4:30 PM    Performed by: Adwoa Lawrence MD  Authorized by: Adwoa Lawrence MD  Preparation: Patient was prepped and draped in the usual sterile fashion.  Local anesthesia used: no    Anesthesia:  Local anesthesia used: no    Sedation:  Patient sedated: no    Patient tolerance: patient tolerated the procedure well with no immediate complications          Pre procedure indication 1) Desires Mirena  Post procedure indication 1) Desires Mirena    NDC: Mirena 88252-494-38  Lot #: [id number]  Exp Date: 11/1/2025  BH device    /72   Ht 177.8 cm (70\")   Wt (!) 141 kg (311 lb 3.2 oz)   LMP  (LMP Unknown)   Breastfeeding Yes   BMI 44.65 kg/m²       The risks, benefits, and alternatives to Mirena were explained at length with the patient. All her questions were answered and consents were signed.  Her LMP was unknown .  Urine Pregnancy Test was Negative.  Patient does not have an allergy to betadine or shellfish. Pt denies unprotected intercourse in the past 2 weeks.     Time out: immediate members of the procedure team and patient agree to the following: correct patient, correct site, correct procedure to be performed. Adwoa Lawrence MD      The patient was placed in a dorsal lithotomy position on the examining table in [facility]. A bimanual exam confirmed the uterus was midposition. A speculum was inserted into the vagina and the cervix was brought into view.  The cervix was prepped with Betadine. The anterior lip of the cervix was then grasped with a single-tooth tenaculum. The endometrial cavity was then sounded to 9 centimeters. The sealed Mirena package was opened and the IUD was removed in a sterile fashion.    The upper edge of the depth setting the flange was set at the uterine sound measurement. The  was then carefully advanced to the cervical canal into the uterus to the " level of the fundus.  The slider was then retracted about 1 cm and deployed the device. The device was then gently advanced to the fundus. The IUD was then released by pulling the slider down all the way. The  was removed carefully from the uterus. The threads were then cut leaving 2-3 cm visible outside of the cervix.  The single-tooth tenaculum was removed from the anterior lip. Good hemostasis was noted.   All other instruments were removed from the vagina.       The patient tolerated the procedure well with a mild amount of discomfort.  She was monitored for 5 minutes prior to discharge.      There were no complications.    The patient was counseled about the need to return in 4 weeks for IUD check.     She was counseled about the need to use a backup method of contraception such as condoms until her post insertion exam was performed. The patient verbalized understanding when the IUD will need to be removed/replaced. Written information was given to the patient.  The patient is counseled to contact us if she has any significant or increasing bleeding, pain, fever, chills, or other concerns. She is instructed to see a doctor right away if she believes that she may be pregnant at any time with the IUD in place.    Patient requesting referral to weight loss clinic.  Referral made today.    Adwoa Lawrence MD

## 2023-12-12 ENCOUNTER — OFFICE VISIT (OUTPATIENT)
Dept: OBSTETRICS AND GYNECOLOGY | Facility: CLINIC | Age: 28
End: 2023-12-12
Payer: COMMERCIAL

## 2023-12-12 VITALS
HEIGHT: 70 IN | BODY MASS INDEX: 41.95 KG/M2 | WEIGHT: 293 LBS | SYSTOLIC BLOOD PRESSURE: 124 MMHG | DIASTOLIC BLOOD PRESSURE: 80 MMHG

## 2023-12-12 DIAGNOSIS — Z30.431 IUD CHECK UP: Primary | ICD-10-CM

## 2023-12-12 NOTE — PROGRESS NOTES
"    Chief Complaint   Patient presents with    Contraception     IUD Check          Subjective   HPI  Sabra Smart is a 28 y.o. female, , who presents for IUD check follow up.  She had a Mirena placed on 10/30/2023. Since the IUD placement, the patient  spotting . She has not had a period since the IUD was placed.    The additional following portions of the patient's history were reviewed and updated as appropriate: allergies and current medications.    Did the patient have u/s today? Yes.  Findings showed IUD had correct placement.  I have personally evaluated the U/S and agree with the findings. Kemi Matias, APRN    Review of Systems   Respiratory: Negative.  Negative for shortness of breath.    Cardiovascular: Negative.  Negative for chest pain.   Gastrointestinal: Negative.  Negative for abdominal distention, abdominal pain and constipation.   Genitourinary:  Positive for vaginal bleeding. Negative for breast discharge, breast lump, breast pain, dyspareunia, dysuria, menstrual problem, pelvic pain, pelvic pressure, vaginal discharge and vaginal pain.     All other systems reviewed and are negative.     I have reviewed and agree with the HPI, ROS, and historical information as entered above. Kemi Matias, APRN      Objective   /80 (BP Location: Right arm, Patient Position: Sitting, Cuff Size: Adult)   Ht 177.8 cm (70\")   Wt (!) 142 kg (313 lb)   Breastfeeding No   BMI 44.91 kg/m²     Physical Exam  Vitals and nursing note reviewed.   Constitutional:       General: She is not in acute distress.     Appearance: Normal appearance. She is not ill-appearing or toxic-appearing.   HENT:      Head: Normocephalic and atraumatic.   Pulmonary:      Effort: Pulmonary effort is normal.   Neurological:      Mental Status: She is alert and oriented to person, place, and time.   Psychiatric:         Mood and Affect: Mood normal.         Behavior: Behavior normal.         Assessment & Plan "     Assessment     Problem List Items Addressed This Visit       IUD (intrauterine device) in place - Primary    Overview     10/30/23- Mirena  12/12/23-IUD placement confirmed w/ US            US results reviewed with patient. IUD placed correctly.  Pap Neg 2022  Return in about 1 year (around 12/12/2024) for Annual physical or sooner if needed.      Kemi Matias, APRN  12/12/2023

## 2024-07-03 ENCOUNTER — APPOINTMENT (OUTPATIENT)
Facility: HOSPITAL | Age: 29
End: 2024-07-03
Payer: COMMERCIAL

## 2024-07-03 ENCOUNTER — HOSPITAL ENCOUNTER (EMERGENCY)
Facility: HOSPITAL | Age: 29
Discharge: HOME | End: 2024-07-03
Attending: EMERGENCY MEDICINE
Payer: COMMERCIAL

## 2024-07-03 VITALS
HEIGHT: 70 IN | SYSTOLIC BLOOD PRESSURE: 129 MMHG | HEART RATE: 82 BPM | RESPIRATION RATE: 20 BRPM | BODY MASS INDEX: 37.24 KG/M2 | OXYGEN SATURATION: 100 % | DIASTOLIC BLOOD PRESSURE: 94 MMHG | TEMPERATURE: 98.5 F | WEIGHT: 260.14 LBS

## 2024-07-03 DIAGNOSIS — R11.2 NAUSEA AND VOMITING, UNSPECIFIED VOMITING TYPE: Primary | ICD-10-CM

## 2024-07-03 DIAGNOSIS — R19.7 DIARRHEA, UNSPECIFIED TYPE: ICD-10-CM

## 2024-07-03 LAB
ALBUMIN SERPL-MCNC: 4.2 G/DL (ref 3.5–5.2)
ALBUMIN/GLOB SERPL: 1.4 G/DL
ALP SERPL-CCNC: 84 U/L (ref 39–117)
ALT SERPL W P-5'-P-CCNC: 49 U/L (ref 1–33)
AMORPH URATE CRY URNS QL MICRO: ABNORMAL /HPF
ANION GAP SERPL CALCULATED.3IONS-SCNC: 11.7 MMOL/L (ref 5–15)
AST SERPL-CCNC: 36 U/L (ref 1–32)
B-HCG UR QL: NEGATIVE
BACTERIA UR QL AUTO: ABNORMAL /HPF
BASOPHILS # BLD AUTO: 0.01 10*3/MM3 (ref 0–0.2)
BASOPHILS NFR BLD AUTO: 0.1 % (ref 0–1.5)
BILIRUB SERPL-MCNC: 0.5 MG/DL (ref 0–1.2)
BILIRUB UR QL STRIP: ABNORMAL
BUN SERPL-MCNC: 10 MG/DL (ref 6–20)
BUN/CREAT SERPL: 11.6 (ref 7–25)
CALCIUM SPEC-SCNC: 9.1 MG/DL (ref 8.6–10.5)
CHLORIDE SERPL-SCNC: 106 MMOL/L (ref 98–107)
CLARITY UR: ABNORMAL
CO2 SERPL-SCNC: 20.3 MMOL/L (ref 22–29)
COLOR UR: YELLOW
CREAT SERPL-MCNC: 0.86 MG/DL (ref 0.57–1)
DEPRECATED RDW RBC AUTO: 42.3 FL (ref 37–54)
EGFRCR SERPLBLD CKD-EPI 2021: 95.7 ML/MIN/1.73
EOSINOPHIL # BLD AUTO: 0.28 10*3/MM3 (ref 0–0.4)
EOSINOPHIL NFR BLD AUTO: 3.6 % (ref 0.3–6.2)
ERYTHROCYTE [DISTWIDTH] IN BLOOD BY AUTOMATED COUNT: 13.7 % (ref 12.3–15.4)
EXPIRATION DATE: NORMAL
GLOBULIN UR ELPH-MCNC: 3 GM/DL
GLUCOSE SERPL-MCNC: 90 MG/DL (ref 65–99)
GLUCOSE UR STRIP-MCNC: NEGATIVE MG/DL
HCT VFR BLD AUTO: 46 % (ref 34–46.6)
HGB BLD-MCNC: 15.1 G/DL (ref 12–15.9)
HGB UR QL STRIP.AUTO: NEGATIVE
HOLD SPECIMEN: NORMAL
HYALINE CASTS UR QL AUTO: ABNORMAL /LPF
IMM GRANULOCYTES # BLD AUTO: 0.01 10*3/MM3 (ref 0–0.05)
IMM GRANULOCYTES NFR BLD AUTO: 0.1 % (ref 0–0.5)
INTERNAL NEGATIVE CONTROL: NEGATIVE
INTERNAL POSITIVE CONTROL: POSITIVE
KETONES UR QL STRIP: ABNORMAL
LEUKOCYTE ESTERASE UR QL STRIP.AUTO: NEGATIVE
LIPASE SERPL-CCNC: 20 U/L (ref 13–60)
LYMPHOCYTES # BLD AUTO: 1.1 10*3/MM3 (ref 0.7–3.1)
LYMPHOCYTES NFR BLD AUTO: 14 % (ref 19.6–45.3)
Lab: NORMAL
MCH RBC QN AUTO: 27.3 PG (ref 26.6–33)
MCHC RBC AUTO-ENTMCNC: 32.8 G/DL (ref 31.5–35.7)
MCV RBC AUTO: 83 FL (ref 79–97)
MONOCYTES # BLD AUTO: 0.45 10*3/MM3 (ref 0.1–0.9)
MONOCYTES NFR BLD AUTO: 5.7 % (ref 5–12)
NEUTROPHILS NFR BLD AUTO: 6.02 10*3/MM3 (ref 1.7–7)
NEUTROPHILS NFR BLD AUTO: 76.5 % (ref 42.7–76)
NITRITE UR QL STRIP: NEGATIVE
PH UR STRIP.AUTO: 6 [PH] (ref 5–8)
PLATELET # BLD AUTO: 305 10*3/MM3 (ref 140–450)
PMV BLD AUTO: 9.1 FL (ref 6–12)
POTASSIUM SERPL-SCNC: 3.9 MMOL/L (ref 3.5–5.2)
PROT SERPL-MCNC: 7.2 G/DL (ref 6–8.5)
PROT UR QL STRIP: ABNORMAL
RBC # BLD AUTO: 5.54 10*6/MM3 (ref 3.77–5.28)
RBC # UR STRIP: ABNORMAL /HPF
REF LAB TEST METHOD: ABNORMAL
SODIUM SERPL-SCNC: 138 MMOL/L (ref 136–145)
SP GR UR STRIP: >=1.03 (ref 1–1.03)
SQUAMOUS #/AREA URNS HPF: ABNORMAL /HPF
UROBILINOGEN UR QL STRIP: ABNORMAL
WBC # UR STRIP: ABNORMAL /HPF
WBC NRBC COR # BLD AUTO: 7.87 10*3/MM3 (ref 3.4–10.8)
WHOLE BLOOD HOLD COAG: NORMAL
WHOLE BLOOD HOLD SPECIMEN: NORMAL

## 2024-07-03 PROCEDURE — 99285 EMERGENCY DEPT VISIT HI MDM: CPT

## 2024-07-03 PROCEDURE — 80053 COMPREHEN METABOLIC PANEL: CPT | Performed by: EMERGENCY MEDICINE

## 2024-07-03 PROCEDURE — 25510000001 IOPAMIDOL 61 % SOLUTION: Performed by: EMERGENCY MEDICINE

## 2024-07-03 PROCEDURE — 25010000002 MORPHINE PER 10 MG: Performed by: EMERGENCY MEDICINE

## 2024-07-03 PROCEDURE — 96374 THER/PROPH/DIAG INJ IV PUSH: CPT

## 2024-07-03 PROCEDURE — 25010000002 KETOROLAC TROMETHAMINE PER 15 MG: Performed by: EMERGENCY MEDICINE

## 2024-07-03 PROCEDURE — 83690 ASSAY OF LIPASE: CPT | Performed by: EMERGENCY MEDICINE

## 2024-07-03 PROCEDURE — 81001 URINALYSIS AUTO W/SCOPE: CPT | Performed by: EMERGENCY MEDICINE

## 2024-07-03 PROCEDURE — 81025 URINE PREGNANCY TEST: CPT | Performed by: EMERGENCY MEDICINE

## 2024-07-03 PROCEDURE — 96376 TX/PRO/DX INJ SAME DRUG ADON: CPT

## 2024-07-03 PROCEDURE — 85025 COMPLETE CBC W/AUTO DIFF WBC: CPT | Performed by: EMERGENCY MEDICINE

## 2024-07-03 PROCEDURE — 63710000001 ONDANSETRON ODT 4 MG TABLET DISPERSIBLE: Performed by: EMERGENCY MEDICINE

## 2024-07-03 PROCEDURE — 74177 CT ABD & PELVIS W/CONTRAST: CPT

## 2024-07-03 PROCEDURE — 96375 TX/PRO/DX INJ NEW DRUG ADDON: CPT

## 2024-07-03 RX ORDER — MORPHINE SULFATE 2 MG/ML
2 INJECTION, SOLUTION INTRAMUSCULAR; INTRAVENOUS ONCE
Status: COMPLETED | OUTPATIENT
Start: 2024-07-03 | End: 2024-07-03

## 2024-07-03 RX ORDER — ONDANSETRON 4 MG/1
4 TABLET, ORALLY DISINTEGRATING ORAL ONCE
Status: COMPLETED | OUTPATIENT
Start: 2024-07-03 | End: 2024-07-03

## 2024-07-03 RX ORDER — SODIUM CHLORIDE 9 MG/ML
10 INJECTION, SOLUTION INTRAMUSCULAR; INTRAVENOUS; SUBCUTANEOUS AS NEEDED
Status: DISCONTINUED | OUTPATIENT
Start: 2024-07-03 | End: 2024-07-03 | Stop reason: HOSPADM

## 2024-07-03 RX ORDER — KETOROLAC TROMETHAMINE 30 MG/ML
30 INJECTION, SOLUTION INTRAMUSCULAR; INTRAVENOUS ONCE
Status: COMPLETED | OUTPATIENT
Start: 2024-07-03 | End: 2024-07-03

## 2024-07-03 RX ORDER — PROMETHAZINE HYDROCHLORIDE 25 MG/1
25 TABLET ORAL EVERY 6 HOURS PRN
Qty: 12 TABLET | Refills: 0 | Status: SHIPPED | OUTPATIENT
Start: 2024-07-03

## 2024-07-03 RX ADMIN — MORPHINE SULFATE 2 MG: 2 INJECTION, SOLUTION INTRAMUSCULAR; INTRAVENOUS at 17:14

## 2024-07-03 RX ADMIN — MORPHINE SULFATE 4 MG: 4 INJECTION, SOLUTION INTRAMUSCULAR; INTRAVENOUS at 18:14

## 2024-07-03 RX ADMIN — KETOROLAC TROMETHAMINE 30 MG: 30 INJECTION, SOLUTION INTRAMUSCULAR; INTRAVENOUS at 18:14

## 2024-07-03 RX ADMIN — ONDANSETRON 4 MG: 4 TABLET, ORALLY DISINTEGRATING ORAL at 16:52

## 2024-07-03 RX ADMIN — IOPAMIDOL 91 ML: 612 INJECTION, SOLUTION INTRAVENOUS at 17:53

## 2024-07-03 NOTE — FSED PROVIDER NOTE
Subjective   History of Present Illness  Patient presents to the emerged department for nausea vomiting and diarrhea.  Symptoms began on .  Vomit and diarrhea has been nonbloody.  She denies any fevers no known sick contacts.  Says she has diffuse abdominal cramping.    History provided by:  Patient   used: No        Review of Systems   Gastrointestinal:  Positive for abdominal pain, diarrhea, nausea and vomiting.   All other systems reviewed and are negative.      Past Medical History:   Diagnosis Date    Anemia     Anxiety and depression     Ectopic pregnancy     History of COVID-19 2022    Morbid obesity with BMI of 40.0-44.9, adult     Ovarian cyst      (spontaneous vaginal delivery)        No Known Allergies    Past Surgical History:   Procedure Laterality Date     SECTION N/A 2022    Procedure:  SECTION PRIMARY history of shoulder dystocia;  Surgeon: Adwoa Lawrence MD;  Location:  [email or web address] LABOR DELIVERY;  Service: Obstetrics/Gynecology;  Laterality: N/A;     SECTION N/A 2023    Procedure:  SECTION REPEAT;  Surgeon: Adwoa Lawrence MD;  Location:  [email or web address] LABOR DELIVERY;  Service: Obstetrics/Gynecology;  Laterality: N/A;    EAR TUBES      KNEE ARTHROSCOPY Right 2020    MOUTH SURGERY      frenulum clipped as a child    TONSILLECTOMY      WISDOM TOOTH EXTRACTION         Family History   Problem Relation Age of Onset    No Known Problems Father     Thyroid disease Mother     No Known Problems Brother     No Known Problems Sister     No Known Problems Son     No Known Problems Daughter     No Known Problems Paternal Grandfather     Ovarian cancer Maternal Grandmother     No Known Problems Maternal Grandfather     No Known Problems Maternal Aunt     No Known Problems Maternal Uncle     No Known Problems Paternal Aunt     No Known Problems Paternal Uncle     Hypertension Other     Ovarian cancer Other     Breast cancer Neg  Hx     Uterine cancer Neg Hx     Colon cancer Neg Hx        Social History     Socioeconomic History    Marital status:      Spouse name: Wale Smart    Number of children: 1   Tobacco Use    Smoking status: Never    Smokeless tobacco: Never   Vaping Use    Vaping status: Never Used   Substance and Sexual Activity    Alcohol use: No    Drug use: No    Sexual activity: Yes     Partners: Male     Birth control/protection: None           Objective   Physical Exam  Vitals and nursing note reviewed.   Constitutional:       Appearance: Normal appearance. She is obese.   HENT:      Head: Normocephalic and atraumatic.      Nose: Nose normal.      Mouth/Throat:      Mouth: Mucous membranes are moist. Mucous membranes are dry.   Eyes:      Extraocular Movements: Extraocular movements intact.      Pupils: Pupils are equal, round, and reactive to light.   Cardiovascular:      Rate and Rhythm: Normal rate and regular rhythm.      Heart sounds: Normal heart sounds.   Pulmonary:      Effort: Pulmonary effort is normal. No respiratory distress.      Breath sounds: Normal breath sounds.   Abdominal:      General: There is no distension.      Palpations: Abdomen is soft. There is no mass.      Tenderness: There is no abdominal tenderness.   Musculoskeletal:         General: No swelling, tenderness or deformity. Normal range of motion.      Cervical back: Normal range of motion and neck supple.   Skin:     General: Skin is warm and dry.      Capillary Refill: Capillary refill takes less than 2 seconds.   Neurological:      Mental Status: She is alert and oriented to person, place, and time. Mental status is at baseline.      Cranial Nerves: No cranial nerve deficit.   Psychiatric:         Mood and Affect: Mood normal.         Behavior: Behavior normal.         Procedures           ED Course                                           Medical Decision Making  Hemodynamically stable and afebrile.  Patient has no peritoneal signs.   Does have signs of diarrheal illness on CT scan but laboratory evaluation is unremarkable.  Will give Phenergan for home.    Otherwise well-appearing given return precautions care instructions and discharge    Problems Addressed:  Diarrhea, unspecified type: complicated acute illness or injury  Nausea and vomiting, unspecified vomiting type: complicated acute illness or injury    Amount and/or Complexity of Data Reviewed  Labs: ordered. Decision-making details documented in ED Course.  Radiology: ordered. Decision-making details documented in ED Course.    Risk  Prescription drug management.        Final diagnoses:   Nausea and vomiting, unspecified vomiting type   Diarrhea, unspecified type       ED Disposition  ED Disposition       ED Disposition   Discharge    Condition   Stable    Comment   --               Kimberly Holt PA  [address]   [place] [address] [phone]    Schedule an appointment as soon as possible for a visit   As needed    [facility] EMERGENCY DEPARTMENT [place]  [address]  [facility] [address] [phone]  Go to   If symptoms worsen         Medication List        New Prescriptions      promethazine 25 MG tablet  Commonly known as: PHENERGAN  Take 1 tablet by mouth Every 6 (Six) Hours As Needed for Nausea or Vomiting.               Where to Get Your Medications        These medications were sent to [facility]/pharmacy #[id number] - [place], [place] - [facility] [facility] - [phone]  - [phone] [address] [address]      Phone: [phone]   promethazine 25 MG tablet

## 2024-07-07 ENCOUNTER — HOSPITAL ENCOUNTER (EMERGENCY)
Facility: HOSPITAL | Age: 29
Discharge: HOME | End: 2024-07-07
Attending: EMERGENCY MEDICINE | Admitting: EMERGENCY MEDICINE
Payer: COMMERCIAL

## 2024-07-07 VITALS
HEART RATE: 84 BPM | OXYGEN SATURATION: 100 % | TEMPERATURE: 98.3 F | SYSTOLIC BLOOD PRESSURE: 129 MMHG | RESPIRATION RATE: 16 BRPM | WEIGHT: 260 LBS | BODY MASS INDEX: 37.22 KG/M2 | DIASTOLIC BLOOD PRESSURE: 107 MMHG | HEIGHT: 70 IN

## 2024-07-07 DIAGNOSIS — R11.2 NAUSEA AND VOMITING, UNSPECIFIED VOMITING TYPE: ICD-10-CM

## 2024-07-07 DIAGNOSIS — R10.84 GENERALIZED ABDOMINAL PAIN: Primary | ICD-10-CM

## 2024-07-07 LAB
ALBUMIN SERPL-MCNC: 4.3 G/DL (ref 3.5–5.2)
ALBUMIN/GLOB SERPL: 1.5 G/DL
ALP SERPL-CCNC: 76 U/L (ref 39–117)
ALT SERPL W P-5'-P-CCNC: 41 U/L (ref 1–33)
ANION GAP SERPL CALCULATED.3IONS-SCNC: 11.1 MMOL/L (ref 5–15)
AST SERPL-CCNC: 30 U/L (ref 1–32)
B-HCG UR QL: NEGATIVE
BACTERIA UR QL AUTO: ABNORMAL /HPF
BASOPHILS # BLD AUTO: 0.02 10*3/MM3 (ref 0–0.2)
BASOPHILS NFR BLD AUTO: 0.3 % (ref 0–1.5)
BILIRUB SERPL-MCNC: 0.4 MG/DL (ref 0–1.2)
BILIRUB UR QL STRIP: ABNORMAL
BUN SERPL-MCNC: 10 MG/DL (ref 6–20)
BUN/CREAT SERPL: 11.9 (ref 7–25)
CALCIUM SPEC-SCNC: 9.2 MG/DL (ref 8.6–10.5)
CHLORIDE SERPL-SCNC: 105 MMOL/L (ref 98–107)
CLARITY UR: ABNORMAL
CO2 SERPL-SCNC: 23.9 MMOL/L (ref 22–29)
COLOR UR: YELLOW
CREAT SERPL-MCNC: 0.84 MG/DL (ref 0.57–1)
DEPRECATED RDW RBC AUTO: 42.5 FL (ref 37–54)
EGFRCR SERPLBLD CKD-EPI 2021: 98.4 ML/MIN/1.73
EOSINOPHIL # BLD AUTO: 0.45 10*3/MM3 (ref 0–0.4)
EOSINOPHIL NFR BLD AUTO: 6.5 % (ref 0.3–6.2)
ERYTHROCYTE [DISTWIDTH] IN BLOOD BY AUTOMATED COUNT: 13.9 % (ref 12.3–15.4)
GLOBULIN UR ELPH-MCNC: 2.9 GM/DL
GLUCOSE SERPL-MCNC: 87 MG/DL (ref 65–99)
GLUCOSE UR STRIP-MCNC: NEGATIVE MG/DL
HCG INTACT+B SERPL-ACNC: <0.2 MIU/ML
HCT VFR BLD AUTO: 44.9 % (ref 34–46.6)
HGB BLD-MCNC: 14.9 G/DL (ref 12–15.9)
HGB UR QL STRIP.AUTO: NEGATIVE
HYALINE CASTS UR QL AUTO: ABNORMAL /LPF
IMM GRANULOCYTES # BLD AUTO: 0.01 10*3/MM3 (ref 0–0.05)
IMM GRANULOCYTES NFR BLD AUTO: 0.1 % (ref 0–0.5)
KETONES UR QL STRIP: ABNORMAL
LEUKOCYTE ESTERASE UR QL STRIP.AUTO: ABNORMAL
LIPASE SERPL-CCNC: 20 U/L (ref 13–60)
LYMPHOCYTES # BLD AUTO: 1.15 10*3/MM3 (ref 0.7–3.1)
LYMPHOCYTES NFR BLD AUTO: 16.6 % (ref 19.6–45.3)
MCH RBC QN AUTO: 27.4 PG (ref 26.6–33)
MCHC RBC AUTO-ENTMCNC: 33.2 G/DL (ref 31.5–35.7)
MCV RBC AUTO: 82.5 FL (ref 79–97)
MONOCYTES # BLD AUTO: 0.36 10*3/MM3 (ref 0.1–0.9)
MONOCYTES NFR BLD AUTO: 5.2 % (ref 5–12)
MUCOUS THREADS URNS QL MICRO: ABNORMAL /HPF
NEUTROPHILS NFR BLD AUTO: 4.95 10*3/MM3 (ref 1.7–7)
NEUTROPHILS NFR BLD AUTO: 71.3 % (ref 42.7–76)
NITRITE UR QL STRIP: NEGATIVE
PH UR STRIP.AUTO: 6 [PH] (ref 5–8)
PLATELET # BLD AUTO: 297 10*3/MM3 (ref 140–450)
PMV BLD AUTO: 9.5 FL (ref 6–12)
POTASSIUM SERPL-SCNC: 3.9 MMOL/L (ref 3.5–5.2)
PROT SERPL-MCNC: 7.2 G/DL (ref 6–8.5)
PROT UR QL STRIP: NEGATIVE
RBC # BLD AUTO: 5.44 10*6/MM3 (ref 3.77–5.28)
RBC # UR STRIP: ABNORMAL /HPF
REF LAB TEST METHOD: ABNORMAL
SODIUM SERPL-SCNC: 140 MMOL/L (ref 136–145)
SP GR UR STRIP: 1.02 (ref 1–1.03)
SQUAMOUS #/AREA URNS HPF: ABNORMAL /HPF
UROBILINOGEN UR QL STRIP: ABNORMAL
WBC # UR STRIP: ABNORMAL /HPF
WBC NRBC COR # BLD AUTO: 6.94 10*3/MM3 (ref 3.4–10.8)

## 2024-07-07 PROCEDURE — 25010000002 DROPERIDOL PER 5 MG: Performed by: PHYSICIAN ASSISTANT

## 2024-07-07 PROCEDURE — 25010000002 ONDANSETRON PER 1 MG: Performed by: EMERGENCY MEDICINE

## 2024-07-07 PROCEDURE — 99283 EMERGENCY DEPT VISIT LOW MDM: CPT

## 2024-07-07 PROCEDURE — 85025 COMPLETE CBC W/AUTO DIFF WBC: CPT | Performed by: PHYSICIAN ASSISTANT

## 2024-07-07 PROCEDURE — 25010000002 MORPHINE PER 10 MG: Performed by: EMERGENCY MEDICINE

## 2024-07-07 PROCEDURE — 96374 THER/PROPH/DIAG INJ IV PUSH: CPT

## 2024-07-07 PROCEDURE — 84702 CHORIONIC GONADOTROPIN TEST: CPT | Performed by: EMERGENCY MEDICINE

## 2024-07-07 PROCEDURE — 80053 COMPREHEN METABOLIC PANEL: CPT | Performed by: PHYSICIAN ASSISTANT

## 2024-07-07 PROCEDURE — 96375 TX/PRO/DX INJ NEW DRUG ADDON: CPT

## 2024-07-07 PROCEDURE — 25010000002 METOCLOPRAMIDE PER 10 MG: Performed by: EMERGENCY MEDICINE

## 2024-07-07 PROCEDURE — 83690 ASSAY OF LIPASE: CPT | Performed by: PHYSICIAN ASSISTANT

## 2024-07-07 PROCEDURE — 25810000003 SODIUM CHLORIDE 0.9 % SOLUTION: Performed by: EMERGENCY MEDICINE

## 2024-07-07 PROCEDURE — 81001 URINALYSIS AUTO W/SCOPE: CPT | Performed by: PHYSICIAN ASSISTANT

## 2024-07-07 PROCEDURE — 81025 URINE PREGNANCY TEST: CPT | Performed by: PHYSICIAN ASSISTANT

## 2024-07-07 PROCEDURE — 25010000002 KETOROLAC TROMETHAMINE PER 15 MG: Performed by: PHYSICIAN ASSISTANT

## 2024-07-07 PROCEDURE — 25010000002 DIPHENHYDRAMINE PER 50 MG: Performed by: PHYSICIAN ASSISTANT

## 2024-07-07 RX ORDER — DROPERIDOL 2.5 MG/ML
2.5 INJECTION, SOLUTION INTRAMUSCULAR; INTRAVENOUS ONCE
Status: COMPLETED | OUTPATIENT
Start: 2024-07-07 | End: 2024-07-07

## 2024-07-07 RX ORDER — KETOROLAC TROMETHAMINE 15 MG/ML
15 INJECTION, SOLUTION INTRAMUSCULAR; INTRAVENOUS ONCE
Status: COMPLETED | OUTPATIENT
Start: 2024-07-07 | End: 2024-07-07

## 2024-07-07 RX ORDER — DICYCLOMINE HYDROCHLORIDE 10 MG/1
20 CAPSULE ORAL ONCE
Status: COMPLETED | OUTPATIENT
Start: 2024-07-07 | End: 2024-07-07

## 2024-07-07 RX ORDER — DIPHENHYDRAMINE HYDROCHLORIDE 50 MG/ML
25 INJECTION INTRAMUSCULAR; INTRAVENOUS ONCE
Status: COMPLETED | OUTPATIENT
Start: 2024-07-07 | End: 2024-07-07

## 2024-07-07 RX ORDER — DICYCLOMINE HYDROCHLORIDE 10 MG/1
10 CAPSULE ORAL 3 TIMES DAILY PRN
Qty: 15 CAPSULE | Refills: 0 | Status: SHIPPED | OUTPATIENT
Start: 2024-07-07

## 2024-07-07 RX ORDER — METOCLOPRAMIDE HYDROCHLORIDE 5 MG/ML
10 INJECTION INTRAMUSCULAR; INTRAVENOUS ONCE
Status: COMPLETED | OUTPATIENT
Start: 2024-07-07 | End: 2024-07-07

## 2024-07-07 RX ORDER — ONDANSETRON 2 MG/ML
4 INJECTION INTRAMUSCULAR; INTRAVENOUS ONCE
Status: COMPLETED | OUTPATIENT
Start: 2024-07-07 | End: 2024-07-07

## 2024-07-07 RX ORDER — SODIUM CHLORIDE 0.9 % (FLUSH) 0.9 %
10 SYRINGE (ML) INJECTION AS NEEDED
Status: DISCONTINUED | OUTPATIENT
Start: 2024-07-07 | End: 2024-07-07 | Stop reason: HOSPADM

## 2024-07-07 RX ADMIN — METOCLOPRAMIDE HYDROCHLORIDE 10 MG: 5 INJECTION INTRAMUSCULAR; INTRAVENOUS at 13:07

## 2024-07-07 RX ADMIN — DROPERIDOL 2.5 MG: 2.5 INJECTION, SOLUTION INTRAMUSCULAR; INTRAVENOUS at 14:45

## 2024-07-07 RX ADMIN — DICYCLOMINE HYDROCHLORIDE 20 MG: 10 CAPSULE ORAL at 13:07

## 2024-07-07 RX ADMIN — MORPHINE SULFATE 4 MG: 4 INJECTION, SOLUTION INTRAMUSCULAR; INTRAVENOUS at 12:04

## 2024-07-07 RX ADMIN — KETOROLAC TROMETHAMINE 15 MG: 15 INJECTION, SOLUTION INTRAMUSCULAR; INTRAVENOUS at 13:05

## 2024-07-07 RX ADMIN — DIPHENHYDRAMINE HYDROCHLORIDE 25 MG: 50 INJECTION, SOLUTION INTRAMUSCULAR; INTRAVENOUS at 13:30

## 2024-07-07 RX ADMIN — SODIUM CHLORIDE 1000 ML: 9 INJECTION, SOLUTION INTRAVENOUS at 13:06

## 2024-07-07 RX ADMIN — ONDANSETRON 4 MG: 2 INJECTION INTRAMUSCULAR; INTRAVENOUS at 12:03

## 2024-07-07 NOTE — DISCHARGE INSTRUCTIONS
Discontinue this semaglutide until you are seen by your primary care provider or prescribing provider.  You are being discharged home with 1 medication.  Take this medication as prescribed.  Return to the emergency department for any worsening symptoms

## 2024-07-07 NOTE — FSED PROVIDER NOTE
Subjective  History of Present Illness:    This is a 28-year-old female who presents with complaints of abdominal pain, nausea, vomiting and diarrhea.  Patient states that she was seen here 4 days ago for similar complaints.  She was feeling better at the time of discharge however symptoms returned yesterday.  Patient was recently restarted on semaglutide for weight loss.  She took a 1 month drug holiday and was restarted on the highest dose.  She has noticed that since she restarted she has had worsening symptoms.  Patient denies any fever, no melena, no hematemesis, no hematochezia.  Patient has taken Zofran at home without relief of symptoms      Nurses Notes reviewed and agree, including vitals, allergies, social history and prior medical history.     REVIEW OF SYSTEMS: All systems reviewed and not pertinent unless noted.  Review of Systems   Gastrointestinal:  Positive for abdominal pain, nausea and vomiting.   All other systems reviewed and are negative.      Past Medical History:   Diagnosis Date    Anemia     Anxiety and depression     Ectopic pregnancy     History of COVID-19 2022    Morbid obesity with BMI of 40.0-44.9, adult     Ovarian cyst      (spontaneous vaginal delivery)        Allergies:    Patient has no known allergies.      Past Surgical History:   Procedure Laterality Date     SECTION N/A 2022    Procedure:  SECTION PRIMARY history of shoulder dystocia;  Surgeon: Adwoa Lawrence MD;  Location: [facility] LABOR DELIVERY;  Service: Obstetrics/Gynecology;  Laterality: N/A;     SECTION N/A 2023    Procedure:  SECTION REPEAT;  Surgeon: Adwoa Lawrence MD;  Location: [facility] LABOR DELIVERY;  Service: Obstetrics/Gynecology;  Laterality: N/A;    EAR TUBES      KNEE ARTHROSCOPY Right 2020    MOUTH SURGERY      frenulum clipped as a child    TONSILLECTOMY      WISDOM TOOTH EXTRACTION           Social History     Socioeconomic History     "Marital status:      Spouse name: Wale Smart    Number of children: 1   Tobacco Use    Smoking status: Never    Smokeless tobacco: Never   Vaping Use    Vaping status: Never Used   Substance and Sexual Activity    Alcohol use: No    Drug use: No    Sexual activity: Yes     Partners: Male     Birth control/protection: None         Family History   Problem Relation Age of Onset    No Known Problems Father     Thyroid disease Mother     No Known Problems Brother     No Known Problems Sister     No Known Problems Son     No Known Problems Daughter     No Known Problems Paternal Grandfather     Ovarian cancer Maternal Grandmother     No Known Problems Maternal Grandfather     No Known Problems Maternal Aunt     No Known Problems Maternal Uncle     No Known Problems Paternal Aunt     No Known Problems Paternal Uncle     Hypertension Other     Ovarian cancer Other     Breast cancer Neg Hx     Uterine cancer Neg Hx     Colon cancer Neg Hx        Objective  Physical Exam:  BP (!) 129/107   Pulse 84   Temp 98.3 °F (36.8 °C)   Resp 16   Ht 177.8 cm (70\")   Wt 118 kg (260 lb)   LMP  (LMP Unknown)   SpO2 100%   BMI 37.31 kg/m²      Physical Exam  Vitals and nursing note reviewed.   Constitutional:       Appearance: She is well-developed.      Comments: In obvious pain   Cardiovascular:      Rate and Rhythm: Normal rate and regular rhythm.   Pulmonary:      Effort: Pulmonary effort is normal.      Breath sounds: Normal breath sounds.   Abdominal:      General: Abdomen is flat. Bowel sounds are normal.      Palpations: Abdomen is soft.      Tenderness: generalized    Skin:     General: Skin is warm and dry.   Neurological:      Mental Status: She is alert.         Procedures    ED Course:         Lab Results (last 24 hours)       Procedure Component Value Units Date/Time    CBC & Differential [[id number]]  (Abnormal) Collected: 07/07/24 1202    Specimen: Blood Updated: 07/07/24 1211    Narrative:      The following " orders were created for panel order CBC & Differential.  Procedure                               Abnormality         Status                     ---------                               -----------         ------                     CBC Auto Differential[[id number]]        Abnormal            Final result                 Please view results for these tests on the individual orders.    Comprehensive Metabolic Panel [[id number]]  (Abnormal) Collected: 07/07/24 1202    Specimen: Blood Updated: 07/07/24 1236     Glucose 87 mg/dL      BUN 10 mg/dL      Creatinine 0.84 mg/dL      Sodium 140 mmol/L      Potassium 3.9 mmol/L      Chloride 105 mmol/L      CO2 23.9 mmol/L      Calcium 9.2 mg/dL      Total Protein 7.2 g/dL      Albumin 4.3 g/dL      ALT (SGPT) 41 U/L      AST (SGOT) 30 U/L      Alkaline Phosphatase 76 U/L      Total Bilirubin 0.4 mg/dL      Globulin 2.9 gm/dL      A/G Ratio 1.5 g/dL      BUN/Creatinine Ratio 11.9     Anion Gap 11.1 mmol/L      eGFR 98.4 mL/min/1.73     Narrative:      GFR Normal >60  Chronic Kidney Disease <60  Kidney Failure <15      Lipase [[id number]]  (Normal) Collected: 07/07/24 1202    Specimen: Blood Updated: 07/07/24 1235     Lipase 20 U/L     CBC Auto Differential [[id number]]  (Abnormal) Collected: 07/07/24 1202    Specimen: Blood Updated: 07/07/24 1211     WBC 6.94 10*3/mm3      RBC 5.44 10*6/mm3      Hemoglobin 14.9 g/dL      Hematocrit 44.9 %      MCV 82.5 fL      MCH 27.4 pg      MCHC 33.2 g/dL      RDW 13.9 %      RDW-SD 42.5 fl      MPV 9.5 fL      Platelets 297 10*3/mm3      Neutrophil % 71.3 %      Lymphocyte % 16.6 %      Monocyte % 5.2 %      Eosinophil % 6.5 %      Basophil % 0.3 %      Immature Grans % 0.1 %      Neutrophils, Absolute 4.95 10*3/mm3      Lymphocytes, Absolute 1.15 10*3/mm3      Monocytes, Absolute 0.36 10*3/mm3      Eosinophils, Absolute 0.45 10*3/mm3      Basophils, Absolute 0.02 10*3/mm3      Immature Grans, Absolute 0.01 10*3/mm3     hCG, Quantitative,  Pregnancy [[id number]] Collected: 07/07/24 1210    Specimen: Blood Updated: 07/07/24 1239     HCG Quantitative <0.20 mIU/mL     Narrative:      HCG Ranges by Gestational Age    Females - non-pregnant premenopausal   </= 1mIU/mL HCG  Females - postmenopausal               </= 7mIU/mL HCG    3 Weeks         5.8 -    71.2 mIU/mL  4 Weeks         9.5 -     750 mIU/mL  5 Weeks         217 -   7,138 mIU/mL  6 Weeks         158 -  31,795 mIU/mL  7 Weeks       3,697 - 163,563 mIU/mL  8 Weeks      32,065 - 149,571 mIU/mL  9 Weeks      63,803 - 151,410 mIU/mL  10 Weeks     46,509 - 186,977 mIU/mL  12 Weeks     27,832 - 210,612 mIU/mL  14 Weeks     13,950 -  62,530 mIU/mL  15 Weeks     12,039 -  70,971 mIU/mL  16 Weeks      9,040 -  56,451 mIU/mL  17 Weeks      8,175 -  55,868 mIU/mL  18 Weeks      8,099 -  58,176 mIU/mL  Results may be falsely decreased if patient taking Biotin.      Pregnancy, Urine - Urine, Clean Catch [[id number]]  (Normal) Collected: 07/07/24 1233    Specimen: Urine, Clean Catch Updated: 07/07/24 1253     HCG, Urine QL Negative    Urinalysis With Microscopic If Indicated (No Culture) - Urine, Clean Catch [[id number]]  (Abnormal) Collected: 07/07/24 1233    Specimen: Urine, Clean Catch Updated: 07/07/24 1301     Color, UA Yellow     Appearance, UA Cloudy     pH, UA 6.0     Specific Gravity, UA 1.025     Glucose, UA Negative     Ketones, UA Trace     Bilirubin, UA Small (1+)     Blood, UA Negative     Protein, UA Negative     Leuk Esterase, UA Trace     Nitrite, UA Negative     Urobilinogen, UA 0.2 E.U./dL    Urinalysis, Microscopic Only - Urine, Clean Catch [[id number]]  (Abnormal) Collected: 07/07/24 1233    Specimen: Urine, Clean Catch Updated: 07/07/24 1302     RBC, UA None Seen /HPF      WBC, UA 3-5 /HPF      Bacteria, UA 3+ /HPF      Squamous Epithelial Cells, UA 13-20 /HPF      Hyaline Casts, UA None Seen /LPF      Mucus, UA Small/1+ /HPF      Methodology Manual Light Microscopy             No radiology  results from the last 24 hrs       MDM  Number of Diagnoses or Management Options  Generalized abdominal pain  Nausea and vomiting, unspecified vomiting type  Diagnosis management comments: Patient presenting with nausea vomiting reviewed vital signs all within normal limits patient given Reglan Benadryl with improvement in her symptoms.  Patient discharged with prescription of Bentyl       Amount and/or Complexity of Data Reviewed  Clinical lab tests: reviewed        Initial impression of presenting illness: Patient presents with complaints of nausea, vomiting and diarrhea.  Patient likely having adverse reaction to semaglutide.  She was given pain medication, Reglan, Benadryl with improvement of her symptoms.  Lab results revealed a normal white blood cell count, normal lipase. patient be discharged with Bentyl and instructions to discontinue the semaglutide until she is seen by the ordering provider for a dose reduction.  She will also be discharged with antiemetics.  Discussed return precautions.  Discussed findings and plan of care with the patient is agreeable to discharge.    DDX: includes but is not limited to: Nausea, vomiting, adverse drug reaction, small bowel obstruction, gastroenteritis, biliary colic      Medications   morphine injection 4 mg (4 mg Intravenous Given 7/7/24 1204)   ondansetron (ZOFRAN) injection 4 mg (4 mg Intravenous Given 7/7/24 1203)   ketorolac (TORADOL) injection 15 mg (15 mg Intravenous Given 7/7/24 1305)   metoclopramide (REGLAN) injection 10 mg (10 mg Intravenous Given 7/7/24 1307)   dicyclomine (BENTYL) capsule 20 mg (20 mg Oral Given 7/7/24 1307)   sodium chloride 0.9 % bolus 1,000 mL (0 mL Intravenous Stopped 7/7/24 1406)   diphenhydrAMINE (BENADRYL) injection 25 mg (25 mg Intravenous Given 7/7/24 1330)   droperidol (INAPSINE) injection 2.5 mg (2.5 mg Intravenous Given 7/7/24 1445)           Data interpreted: Nursing notes reviewed, vital signs reviewed.  Labs independently  interpreted by me (CBC, CMP, lipase, UA, troponin, ABG, lactic acid, procalcitonin).  Imaging independently interpreted by me (x-ray, CT scan).  EKG independently interpreted by me.  O2 saturation:    Counseling: Discussed the results above with the patient regarding need for admission or discharge.  Patient understands and agrees plan of care.      -----  ED Disposition       ED Disposition   Discharge    Condition   Stable    Comment   --             Final diagnoses:   Generalized abdominal pain   Nausea and vomiting, unspecified vomiting type      Your Follow-Up Providers       Schedule an appointment as soon as possible for a visit  with Kimberly Holt PA.    Specialty: Physician Assistant  Follow up details: For recheck  [address] [place] [address] [phone]                       Contact information for after-discharge care    Follow-up information has not been specified.                    Your medication list        START taking these medications        Instructions Last Dose Given Next Dose Due   dicyclomine 10 MG capsule  Commonly known as: BENTYL      Take 1 capsule by mouth 3 (Three) Times a Day As Needed for Abdominal Cramping.              CONTINUE taking these medications        Instructions Last Dose Given Next Dose Due   promethazine 25 MG tablet  Commonly known as: PHENERGAN      Take 1 tablet by mouth Every 6 (Six) Hours As Needed for Nausea or Vomiting.       Semaglutide (2 MG/DOSE) 8 MG/3ML solution pen-injector  Commonly known as: OZEMPIC      Inject  under the skin into the appropriate area as directed 1 (One) Time Per Week.       sertraline 50 MG tablet  Commonly known as: Zoloft      Take 1 tablet by mouth Daily.                 Where to Get Your Medications        These medications were sent to [facility]/pharmacy #[id number] - [place], [place] - [address] - [phone] [facility] [phone] [address] [address]      Phone: [phone]    dicyclomine 10 MG capsule

## (undated) DEVICE — APPL CHLORAPREP TINTED 26ML TEAL

## (undated) DEVICE — MAT PREVALON MOBL TRANSFR AIR WO/PAD 39X80IN

## (undated) DEVICE — TRAP FLD MINIVAC MEGADYNE 100ML

## (undated) DEVICE — 4-PORT MANIFOLD: Brand: NEPTUNE 2

## (undated) DEVICE — COATED VICRYL  (POLYGLACTIN 910) SUTURE, VIOLET BRAIDED, STERILE, SYNTHETIC ABSORBABLE SUTURE: Brand: COATED VICRYL

## (undated) DEVICE — GLV SURG BIOGEL LTX PF 6 1/2

## (undated) DEVICE — CLTH CLENS READYCLEANSE PERI CARE PK/5

## (undated) DEVICE — SUT PDS 1 TP1 48IN Z880G BX/12

## (undated) DEVICE — TBG PENCL TELESCP MEGADYNE SMOKE EVAC 10FT

## (undated) DEVICE — SUT GUT CHRM 2/0 CT1 27IN 811H

## (undated) DEVICE — PATIENT RETURN ELECTRODE, SINGLE-USE, CONTACT QUALITY MONITORING, ADULT, WITH 9FT CORD, FOR PATIENTS WEIGING OVER 33LBS. (15KG): Brand: MEGADYNE

## (undated) DEVICE — PK C/SECT 10

## (undated) DEVICE — SOL IRR H2O BTL 1000ML STRL

## (undated) DEVICE — ADHS SKIN PREMIERPRO EXOFIN TOPICAL HI/VISC .5ML

## (undated) DEVICE — SUT VIC 4/0 KS 27IN VCP662H

## (undated) DEVICE — SOL IRR NACL 0.9PCT BT 1000ML

## (undated) DEVICE — TRY SPINE BLCK WHITACRE 25G 3X5IN

## (undated) DEVICE — SUT GUT CHRM 1 CTX 36IN 905H